# Patient Record
Sex: FEMALE | Race: WHITE | NOT HISPANIC OR LATINO | Employment: OTHER | URBAN - METROPOLITAN AREA
[De-identification: names, ages, dates, MRNs, and addresses within clinical notes are randomized per-mention and may not be internally consistent; named-entity substitution may affect disease eponyms.]

---

## 2017-01-12 ENCOUNTER — OFFICE VISIT (OUTPATIENT)
Dept: GASTROENTEROLOGY | Facility: CLINIC | Age: 77
End: 2017-01-12

## 2017-01-12 VITALS
SYSTOLIC BLOOD PRESSURE: 122 MMHG | WEIGHT: 120.4 LBS | BODY MASS INDEX: 23.64 KG/M2 | DIASTOLIC BLOOD PRESSURE: 82 MMHG | HEIGHT: 60 IN

## 2017-01-12 DIAGNOSIS — R10.10 PAIN OF UPPER ABDOMEN: Primary | ICD-10-CM

## 2017-01-12 PROCEDURE — 99213 OFFICE O/P EST LOW 20 MIN: CPT | Performed by: INTERNAL MEDICINE

## 2017-01-12 RX ORDER — OMEPRAZOLE 40 MG/1
CAPSULE, DELAYED RELEASE ORAL
Refills: 11 | COMMUNITY
Start: 2017-01-03 | End: 2017-06-01

## 2017-01-12 RX ORDER — ESTRADIOL 0.1 MG/G
2 CREAM VAGINAL WEEKLY
Refills: 3 | COMMUNITY
Start: 2017-01-05

## 2017-01-12 NOTE — PROGRESS NOTES
Chief Complaint   Patient presents with   • Follow-up     from colonoscopy and egd       History of Present Illness: We discussed the results of the EGD and colonoscopy (and labs) done in 12/16. She doesn' t think that the Omerprazole 40 mg/day helps. She has occaisonal upper abdominal twisting discomfort that has gone on for years and it happens when she bends over and it is better if she doesn't bend over. No nausea or vomting. No fevers, chills. No diarrhea or constipation.  No rectal bleeding or melena. Weight stable.     Past Medical History   Diagnosis Date   • Female bladder prolapse    • Hyperlipemia    • Hypertension        Past Surgical History   Procedure Laterality Date   • No past surgeries     • Endoscopy N/A 12/2/2016     Procedure: ESOPHAGOGASTRODUODENOSCOPY with biopsy;  Surgeon: Anthony De MD;  Location: [facility] ENDOSCOPY;  Service:    • Colonoscopy N/A 12/2/2016     Procedure: COLONOSCOPY TO CECUM/TI;  Surgeon: Anthony De MD;  Location: [facility] ENDOSCOPY;  Service:          Current Outpatient Prescriptions:   •  amLODIPine-benazepril (LOTREL 5-10) 5-10 MG per capsule, Take 1 capsule by mouth daily., Disp: 90 capsule, Rfl: 1  •  calcium carbonate (OS-ANGELA) 600 MG tablet, Take 600 mg by mouth daily., Disp: , Rfl:   •  ESTRACE VAGINAL 0.1 MG/GM vaginal cream, U UTD QHS FOR 2 WEEKS THEN U THREE TIMES WEEKLY QHS, Disp: , Rfl: 3  •  Multiple Vitamin (MULTI VITAMIN DAILY PO), Take  by mouth., Disp: , Rfl:   •  Omega-3 Fatty Acids (FISH OIL) 1000 MG capsule capsule, Take  by mouth daily with breakfast., Disp: , Rfl:   •  omeprazole (priLOSEC) 40 MG capsule, TK 1 C PO QD, Disp: , Rfl: 11    No Known Allergies    Family History   Problem Relation Age of Onset   • Heart disease Mother    • Heart disease Father    • Heart disease Brother    • Cancer Brother      tongue       Social History     Social History   • Marital status:      Spouse name: N/A   • Number of children: N/A   • Years of education:  N/A     Occupational History   • Not on file.     Social History Main Topics   • Smoking status: Never Smoker   • Smokeless tobacco: Never Used   • Alcohol use Yes      Comment: rarely   • Drug use: No   • Sexual activity: Defer     Other Topics Concern   • Not on file     Social History Narrative       Review of Systems   Gastrointestinal: Positive for abdominal pain.   All other systems reviewed and are negative.      Vitals:    01/12/17 1013   BP: 122/82       Physical Exam   Constitutional: She is oriented to person, place, and time. She appears well-developed and well-nourished. No distress.   HENT:   Head: Normocephalic and atraumatic. Hair is normal.   Right Ear: Hearing, tympanic membrane, external ear and ear canal normal. No drainage. No decreased hearing is noted.   Left Ear: Hearing, tympanic membrane, external ear and ear canal normal. No decreased hearing is noted.   Nose: No nasal deformity.   Mouth/Throat: Oropharynx is clear and moist.   Eyes: Conjunctivae, EOM and lids are normal. Pupils are equal, round, and reactive to light. Right eye exhibits no discharge. Left eye exhibits no discharge.   Neck: Normal range of motion. Neck supple. No JVD present. No tracheal deviation present. No thyromegaly present.   Cardiovascular: Normal rate, regular rhythm, normal heart sounds, intact distal pulses and normal pulses.  Exam reveals no gallop and no friction rub.    No murmur heard.  Pulmonary/Chest: Effort normal and breath sounds normal. No respiratory distress. She has no wheezes. She has no rales. She exhibits no tenderness.   Abdominal: Soft. Bowel sounds are normal. She exhibits no distension and no mass. There is no tenderness. There is no rebound and no guarding. No hernia.   Musculoskeletal: Normal range of motion. She exhibits no edema, tenderness or deformity.   Lymphadenopathy:     She has no cervical adenopathy.   Neurological: She is alert and oriented to person, place, and time. She has  normal reflexes. She displays normal reflexes. No cranial nerve deficit. She exhibits normal muscle tone. Coordination normal.   Skin: Skin is warm and dry. No rash noted. She is not diaphoretic. No erythema.   Psychiatric: She has a normal mood and affect. Her behavior is normal. Judgment and thought content normal.   Vitals reviewed.      Madiha was seen today for follow-up.    Diagnoses and all orders for this visit:    Pain of upper abdomen     Assessment:  1) Upper abdominal disscomfort. She is not interested in any further testing.  2) Heme positive stool with an unrevealing workup.    Recommendations:  1) F/U prn.      No Follow-up on file.

## 2017-01-12 NOTE — MR AVS SNAPSHOT
Joy C Sprigler   2017 10:00 AM   Office Visit    Dept Phone:  [phone]   Encounter #:  [id number]    Provider:  Anthony De MD   Department:  [facility]                Your Full Care Plan              Your Updated Medication List          This list is accurate as of: 17 10:53 AM.  Always use your most recent med list.                amLODIPine-benazepril 5-10 MG per capsule   Commonly known as:  LOTREL 5-10   Take 1 capsule by mouth daily.       calcium carbonate 600 MG tablet   Commonly known as:  OS-ANGELA       ESTRACE VAGINAL 0.1 MG/GM vaginal cream   Generic drug:  estradiol       fish oil 1000 MG capsule capsule       MULTI VITAMIN DAILY PO       omeprazole 40 MG capsule   Commonly known as:  priLOSEC               You Were Diagnosed With        Codes Comments    Pain of upper abdomen    -  Primary ICD-10-CM: R10.10  ICD-9-CM: 789.09       Instructions     None    Patient Instructions History      Upcoming Appointments     Visit Type Date Time Department    FOLLOW UP 2017 10:00 AM [facility]      [employer] Signup     [facility] [employer] allows you to send messages to your doctor, view your test results, renew your prescriptions, schedule appointments, and more. To sign up, go to [employer] and click on the Sign Up Now link in the New User? box. Enter your [employer] Activation Code exactly as it appears below along with the last four digits of your Social Security Number and your Date of Birth () to complete the sign-up process. If you do not sign up before the expiration date, you must request a new code.    [employer] Activation Code: [email or web address]  Expires: 2017 10:53 AM    If you have questions, you can email [email or web address] or call [phone] to talk to our [employer] staff. Remember, [employer] is NOT to be used for urgent needs. For medical emergencies, dial 911.               Other Info  "from Your Visit           Allergies     No Known Allergies      Reason for Visit     Follow-up from colonoscopy and egd      Vital Signs     Blood Pressure Height Weight Body Mass Index Smoking Status       122/82 60\" (152.4 cm) 120 lb 6.4 oz (54.6 kg) 23.51 kg/m2 Never Smoker       Problems and Diagnoses Noted     Pain of upper abdomen    -  Primary        "

## 2017-02-17 RX ORDER — AMLODIPINE BESYLATE AND BENAZEPRIL HYDROCHLORIDE 5; 10 MG/1; MG/1
1 CAPSULE ORAL DAILY
Qty: 90 CAPSULE | Refills: 0 | Status: SHIPPED | OUTPATIENT
Start: 2017-02-17 | End: 2017-06-01 | Stop reason: SDUPTHER

## 2017-02-24 DIAGNOSIS — Z12.31 ENCOUNTER FOR SCREENING MAMMOGRAM FOR BREAST CANCER: Primary | ICD-10-CM

## 2017-03-07 DIAGNOSIS — Z12.31 ENCOUNTER FOR SCREENING MAMMOGRAM FOR BREAST CANCER: ICD-10-CM

## 2017-06-01 ENCOUNTER — OFFICE VISIT (OUTPATIENT)
Dept: FAMILY MEDICINE CLINIC | Facility: CLINIC | Age: 77
End: 2017-06-01

## 2017-06-01 VITALS
OXYGEN SATURATION: 97 % | TEMPERATURE: 98.1 F | SYSTOLIC BLOOD PRESSURE: 138 MMHG | HEART RATE: 73 BPM | RESPIRATION RATE: 14 BRPM | DIASTOLIC BLOOD PRESSURE: 80 MMHG | BODY MASS INDEX: 23.8 KG/M2 | HEIGHT: 60 IN | WEIGHT: 121.2 LBS

## 2017-06-01 DIAGNOSIS — E78.5 HYPERLIPIDEMIA, UNSPECIFIED HYPERLIPIDEMIA TYPE: ICD-10-CM

## 2017-06-01 DIAGNOSIS — I10 ESSENTIAL HYPERTENSION: Primary | ICD-10-CM

## 2017-06-01 PROCEDURE — 99213 OFFICE O/P EST LOW 20 MIN: CPT | Performed by: INTERNAL MEDICINE

## 2017-06-01 RX ORDER — AMLODIPINE BESYLATE AND BENAZEPRIL HYDROCHLORIDE 5; 10 MG/1; MG/1
1 CAPSULE ORAL DAILY
Qty: 90 CAPSULE | Refills: 0 | Status: SHIPPED | OUTPATIENT
Start: 2017-06-01 | End: 2017-09-13 | Stop reason: SDUPTHER

## 2017-06-01 NOTE — PROGRESS NOTES
Subjective chief complaint is follow-up for hypertension and medication refill  Joy C Sprigler is a 76 y.o. female.     History of Present Illness   Madiha is here today for follow-up.  She does have hypertension for which she takes Lotrel 5/10.  She has been on this for a number of years and it controls her blood pressure fairly well.  Recently she has had her colonoscopy done.  She also had an upper endoscopy which showed some mild gastritis.  She also saw the urologist who has fitted her with a pessary.  This seems to be controlling some of her urinary symptoms.  She is not having any vaginitis from this.  She does have a history of hyperlipidemia but is on no medications.  She seems to be well-controlled this with diet.  While in [place] she did have some problems with the ears.  She was told she had wax in her ears.  The following portions of the patient's history were reviewed and updated as appropriate: allergies, current medications, past medical history, past social history and problem list.    Review of Systems   Respiratory: Negative for chest tightness and shortness of breath.    Cardiovascular: Negative for chest pain and leg swelling.   Musculoskeletal:        She still gets some chest wall tenderness   Neurological: Negative for dizziness, light-headedness and headaches.       Objective   Physical Exam   Constitutional: She appears well-developed and well-nourished.   Neck: Carotid bruit is not present. No thyromegaly present.   Cardiovascular: Normal rate, regular rhythm, normal heart sounds and intact distal pulses.  Exam reveals no gallop and no friction rub.    No murmur heard.  Pulmonary/Chest: Effort normal and breath sounds normal. No respiratory distress. She has no wheezes. She has no rales.   Abdominal: Soft. Bowel sounds are normal. She exhibits no distension. There is no tenderness. There is no rebound and no guarding.   Musculoskeletal: She exhibits no edema.   Nursing note and vitals  reviewed.      Assessment/Plan   Madiha was seen today for med management.    Diagnoses and all orders for this visit:    Essential hypertension  -     Comprehensive Metabolic Panel  -     CBC & Differential    Hyperlipidemia, unspecified hyperlipidemia type  -     Lipid Panel    Other orders  -     amLODIPine-benazepril (LOTREL 5-10) 5-10 MG per capsule; Take 1 capsule by mouth Daily.      Madiha is here today for follow-up.  Her blood pressure seems to be doing fairly well we did renew her medication.  We are going to check on her cholesterol today.  She is up-to-date on her screening measures.  She does not want a pneumonia vaccine.

## 2017-06-02 LAB
ALBUMIN SERPL-MCNC: 4.6 G/DL (ref 3.5–5.2)
ALBUMIN/GLOB SERPL: 1.6 G/DL
ALP SERPL-CCNC: 65 U/L (ref 39–117)
ALT SERPL-CCNC: 12 U/L (ref 1–33)
AST SERPL-CCNC: 18 U/L (ref 1–32)
BASOPHILS # BLD AUTO: 0.03 10*3/MM3 (ref 0–0.2)
BASOPHILS NFR BLD AUTO: 0.5 % (ref 0–1.5)
BILIRUB SERPL-MCNC: 0.7 MG/DL (ref 0.1–1.2)
BUN SERPL-MCNC: 16 MG/DL (ref 8–23)
BUN/CREAT SERPL: 22.2 (ref 7–25)
CALCIUM SERPL-MCNC: 10 MG/DL (ref 8.6–10.5)
CHLORIDE SERPL-SCNC: 103 MMOL/L (ref 98–107)
CHOLEST SERPL-MCNC: 200 MG/DL (ref 0–200)
CO2 SERPL-SCNC: 28.4 MMOL/L (ref 22–29)
CREAT SERPL-MCNC: 0.72 MG/DL (ref 0.57–1)
EOSINOPHIL # BLD AUTO: 0.1 10*3/MM3 (ref 0–0.7)
EOSINOPHIL NFR BLD AUTO: 1.5 % (ref 0.3–6.2)
ERYTHROCYTE [DISTWIDTH] IN BLOOD BY AUTOMATED COUNT: 14.1 % (ref 11.7–13)
GLOBULIN SER CALC-MCNC: 2.9 GM/DL
GLUCOSE SERPL-MCNC: 96 MG/DL (ref 65–99)
HCT VFR BLD AUTO: 44.3 % (ref 35.6–45.5)
HDLC SERPL-MCNC: 54 MG/DL (ref 40–60)
HGB BLD-MCNC: 14.4 G/DL (ref 11.9–15.5)
IMM GRANULOCYTES # BLD: 0 10*3/MM3 (ref 0–0.03)
IMM GRANULOCYTES NFR BLD: 0 % (ref 0–0.5)
INTERPRETATION: NORMAL
LDLC SERPL CALC-MCNC: 122 MG/DL (ref 0–100)
LYMPHOCYTES # BLD AUTO: 2.02 10*3/MM3 (ref 0.9–4.8)
LYMPHOCYTES NFR BLD AUTO: 30.3 % (ref 19.6–45.3)
MCH RBC QN AUTO: 29.8 PG (ref 26.9–32)
MCHC RBC AUTO-ENTMCNC: 32.5 G/DL (ref 32.4–36.3)
MCV RBC AUTO: 91.7 FL (ref 80.5–98.2)
MONOCYTES # BLD AUTO: 0.54 10*3/MM3 (ref 0.2–1.2)
MONOCYTES NFR BLD AUTO: 8.1 % (ref 5–12)
NEUTROPHILS # BLD AUTO: 3.97 10*3/MM3 (ref 1.9–8.1)
NEUTROPHILS NFR BLD AUTO: 59.6 % (ref 42.7–76)
PLATELET # BLD AUTO: 322 10*3/MM3 (ref 140–500)
POTASSIUM SERPL-SCNC: 4.2 MMOL/L (ref 3.5–5.2)
PROT SERPL-MCNC: 7.5 G/DL (ref 6–8.5)
RBC # BLD AUTO: 4.83 10*6/MM3 (ref 3.9–5.2)
SODIUM SERPL-SCNC: 144 MMOL/L (ref 136–145)
TRIGL SERPL-MCNC: 118 MG/DL (ref 0–150)
VLDLC SERPL CALC-MCNC: 23.6 MG/DL (ref 5–40)
WBC # BLD AUTO: 6.66 10*3/MM3 (ref 4.5–10.7)

## 2017-09-13 RX ORDER — AMLODIPINE BESYLATE AND BENAZEPRIL HYDROCHLORIDE 5; 10 MG/1; MG/1
1 CAPSULE ORAL DAILY
Qty: 90 CAPSULE | Refills: 0 | Status: SHIPPED | OUTPATIENT
Start: 2017-09-13 | End: 2017-12-22 | Stop reason: SDUPTHER

## 2017-09-14 ENCOUNTER — OFFICE VISIT (OUTPATIENT)
Dept: FAMILY MEDICINE CLINIC | Facility: CLINIC | Age: 77
End: 2017-09-14

## 2017-09-14 VITALS
SYSTOLIC BLOOD PRESSURE: 140 MMHG | BODY MASS INDEX: 23.95 KG/M2 | HEART RATE: 77 BPM | TEMPERATURE: 97.8 F | WEIGHT: 122 LBS | RESPIRATION RATE: 15 BRPM | HEIGHT: 60 IN | DIASTOLIC BLOOD PRESSURE: 88 MMHG

## 2017-09-14 DIAGNOSIS — M54.16 LUMBAR RADICULITIS: ICD-10-CM

## 2017-09-14 DIAGNOSIS — B02.30 HERPES ZOSTER WITH OPHTHALMIC COMPLICATION, UNSPECIFIED HERPES ZOSTER EYE DISEASE: Primary | ICD-10-CM

## 2017-09-14 DIAGNOSIS — R11.0 NAUSEA: ICD-10-CM

## 2017-09-14 PROCEDURE — 99213 OFFICE O/P EST LOW 20 MIN: CPT | Performed by: INTERNAL MEDICINE

## 2017-09-14 RX ORDER — PROMETHAZINE HYDROCHLORIDE 12.5 MG/1
12.5 TABLET ORAL EVERY 6 HOURS PRN
Qty: 12 TABLET | Refills: 0 | Status: SHIPPED | OUTPATIENT
Start: 2017-09-14 | End: 2019-07-12

## 2017-09-14 RX ORDER — ERYTHROMYCIN 5 MG/G
OINTMENT OPHTHALMIC
Refills: 0 | COMMUNITY
Start: 2017-09-11 | End: 2019-07-12

## 2017-09-14 RX ORDER — ACYCLOVIR 800 MG/1
TABLET ORAL
Refills: 0 | COMMUNITY
Start: 2017-09-11 | End: 2019-07-12

## 2017-09-14 NOTE — PROGRESS NOTES
Subjective chief complaint is follow-up for herpes  Joy C Sprigler is a 76 y.o. female.     History of Present Illness   Madiha is here today for follow-up.  She developed herpes in the frontal region approximately 5-7 days before seeking treatment at [facility] because it was itching but not painful..  She has been treated with acyclovir 800 mg 5 times daily.  After a few days of medication she has been feeling shaky and nauseated.  Additionally she had severe pain in her right buttock and leg this morning.  There is been no injury.  She does not have a rash in this area.    Past medical history is remarkable for hypertension.  The following portions of the patient's history were reviewed and updated as appropriate: allergies, current medications, past medical history and problem list.    Review of Systems   Constitutional: Negative for chills and fever.   Eyes: Negative for pain and visual disturbance.       Objective   Physical Exam   Musculoskeletal:   There is no pain with range of motion of the right hip.  There is no tenderness to the trochanter or piriformis muscle.  There is no sacroiliac tenderness.   Skin:   There is a scabbed over herpetic lesions on the left upper eyelid and left frontal area.       Assessment/Plan   Madhia was seen today for herpes zoster.    Diagnoses and all orders for this visit:    Herpes zoster with ophthalmic complication, unspecified herpes zoster eye disease    Lumbar radiculitis    Nausea    Other orders  -     diclofenac (VOLTAREN) 50 MG EC tablet; Take 1 tablet by mouth 2 (Two) Times a Day.  -     promethazine (PHENERGAN) 12.5 MG tablet; Take 1 tablet by mouth Every 6 (Six) Hours As Needed for Nausea or Vomiting.      Madiha is here today for herpes zoster.  I believe she is getting some side effects from the acyclovir.  I did advise stopping this.  She did not start within 48 hours of developing the rash and therefore do not think it's going to do much good   preventing a neuralgia.  She currently is not having any neuralgia pain.  She is only itching.  I think that her pain in the right buttock region is going to be a radicular pain.  There is no focal tenderness anywhere to examine her hip moves okay.  I am going to give her some diclofenac to take.  She can take some Phenergan for the nausea.  She is going to keep an appointment that she has with me next week.

## 2017-09-20 ENCOUNTER — OFFICE VISIT (OUTPATIENT)
Dept: FAMILY MEDICINE CLINIC | Facility: CLINIC | Age: 77
End: 2017-09-20

## 2017-09-20 VITALS
HEIGHT: 60 IN | WEIGHT: 123 LBS | BODY MASS INDEX: 24.15 KG/M2 | DIASTOLIC BLOOD PRESSURE: 80 MMHG | SYSTOLIC BLOOD PRESSURE: 140 MMHG | RESPIRATION RATE: 15 BRPM | HEART RATE: 85 BPM | TEMPERATURE: 98.4 F

## 2017-09-20 DIAGNOSIS — M54.41 ACUTE RIGHT-SIDED LOW BACK PAIN WITH RIGHT-SIDED SCIATICA: ICD-10-CM

## 2017-09-20 DIAGNOSIS — B02.9 HERPES ZOSTER WITHOUT COMPLICATION: Primary | ICD-10-CM

## 2017-09-20 PROCEDURE — 99213 OFFICE O/P EST LOW 20 MIN: CPT | Performed by: INTERNAL MEDICINE

## 2017-09-20 NOTE — PROGRESS NOTES
Subjective chief complaint is follow-up for shingles and back pain  Joy C Sprigler is a 76 y.o. female.     History of Present Illness   Madiha is here today for follow-up.  Since her last visit her shingles have nearly resolved.  She is fortunately not having much pain.  She seemed to have had a reaction to the acyclovir.  Stopping this seemed to resolve her nausea.  She is taking some diclofenac for the radicular pain in that is better as well.  She is going to finish this out.  The following portions of the patient's history were reviewed and updated as appropriate: allergies, current medications, past medical history and problem list.    Review of Systems   Constitutional: Negative for chills and fever.   Gastrointestinal: Negative for nausea and vomiting.   Skin:        She does have some scabbed lesions in the fifth cranial nerve distribution which are characteristic of resolving shingles.       Objective   Physical Exam   Skin:   She has scabbed lesions in the fifth cranial nerve distribution on the left-hand side.  This appears to be resolving shingles.   Nursing note and vitals reviewed.      Assessment/Plan   Madiha was seen today for follow-up.    Diagnoses and all orders for this visit:    Herpes zoster without complication    Acute right-sided low back pain with right-sided sciatica      Madiha is here today for follow-up.  She seems to be doing much better.  I suspect the acyclovir caused her nausea.  Her shingles is nearly resolved and she is having no pain.  We did discuss getting a shingles vaccine one year from now.  Her back pain is improving.  It is not fully improved by the time she finishes her diclofenac and we may get her into some physical therapy

## 2017-12-22 RX ORDER — AMLODIPINE BESYLATE AND BENAZEPRIL HYDROCHLORIDE 5; 10 MG/1; MG/1
1 CAPSULE ORAL DAILY
Qty: 90 CAPSULE | Refills: 0 | Status: SHIPPED | OUTPATIENT
Start: 2017-12-22 | End: 2018-04-02 | Stop reason: SDUPTHER

## 2018-02-26 DIAGNOSIS — Z12.31 ENCOUNTER FOR SCREENING MAMMOGRAM FOR MALIGNANT NEOPLASM OF BREAST: Primary | ICD-10-CM

## 2018-03-09 DIAGNOSIS — R92.8 ABNORMAL MAMMOGRAM OF RIGHT BREAST: Primary | ICD-10-CM

## 2018-03-13 DIAGNOSIS — R92.8 ABNORMAL MAMMOGRAM OF RIGHT BREAST: Primary | ICD-10-CM

## 2018-03-15 ENCOUNTER — TELEPHONE (OUTPATIENT)
Dept: FAMILY MEDICINE CLINIC | Facility: CLINIC | Age: 78
End: 2018-03-15

## 2018-04-02 RX ORDER — AMLODIPINE BESYLATE AND BENAZEPRIL HYDROCHLORIDE 5; 10 MG/1; MG/1
1 CAPSULE ORAL DAILY
Qty: 90 CAPSULE | Refills: 0 | Status: SHIPPED | OUTPATIENT
Start: 2018-04-02 | End: 2018-07-02 | Stop reason: SDUPTHER

## 2018-07-02 RX ORDER — AMLODIPINE BESYLATE AND BENAZEPRIL HYDROCHLORIDE 5; 10 MG/1; MG/1
1 CAPSULE ORAL DAILY
Qty: 90 CAPSULE | Refills: 0 | Status: SHIPPED | OUTPATIENT
Start: 2018-07-02 | End: 2018-09-29 | Stop reason: SDUPTHER

## 2018-07-09 ENCOUNTER — OFFICE VISIT (OUTPATIENT)
Dept: FAMILY MEDICINE CLINIC | Facility: CLINIC | Age: 78
End: 2018-07-09

## 2018-07-09 VITALS
OXYGEN SATURATION: 97 % | WEIGHT: 124 LBS | HEIGHT: 60 IN | DIASTOLIC BLOOD PRESSURE: 70 MMHG | BODY MASS INDEX: 24.35 KG/M2 | HEART RATE: 61 BPM | TEMPERATURE: 97.8 F | SYSTOLIC BLOOD PRESSURE: 120 MMHG

## 2018-07-09 DIAGNOSIS — R55 SYNCOPE, UNSPECIFIED SYNCOPE TYPE: ICD-10-CM

## 2018-07-09 DIAGNOSIS — I10 ESSENTIAL HYPERTENSION: Primary | ICD-10-CM

## 2018-07-09 DIAGNOSIS — R10.13 ABDOMINAL PAIN, EPIGASTRIC: ICD-10-CM

## 2018-07-09 PROCEDURE — 99214 OFFICE O/P EST MOD 30 MIN: CPT | Performed by: INTERNAL MEDICINE

## 2018-07-09 NOTE — PROGRESS NOTES
Subjective chief complaint is checkup on blood pressure  Joy C Sprigler is a 77 y.o. female.     History of Present Illness   Madiha is here today for checkup on her blood pressure.  She is on some amlodipine/benazepril.  This is controlled her blood pressure fairly well.  Since her last visit she did have an episode where she passed out.  This occurred approximately one week ago.  She was eating dinner at a restaurant.  She felt as if she was going to pass out and then did.  This was only for a brief period of time.  An EMS unit did comment check on her.  Her EKG was reportedly normal.  She was not transported to the hospital.  Not feel nauseated before the episode of syncope.  There is no associated chest pain or shortness of breath.  Additionally she is complaining of some epigastric discomfort.  This seems to come and go.  It is located in the mid epigastrium but also all across the upper abdomen.  She does have a prior history of compression fractures at T7 and T9.  This may be referred pain from there.  The following portions of the patient's history were reviewed and updated as appropriate: allergies, current medications, past medical history and problem list.    Review of Systems   Respiratory: Negative for chest tightness and shortness of breath.    Cardiovascular: Negative for chest pain, palpitations and leg swelling.   Gastrointestinal: Positive for abdominal pain. Negative for blood in stool.   Genitourinary: Negative for dysuria and hematuria.   Neurological: Positive for syncope. Negative for dizziness, light-headedness and headache.       Objective   Physical Exam   Constitutional: She appears well-developed and well-nourished.   Neck: Carotid bruit is not present. No thyromegaly present.   Cardiovascular: Normal rate, regular rhythm, normal heart sounds and intact distal pulses.  Exam reveals no friction rub.    No murmur heard.  Pulmonary/Chest: Effort normal and breath sounds normal. No respiratory  distress. She has no wheezes.   Abdominal: Soft. Bowel sounds are normal. She exhibits no distension and no mass. There is no tenderness. There is no guarding.   Musculoskeletal: She exhibits no edema.   Nursing note and vitals reviewed.        Assessment/Plan   Madiha was seen today for follow-up and labs only.    Diagnoses and all orders for this visit:    Essential hypertension  -     CBC & Differential  -     Comprehensive Metabolic Panel  -     Lipid Panel    Abdominal pain, epigastric  -     CT abdomen w contrast; Future    Syncope, unspecified syncope type  -     CBC & Differential  -     Comprehensive Metabolic Panel      Madiha is here today for checkup on her hypertension.  Her blood pressure seems to be doing okay.  I suspect she had an episode of vagal syncope in response to the medial that she ate.  I am going to do some blood work.  If it occurs again we may do a cardiac workup.  For her abdominal pain I am going to get a CT of the abdomen.

## 2018-07-10 LAB
ALBUMIN SERPL-MCNC: 4.7 G/DL (ref 3.5–5.2)
ALBUMIN/GLOB SERPL: 2 G/DL
ALP SERPL-CCNC: 70 U/L (ref 39–117)
ALT SERPL-CCNC: 14 U/L (ref 1–33)
AST SERPL-CCNC: 14 U/L (ref 1–32)
BASOPHILS # BLD AUTO: 0.03 10*3/MM3 (ref 0–0.2)
BASOPHILS NFR BLD AUTO: 0.3 % (ref 0–1.5)
BILIRUB SERPL-MCNC: 0.5 MG/DL (ref 0.1–1.2)
BUN SERPL-MCNC: 20 MG/DL (ref 8–23)
BUN/CREAT SERPL: 25.3 (ref 7–25)
CALCIUM SERPL-MCNC: 9.1 MG/DL (ref 8.6–10.5)
CHLORIDE SERPL-SCNC: 102 MMOL/L (ref 98–107)
CHOLEST SERPL-MCNC: 193 MG/DL (ref 0–200)
CO2 SERPL-SCNC: 27.2 MMOL/L (ref 22–29)
CREAT SERPL-MCNC: 0.79 MG/DL (ref 0.57–1)
EOSINOPHIL # BLD AUTO: 0.27 10*3/MM3 (ref 0–0.7)
EOSINOPHIL NFR BLD AUTO: 3.1 % (ref 0.3–6.2)
ERYTHROCYTE [DISTWIDTH] IN BLOOD BY AUTOMATED COUNT: 14.1 % (ref 11.7–13)
GLOBULIN SER CALC-MCNC: 2.3 GM/DL
GLUCOSE SERPL-MCNC: 86 MG/DL (ref 65–99)
HCT VFR BLD AUTO: 44.6 % (ref 35.6–45.5)
HDLC SERPL-MCNC: 57 MG/DL (ref 40–60)
HGB BLD-MCNC: 14 G/DL (ref 11.9–15.5)
IMM GRANULOCYTES # BLD: 0.02 10*3/MM3 (ref 0–0.03)
IMM GRANULOCYTES NFR BLD: 0.2 % (ref 0–0.5)
LDLC SERPL CALC-MCNC: 122 MG/DL (ref 0–100)
LYMPHOCYTES # BLD AUTO: 2.61 10*3/MM3 (ref 0.9–4.8)
LYMPHOCYTES NFR BLD AUTO: 29.9 % (ref 19.6–45.3)
MCH RBC QN AUTO: 29.2 PG (ref 26.9–32)
MCHC RBC AUTO-ENTMCNC: 31.4 G/DL (ref 32.4–36.3)
MCV RBC AUTO: 92.9 FL (ref 80.5–98.2)
MONOCYTES # BLD AUTO: 0.82 10*3/MM3 (ref 0.2–1.2)
MONOCYTES NFR BLD AUTO: 9.4 % (ref 5–12)
NEUTROPHILS # BLD AUTO: 4.97 10*3/MM3 (ref 1.9–8.1)
NEUTROPHILS NFR BLD AUTO: 57.1 % (ref 42.7–76)
PLATELET # BLD AUTO: 339 10*3/MM3 (ref 140–500)
POTASSIUM SERPL-SCNC: 4.3 MMOL/L (ref 3.5–5.2)
PROT SERPL-MCNC: 7 G/DL (ref 6–8.5)
RBC # BLD AUTO: 4.8 10*6/MM3 (ref 3.9–5.2)
SODIUM SERPL-SCNC: 143 MMOL/L (ref 136–145)
TRIGL SERPL-MCNC: 70 MG/DL (ref 0–150)
VLDLC SERPL CALC-MCNC: 14 MG/DL (ref 5–40)
WBC # BLD AUTO: 8.72 10*3/MM3 (ref 4.5–10.7)

## 2018-07-19 ENCOUNTER — HOSPITAL ENCOUNTER (OUTPATIENT)
Dept: CT IMAGING | Facility: HOSPITAL | Age: 78
Discharge: HOME | End: 2018-07-19
Attending: INTERNAL MEDICINE | Admitting: INTERNAL MEDICINE

## 2018-07-19 DIAGNOSIS — R10.13 ABDOMINAL PAIN, EPIGASTRIC: ICD-10-CM

## 2018-07-19 LAB — CREAT BLDA-MCNC: 0.7 MG/DL (ref 0.6–1.3)

## 2018-07-19 PROCEDURE — 25010000002 IOPAMIDOL 61 % SOLUTION: Performed by: INTERNAL MEDICINE

## 2018-07-19 PROCEDURE — 82565 ASSAY OF CREATININE: CPT

## 2018-07-19 PROCEDURE — 74160 CT ABDOMEN W/CONTRAST: CPT

## 2018-07-19 RX ADMIN — IOPAMIDOL 85 ML: 612 INJECTION, SOLUTION INTRAVENOUS at 10:42

## 2018-09-29 RX ORDER — AMLODIPINE BESYLATE AND BENAZEPRIL HYDROCHLORIDE 5; 10 MG/1; MG/1
1 CAPSULE ORAL DAILY
Qty: 90 CAPSULE | Refills: 1 | Status: SHIPPED | OUTPATIENT
Start: 2018-09-29 | End: 2019-06-03 | Stop reason: SDUPTHER

## 2019-04-26 DIAGNOSIS — Z12.31 ENCOUNTER FOR SCREENING MAMMOGRAM FOR MALIGNANT NEOPLASM OF BREAST: Primary | ICD-10-CM

## 2019-05-01 ENCOUNTER — TRANSCRIBE ORDERS (OUTPATIENT)
Dept: ADMINISTRATIVE | Facility: HOSPITAL | Age: 79
End: 2019-05-01

## 2019-05-01 DIAGNOSIS — Z12.31 VISIT FOR SCREENING MAMMOGRAM: Primary | ICD-10-CM

## 2019-05-08 ENCOUNTER — HOSPITAL ENCOUNTER (OUTPATIENT)
Dept: MAMMOGRAPHY | Facility: HOSPITAL | Age: 79
Discharge: HOME | End: 2019-05-08
Admitting: INTERNAL MEDICINE

## 2019-05-08 DIAGNOSIS — Z12.31 VISIT FOR SCREENING MAMMOGRAM: ICD-10-CM

## 2019-05-08 PROCEDURE — 77063 BREAST TOMOSYNTHESIS BI: CPT

## 2019-05-08 PROCEDURE — 77067 SCR MAMMO BI INCL CAD: CPT

## 2019-06-03 RX ORDER — AMLODIPINE BESYLATE AND BENAZEPRIL HYDROCHLORIDE 5; 10 MG/1; MG/1
1 CAPSULE ORAL DAILY
Qty: 90 CAPSULE | Refills: 1 | Status: SHIPPED | OUTPATIENT
Start: 2019-06-03 | End: 2019-12-04 | Stop reason: SDUPTHER

## 2019-06-10 ENCOUNTER — OFFICE VISIT (OUTPATIENT)
Dept: FAMILY MEDICINE CLINIC | Facility: CLINIC | Age: 79
End: 2019-06-10

## 2019-06-10 VITALS
BODY MASS INDEX: 24.35 KG/M2 | DIASTOLIC BLOOD PRESSURE: 80 MMHG | OXYGEN SATURATION: 98 % | HEART RATE: 74 BPM | HEIGHT: 60 IN | WEIGHT: 124 LBS | SYSTOLIC BLOOD PRESSURE: 110 MMHG | TEMPERATURE: 97.8 F

## 2019-06-10 DIAGNOSIS — E78.5 HYPERLIPIDEMIA, UNSPECIFIED HYPERLIPIDEMIA TYPE: ICD-10-CM

## 2019-06-10 DIAGNOSIS — R10.11 RIGHT UPPER QUADRANT PAIN: ICD-10-CM

## 2019-06-10 DIAGNOSIS — L73.9 FOLLICULITIS: ICD-10-CM

## 2019-06-10 DIAGNOSIS — I10 ESSENTIAL HYPERTENSION: Primary | ICD-10-CM

## 2019-06-10 PROCEDURE — 99214 OFFICE O/P EST MOD 30 MIN: CPT | Performed by: INTERNAL MEDICINE

## 2019-06-10 RX ORDER — CLINDAMYCIN PHOSPHATE 10 MG/G
1 AEROSOL, FOAM TOPICAL DAILY PRN
Qty: 50 G | Refills: 5 | Status: ON HOLD | OUTPATIENT
Start: 2019-06-10 | End: 2020-11-28

## 2019-06-10 NOTE — PROGRESS NOTES
Subjective Chief complaint is follow-up for blood pressure but also laboratories for cholesterol  Joy C Sprigler is a 78 y.o. female.     History of Present Illness   Madiha is here today for follow-up.  She does have hypertension.  She is on amlodipine/benazepril.  This seems to control her blood pressure fairly well.  She has some mild hyperlipidemia.  Her last LDL cholesterol was 122.  Currently we are not treating her with any statin medication.  She has had 2 episodes of right upper quadrant pain.  Both of these occurred while eating at a restaurant.  The pain then radiates to her right shoulder.  She has had a normal ultrasound of the gallbladder 4 years ago and a normal CAT scan of the abdomen less than a year ago.  She is also complaining of some urinary incontinence.  She does use a pessary.  She has to wear a pad for the incontinence.  This is creating some follicular pimple-like lesions in the vaginal area.  The following portions of the patient's history were reviewed and updated as appropriate: allergies, current medications, past family history, past medical history, past social history, past surgical history and problem list.    Review of Systems   Respiratory: Negative for chest tightness and shortness of breath.    Cardiovascular: Negative for chest pain.   Gastrointestinal: Positive for abdominal pain. Negative for nausea and vomiting.       Objective   Physical Exam   Constitutional: She appears well-developed and well-nourished.   Cardiovascular: Normal rate, regular rhythm and normal heart sounds.   Pulmonary/Chest: Effort normal and breath sounds normal.   Abdominal: Soft. Bowel sounds are normal. She exhibits no distension. There is no tenderness. There is no guarding.   Musculoskeletal: She exhibits no edema.   Nursing note and vitals reviewed.        Assessment/Plan   Madiha was seen today for hypertension.    Diagnoses and all orders for this visit:    Essential hypertension    Hyperlipidemia,  unspecified hyperlipidemia type  -     Lipid Panel    Right upper quadrant pain  -     Comprehensive Metabolic Panel  -     Lipid Panel    Folliculitis    Other orders  -     Clindamycin Phosphate 1 % foam; Apply 1 application topically to the appropriate area as directed Daily As Needed (folliculitis).    Madiha is here today for follow-up.  We are going to check on status of her cholesterol.  She is having what sounds like some biliary colic.  Her ultrasound of the gallbladder and CT scan of the abdomen showed no stones.  These episodes are occurring very infrequently and she is not sure she would want her gallbladder taken out.  For now we are simply going to observe this.  Going to give her some Cleocin phone to see if this will help some with her intermittent folliculitis in the perineal region.

## 2019-06-11 LAB
ALBUMIN SERPL-MCNC: 4.2 G/DL (ref 3.5–5.2)
ALBUMIN/GLOB SERPL: 1.6 G/DL
ALP SERPL-CCNC: 77 U/L (ref 39–117)
ALT SERPL-CCNC: 9 U/L (ref 1–33)
AST SERPL-CCNC: 13 U/L (ref 1–32)
BILIRUB SERPL-MCNC: 0.5 MG/DL (ref 0.2–1.2)
BUN SERPL-MCNC: 15 MG/DL (ref 8–23)
BUN/CREAT SERPL: 23.1 (ref 7–25)
CALCIUM SERPL-MCNC: 9.6 MG/DL (ref 8.6–10.5)
CHLORIDE SERPL-SCNC: 104 MMOL/L (ref 98–107)
CHOLEST SERPL-MCNC: 158 MG/DL (ref 0–200)
CO2 SERPL-SCNC: 27.9 MMOL/L (ref 22–29)
CREAT SERPL-MCNC: 0.65 MG/DL (ref 0.57–1)
GLOBULIN SER CALC-MCNC: 2.7 GM/DL
GLUCOSE SERPL-MCNC: 88 MG/DL (ref 65–99)
HDLC SERPL-MCNC: 48 MG/DL (ref 40–60)
LDLC SERPL CALC-MCNC: 90 MG/DL (ref 0–100)
POTASSIUM SERPL-SCNC: 4.5 MMOL/L (ref 3.5–5.2)
PROT SERPL-MCNC: 6.9 G/DL (ref 6–8.5)
SODIUM SERPL-SCNC: 143 MMOL/L (ref 136–145)
TRIGL SERPL-MCNC: 99 MG/DL (ref 0–150)
VLDLC SERPL CALC-MCNC: 19.8 MG/DL

## 2019-07-05 ENCOUNTER — TELEPHONE (OUTPATIENT)
Dept: FAMILY MEDICINE CLINIC | Facility: CLINIC | Age: 79
End: 2019-07-05

## 2019-07-05 NOTE — TELEPHONE ENCOUNTER
She scheduled appt due to sore on her vaginal area for next Thursday.    She's asking if anyone cancels before then to please call her so she can come.

## 2019-07-08 ENCOUNTER — TELEPHONE (OUTPATIENT)
Dept: FAMILY MEDICINE CLINIC | Facility: CLINIC | Age: 79
End: 2019-07-08

## 2019-07-08 DIAGNOSIS — N89.8 VAGINAL CYST: Primary | ICD-10-CM

## 2019-07-08 RX ORDER — CEFADROXIL 500 MG/1
500 CAPSULE ORAL 2 TIMES DAILY
Qty: 14 CAPSULE | Refills: 0 | Status: SHIPPED | OUTPATIENT
Start: 2019-07-08 | End: 2020-01-24

## 2019-07-08 NOTE — TELEPHONE ENCOUNTER
----- Message from Adriane Jimenez sent at 7/8/2019 10:15 AM EDT -----  Sore spot on vaginal area. In a lot of pain. Your next opening isnt til Thursday, she does not want to see Dr bustillos. She is wondering you want to refer her to a dermatologist right away without seeing her. She would love to see derm a week.     Patient [phone]  Patient is describing what sounds like a Bartholin cyst.  I am going to refer her to a gynecologist.  I am going to go ahead and start her on some oral antibiotic.

## 2019-07-09 ENCOUNTER — TELEPHONE (OUTPATIENT)
Dept: OBSTETRICS AND GYNECOLOGY | Age: 79
End: 2019-07-09

## 2019-07-09 NOTE — TELEPHONE ENCOUNTER
Referral received also pt called today to schedule. New gyn pt needing to est care for vaiganal cysts and pt describes a breakout of pimples with pus filled liquid and has lasted for the last month. Pt tries hot compress that helps the sores come and go yet are painful with movement and to the touch. Pt is requesting to be seen asap as in with the next week or so not at next available     Referred by Alfred Joyner    Referred for Vaginal Cysts    Requesting Dr Bonilla     Sending to Lulu for Dr Bonilla to review.

## 2019-07-12 ENCOUNTER — OFFICE VISIT (OUTPATIENT)
Dept: OBSTETRICS AND GYNECOLOGY | Age: 79
End: 2019-07-12

## 2019-07-12 VITALS
SYSTOLIC BLOOD PRESSURE: 140 MMHG | DIASTOLIC BLOOD PRESSURE: 80 MMHG | BODY MASS INDEX: 24.35 KG/M2 | HEIGHT: 60 IN | WEIGHT: 124 LBS

## 2019-07-12 DIAGNOSIS — N39.3 STRESS INCONTINENCE IN FEMALE: ICD-10-CM

## 2019-07-12 DIAGNOSIS — N75.0 BARTHOLIN'S CYST: Primary | ICD-10-CM

## 2019-07-12 DIAGNOSIS — N89.8 VAGINAL IRRITATION: ICD-10-CM

## 2019-07-12 DIAGNOSIS — N95.2 ATROPHIC VAGINITIS: ICD-10-CM

## 2019-07-12 PROCEDURE — 99203 OFFICE O/P NEW LOW 30 MIN: CPT | Performed by: OBSTETRICS & GYNECOLOGY

## 2019-07-12 NOTE — PROGRESS NOTES
Subjective     Chief Complaint   Patient presents with   • Establish Care     New Patient - c/o vaginal cysts.        History of Present Illness  Joy C Sprigler is a 78 y.o. female is being seen today for evaluation of what patient describes as vaginal cyst.  [facility]ence, she utilizes what sounds like a #4 ring pessary.  She uses Estrace vaginal cream.  She recently was given some antibiotics for presumed folliculitis.  Fortunately, her symptoms have improved significantly since she start the medicine.  She does note that she has more discharge than in the past since she has used a pessary.  She has no dysuria no change in her bowel movements and has not seen any bleeding.  She also uses a pad perineally very regularly  Chief Complaint   Patient presents with   • Establish Care     New Patient - c/o vaginal cysts.    .        The following portions of the patient's history were reviewed and updated as appropriate: allergies, current medications, past family history, past medical history, past social history, past surgical history and problem list.    PAST MEDICAL HISTORY  Past Medical History:   Diagnosis Date   • Female bladder prolapse    • Hyperlipemia    • Hypertension      OB History    Para Term  AB Living   2 2 2         SAB TAB Ectopic Molar Multiple Live Births                    # Outcome Date GA Lbr Walker/2nd Weight Sex Delivery Anes PTL Lv   2 Term            1 Term                 Past Surgical History:   Procedure Laterality Date   • COLONOSCOPY N/A 2016    Procedure: COLONOSCOPY TO CECUM/TI;  Surgeon: Anthony De MD;  Location: [facility] ENDOSCOPY;  Service:    • ENDOSCOPY N/A 2016    Procedure: ESOPHAGOGASTRODUODENOSCOPY with biopsy;  Surgeon: Anthony De MD;  Location: [facility] ENDOSCOPY;  Service:    • NO PAST SURGERIES       Family History   Problem Relation Age of Onset   • Heart disease Mother    • Heart disease Father    •  Heart disease Brother    • Cancer Brother         tongue     Social History     Tobacco Use   Smoking Status Never Smoker   Smokeless Tobacco Never Used       Current Outpatient Medications:   •  amLODIPine-benazepril (LOTREL 5-10) 5-10 MG per capsule, Take 1 capsule by mouth Daily., Disp: 90 capsule, Rfl: 1  •  cefadroxil (DURICEF) 500 MG capsule, Take 1 capsule by mouth 2 (Two) Times a Day., Disp: 14 capsule, Rfl: 0  •  Clindamycin Phosphate 1 % foam, Apply 1 application topically to the appropriate area as directed Daily As Needed (folliculitis)., Disp: 50 g, Rfl: 5  •  ESTRACE VAGINAL 0.1 MG/GM vaginal cream, U UTD QHS FOR 2 WEEKS THEN U THREE TIMES WEEKLY QHS, Disp: , Rfl: 3  •  Multiple Vitamin (MULTI VITAMIN DAILY PO), Take  by mouth., Disp: , Rfl:     There is no immunization history on file for this patient.    Review of Systems       Except as outlined in history of physical illness, patient denies any changes in her GYN, , GI systems. All other systems reviewed are negative.    Objective   Physical Exam   Alert and oriented, respirations unlabored, heart regular rate and rhythm   Pelvic external genitalia shows what appears to be a resolving right Bartholin's gland inflammation.  It is now small less than 3 mm and nontender but there is some erythema that is mild.  Additionally patient has a couple small epidermal inclusion cyst that do not appear to be infected there is obvious atrophic changes throughout the perineum.  However, vaginally the mucosa does not appear atrophic.  Her pessary is in place.  There appears to be some discharge that is probably physiologic secondary to the pessary however we are sending a culture to test this discharge.  Cervix uterus adnexa not enlarged nontender without masses, rectal exams normal      Assessment/Plan   Madiha was seen today for establish care.    Diagnoses and all orders for this visit:    Bartholin's cyst  Resolving as outlined above, would recommend  continuing antibiotic that was previously prescribed, talked about bathing, hygiene and other ways to manage the chronic moisture issues that she has with her pessary and pad.  Stress incontinence in female  Will [facility] for her [facility]ary maintenance  Atrophic vaginitis  Estrace vaginal cream utilized at least twice a week and I have strongly suggested that she apply some to the external genitalia especially around the initial introital opening    Patient was very relieved and happy to know that the treatment that was initiated is correct and she seems to be improving                 This encounter/visit was 40 minutes.  The amount of time spent counseling was 10.  Some of the items discussed included atrophic changes, folliculitis, Bartholin cyst and pessary maintenance        EMR Dragon/ Transcription disclaimer:  Much of the encounter note is an electronic transcription/translation of spoken language to printed text. The electronic translation of spoken language may permit erroneous, or at times, nonessential words or phrases to be inadvertently transcribes; Although i have reviewed the note for such errors, some may still exist.

## 2019-07-18 LAB
A VAGINAE DNA VAG QL NAA+PROBE: NORMAL SCORE
BVAB2 DNA VAG QL NAA+PROBE: NORMAL SCORE
C ALBICANS DNA VAG QL NAA+PROBE: NEGATIVE
C GLABRATA DNA VAG QL NAA+PROBE: NEGATIVE
MEGA1 DNA VAG QL NAA+PROBE: NORMAL SCORE
T VAGINALIS RRNA SPEC QL NAA+PROBE: NEGATIVE

## 2019-07-31 ENCOUNTER — TELEPHONE (OUTPATIENT)
Dept: FAMILY MEDICINE CLINIC | Facility: CLINIC | Age: 79
End: 2019-07-31

## 2019-07-31 RX ORDER — SULFAMETHOXAZOLE AND TRIMETHOPRIM 800; 160 MG/1; MG/1
1 TABLET ORAL 2 TIMES DAILY
Qty: 10 TABLET | Refills: 0 | Status: SHIPPED | OUTPATIENT
Start: 2019-07-31 | End: 2020-01-24

## 2019-07-31 NOTE — TELEPHONE ENCOUNTER
----- Message from Padmini Rivera sent at 7/31/2019 10:26 AM EDT -----  PT WANTS AN ANTIBIOTIC FOR A UTI.  SHE HAS PRESSURE, FREQUENCY SHE KNOWS IT IS A UTI.  PHARM#[phone] NEEMA  PT'S#[phone]    A message was left that I would send an antibiotic to her pharmacy

## 2019-12-04 RX ORDER — AMLODIPINE BESYLATE AND BENAZEPRIL HYDROCHLORIDE 5; 10 MG/1; MG/1
1 CAPSULE ORAL DAILY
Qty: 90 CAPSULE | Refills: 0 | Status: SHIPPED | OUTPATIENT
Start: 2019-12-04 | End: 2020-03-06

## 2019-12-09 RX ORDER — AMLODIPINE BESYLATE AND BENAZEPRIL HYDROCHLORIDE 5; 10 MG/1; MG/1
1 CAPSULE ORAL DAILY
Qty: 90 CAPSULE | Refills: 0 | OUTPATIENT
Start: 2019-12-09

## 2020-01-24 ENCOUNTER — OFFICE VISIT (OUTPATIENT)
Dept: FAMILY MEDICINE CLINIC | Facility: CLINIC | Age: 80
End: 2020-01-24

## 2020-01-24 ENCOUNTER — HOSPITAL ENCOUNTER (OUTPATIENT)
Dept: GENERAL RADIOLOGY | Facility: HOSPITAL | Age: 80
Discharge: HOME | End: 2020-01-24
Admitting: INTERNAL MEDICINE

## 2020-01-24 VITALS
SYSTOLIC BLOOD PRESSURE: 130 MMHG | TEMPERATURE: 94.9 F | DIASTOLIC BLOOD PRESSURE: 78 MMHG | OXYGEN SATURATION: 98 % | BODY MASS INDEX: 24.35 KG/M2 | HEART RATE: 79 BPM | HEIGHT: 60 IN | WEIGHT: 124 LBS

## 2020-01-24 DIAGNOSIS — M79.642 BILATERAL HAND PAIN: Primary | ICD-10-CM

## 2020-01-24 DIAGNOSIS — R20.0 NUMBNESS AND TINGLING IN BOTH HANDS: ICD-10-CM

## 2020-01-24 DIAGNOSIS — R20.2 NUMBNESS AND TINGLING IN BOTH HANDS: ICD-10-CM

## 2020-01-24 DIAGNOSIS — M79.641 BILATERAL HAND PAIN: ICD-10-CM

## 2020-01-24 DIAGNOSIS — M79.642 BILATERAL HAND PAIN: ICD-10-CM

## 2020-01-24 DIAGNOSIS — M79.641 BILATERAL HAND PAIN: Primary | ICD-10-CM

## 2020-01-24 PROBLEM — B02.9 SHINGLES: Status: ACTIVE | Noted: 2017-09-11

## 2020-01-24 PROCEDURE — 73130 X-RAY EXAM OF HAND: CPT

## 2020-01-24 PROCEDURE — 99214 OFFICE O/P EST MOD 30 MIN: CPT | Performed by: INTERNAL MEDICINE

## 2020-01-24 RX ORDER — PREDNISONE 10 MG/1
TABLET ORAL
Qty: 30 TABLET | Refills: 0 | Status: SHIPPED | OUTPATIENT
Start: 2020-01-24 | End: 2020-04-24

## 2020-01-24 NOTE — PROGRESS NOTES
Subjective Complaint is hand pain  Joy C Sprigler is a 79 y.o. female.     History of Present Illness   Madiha is here today for complaints of  Severe pain in both of her hands.  This started approximately 1 week ago.  It started fairly symmetrically at the base of the thumbs.  Now her entire hands ache and throb.  There is some associated numbness or tingly feeling in hands.  She has tried taking some Advil which did not seem to help.  She tried Aleve which did make things a little bit better.  She is still having the tingly sensation but the pain is a little bit better.  She has no prior history of carpal tunnel but did work as a dental hygienist.  Past history is remarkable for some hypertension.  She also has a history of an essential tremor that has been made worse by the pain and inability to sleep.  The following portions of the patient's history were reviewed and updated as appropriate: allergies, current medications, past family history, past medical history, past social history, past surgical history and problem list.    Review of Systems   Respiratory: Negative for chest tightness and shortness of breath.    Cardiovascular: Negative for chest pain.   Musculoskeletal: Positive for arthralgias. Negative for neck pain.   Neurological: Positive for numbness.       Objective   Physical Exam   Constitutional: She appears well-developed and well-nourished.   Cardiovascular: Normal rate, regular rhythm and normal heart sounds.   Pulmonary/Chest: Effort normal and breath sounds normal. She has no wheezes.   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  She does have some tenderness at the base of both thumbs.  She has diminished flexion and extension at both wrists.  There is no warmth or erythema.  She has good range of motion of the elbows.  She has no pain with abduction and external rotation of the shoulders.  Hips and knees have good range of motion.   Nursing note and vitals  reviewed.        Assessment/Plan   Madiha was seen today for pain.    Diagnoses and all orders for this visit:    Bilateral hand pain  -     CBC & Differential  -     Rheumatoid Factor  -     Sedimentation Rate  -     Cyclic Citrul Peptide Antibody, IgG / IgA  -     C-reactive Protein  -     XR hand 3+ vw bilateral; Future    Numbness and tingling in both hands    Other orders  -     predniSONE (DELTASONE) 10 MG tablet; Take 3 daily for 5 days, then 2 daily for 5 days, then 1 daily for 5 days      Madiha is here today for bilateral hand pain.  I suspect that she may have some degree of either inflammatory osteoarthritis versus carpal tunnel syndrome.  I am going to do some lab work and x-rays.  I am going to give her a taper of prednisone.  While on this she should not take the Aleve.

## 2020-01-26 LAB
BASOPHILS # BLD AUTO: 0.09 10*3/MM3 (ref 0–0.2)
BASOPHILS NFR BLD AUTO: 0.6 % (ref 0–1.5)
CCP IGA+IGG SERPL IA-ACNC: 14 UNITS (ref 0–19)
CRP SERPL-MCNC: 3.6 MG/DL (ref 0–0.5)
EOSINOPHIL # BLD AUTO: 0.28 10*3/MM3 (ref 0–0.4)
EOSINOPHIL NFR BLD AUTO: 1.8 % (ref 0.3–6.2)
ERYTHROCYTE [DISTWIDTH] IN BLOOD BY AUTOMATED COUNT: 12.5 % (ref 12.3–15.4)
ERYTHROCYTE [SEDIMENTATION RATE] IN BLOOD BY WESTERGREN METHOD: 31 MM/HR (ref 0–30)
HCT VFR BLD AUTO: 39 % (ref 34–46.6)
HGB BLD-MCNC: 12.9 G/DL (ref 12–15.9)
IMM GRANULOCYTES # BLD AUTO: 0.06 10*3/MM3 (ref 0–0.05)
IMM GRANULOCYTES NFR BLD AUTO: 0.4 % (ref 0–0.5)
LYMPHOCYTES # BLD AUTO: 2.49 10*3/MM3 (ref 0.7–3.1)
LYMPHOCYTES NFR BLD AUTO: 15.6 % (ref 19.6–45.3)
MCH RBC QN AUTO: 28 PG (ref 26.6–33)
MCHC RBC AUTO-ENTMCNC: 33.1 G/DL (ref 31.5–35.7)
MCV RBC AUTO: 84.6 FL (ref 79–97)
MONOCYTES # BLD AUTO: 1.18 10*3/MM3 (ref 0.1–0.9)
MONOCYTES NFR BLD AUTO: 7.4 % (ref 5–12)
NEUTROPHILS # BLD AUTO: 11.85 10*3/MM3 (ref 1.7–7)
NEUTROPHILS NFR BLD AUTO: 74.2 % (ref 42.7–76)
NRBC BLD AUTO-RTO: 0 /100 WBC (ref 0–0.2)
PLATELET # BLD AUTO: 567 10*3/MM3 (ref 140–450)
RBC # BLD AUTO: 4.61 10*6/MM3 (ref 3.77–5.28)
RHEUMATOID FACT SERPL-ACNC: <10 IU/ML (ref 0–13.9)
WBC # BLD AUTO: 15.95 10*3/MM3 (ref 3.4–10.8)

## 2020-02-11 ENCOUNTER — TELEPHONE (OUTPATIENT)
Dept: FAMILY MEDICINE CLINIC | Facility: CLINIC | Age: 80
End: 2020-02-11

## 2020-02-11 DIAGNOSIS — R20.2 NUMBNESS AND TINGLING IN BOTH HANDS: Primary | ICD-10-CM

## 2020-02-11 DIAGNOSIS — R20.0 NUMBNESS AND TINGLING IN BOTH HANDS: Primary | ICD-10-CM

## 2020-02-11 DIAGNOSIS — M79.644 PAIN IN FINGER OF BOTH HANDS: ICD-10-CM

## 2020-02-11 DIAGNOSIS — M79.645 PAIN IN FINGER OF BOTH HANDS: ICD-10-CM

## 2020-02-11 NOTE — TELEPHONE ENCOUNTER
Pt says she is still having pain in thumbs and tingling in fingers.Asking what do you recommend?  Advise neck step would be proceeding with a nerve conduction test to rule out carpal tunnel.  I reviewed her x-rays and the thumbs really did not seem to be terribly bad arthritic wise as compared to the third through fifth digits.

## 2020-03-06 RX ORDER — AMLODIPINE BESYLATE AND BENAZEPRIL HYDROCHLORIDE 5; 10 MG/1; MG/1
1 CAPSULE ORAL DAILY
Qty: 90 CAPSULE | Refills: 1 | Status: SHIPPED | OUTPATIENT
Start: 2020-03-06 | End: 2020-09-21

## 2020-03-13 ENCOUNTER — HOSPITAL ENCOUNTER (OUTPATIENT)
Dept: INFUSION THERAPY | Facility: HOSPITAL | Age: 80
Discharge: HOME | End: 2020-03-13
Admitting: PSYCHIATRY & NEUROLOGY

## 2020-03-13 DIAGNOSIS — R20.2 NUMBNESS AND TINGLING IN BOTH HANDS: ICD-10-CM

## 2020-03-13 DIAGNOSIS — M79.645 PAIN IN FINGER OF BOTH HANDS: ICD-10-CM

## 2020-03-13 DIAGNOSIS — M79.644 PAIN IN FINGER OF BOTH HANDS: ICD-10-CM

## 2020-03-13 DIAGNOSIS — R20.0 NUMBNESS AND TINGLING IN BOTH HANDS: ICD-10-CM

## 2020-03-13 PROCEDURE — 95911 NRV CNDJ TEST 9-10 STUDIES: CPT

## 2020-03-13 PROCEDURE — 95911 NRV CNDJ TEST 9-10 STUDIES: CPT | Performed by: PSYCHIATRY & NEUROLOGY

## 2020-03-13 PROCEDURE — 95886 MUSC TEST DONE W/N TEST COMP: CPT | Performed by: PSYCHIATRY & NEUROLOGY

## 2020-03-13 PROCEDURE — 95886 MUSC TEST DONE W/N TEST COMP: CPT

## 2020-03-13 NOTE — PROCEDURES
EMG and Nerve Conduction Studies    I.      Instrument used: Neuromax 1002  II.     Please see data sheets for tabular summary of NCS and details on methods, temperatures and lab standards.   III.    EMG muscles tested for upper extremity studies include the deltoid, biceps, triceps, pronator teres, extensor digitorum communis, first dorsal interosseous and abductor pollicis brevis.    IV.   EMG muscles tested for lower extremity studies include the vastus lateralis, tibialis anterior, peroneus longus, medial gastrocnemius and extensor digitorum brevis.    V.    Additional muscles tested as needed.  Paraspinal muscles tested as needed.   VI.   Please see data sheets for tabular summary of EMG findings.   VII. The complete report includes the data sheets.      Indication: Bilateral hand pain and numbness  History: 79-year-old woman with recent onset of swelling in the right greater than left hand with severe hand pain and numbness and tingling in the fingers.  Symptoms have been getting better but she has weakness in her hands.      Ht: 152.4 cm  Wt: Not reported  HbA1C: No results found for: HGBA1C  TSH: No results found for: TSH    Technical summary:  Nerve conduction studies were obtained in both arms.  Skin temperatures were at least 32 °C measured on the palms.  Needle examination was obtained on selected muscles in both arms.    Results:  1.  Prolonged left median sensory latency at 4.7 ms with low amplitude of 9 µV.  Absent right median sensory potential.  2.  Normal ulnar sensory studies bilaterally.  3.  Normal right radial sensory study.  4.  Prolonged left median motor latency at 5.5 ms with low amplitude of 3.6 mV from wrist stimulation but somewhat higher from the elbow with a dip prior to the negative potential creating an artificially fast conduction velocity.  This pattern suggested a Ha-Destiney connection and so a crossover test was obtained confirming a median to ulnar crossover in the forearm.   Prolonged right median motor latency at 5.6 ms with low amplitude of 0.133 mV from wrist stimulation.  The amplitude was higher from the elbow and so a Ha-Destiney connection was suspected and a crossover test was obtained confirming this was present.  The conduction velocity was artificially fast.  5.  Normal ulnar motor studies bilaterally with exception of some relatively lower amplitude on the left from proximal sites (this can be seen in a Ha-[place] connection where fewer ulnar fibers are present above and just below the elbow and after the crossover the amplitude would be higher since the main ulnar nerve is subsequently joined by the crossing fibers.)  6.  Needle examination of selected muscles in both arms showed profuse fibrillations and positive sharp waves in both abductor pollicis brevis muscles.  There was only a rare motor unit on the right with very rapid firing rate and reduced interference pattern.  There was an increased number of large motor units on the left with rapid firing rates and reduced interference patterns.  Remaining muscles tested showed normal insertional activities, motor units and recruitment patterns.  Cervical paraspinals at C7/T1 showed no abnormality on either side.    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  There were very few motor units on needle examination of the right abductor pollicis brevis.  There were no needle exam changes in the flexor pollicis longus or pronator teres to suggest proximal median nerve involvement.  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  This is a normal variant.  There was no further evidence of a more diffuse polyneuropathy and no further evidence of a cervical radiculopathy on either side by this study.  Study results were discussed with the patient.    Roberto Tillman M.D.              Dictated utilizing Dragon dictation.

## 2020-03-16 DIAGNOSIS — G56.03 BILATERAL CARPAL TUNNEL SYNDROME: Primary | ICD-10-CM

## 2020-04-24 ENCOUNTER — TELEPHONE (OUTPATIENT)
Dept: FAMILY MEDICINE CLINIC | Facility: CLINIC | Age: 80
End: 2020-04-24

## 2020-04-24 ENCOUNTER — OFFICE VISIT (OUTPATIENT)
Dept: FAMILY MEDICINE CLINIC | Facility: CLINIC | Age: 80
End: 2020-04-24

## 2020-04-24 VITALS
SYSTOLIC BLOOD PRESSURE: 130 MMHG | TEMPERATURE: 96.2 F | HEART RATE: 92 BPM | OXYGEN SATURATION: 98 % | HEIGHT: 60 IN | WEIGHT: 120 LBS | BODY MASS INDEX: 23.56 KG/M2 | DIASTOLIC BLOOD PRESSURE: 80 MMHG

## 2020-04-24 DIAGNOSIS — M65.9 SYNOVITIS OF HAND: ICD-10-CM

## 2020-04-24 DIAGNOSIS — G56.03 BILATERAL CARPAL TUNNEL SYNDROME: Primary | ICD-10-CM

## 2020-04-24 PROCEDURE — 99214 OFFICE O/P EST MOD 30 MIN: CPT | Performed by: INTERNAL MEDICINE

## 2020-04-24 RX ORDER — CEPHALEXIN 250 MG/1
CAPSULE ORAL
COMMUNITY
Start: 2020-04-24 | End: 2020-06-01

## 2020-04-24 RX ORDER — GABAPENTIN 100 MG/1
100 CAPSULE ORAL 3 TIMES DAILY
Qty: 90 CAPSULE | Refills: 2 | Status: SHIPPED | OUTPATIENT
Start: 2020-04-24 | End: 2020-06-01

## 2020-04-24 RX ORDER — PREDNISONE 10 MG/1
TABLET ORAL
Qty: 50 TABLET | Refills: 0 | Status: SHIPPED | OUTPATIENT
Start: 2020-04-24 | End: 2020-05-27 | Stop reason: SDUPTHER

## 2020-04-24 NOTE — PROGRESS NOTES
Subjective Chief complaint is carpal tunnel syndrome  Joy C Sprigler is a 79 y.o. female.     History of Present Illness Madiha is complaining of pain from her carpal tunnel syndrome.  Since her last visit her nerve conduction test showed severe bilateral median neuropathy.  She did see hand surgeon who initially opted for conservative measures but thought she would likely need surgery.  When I initially saw her she did have some swelling in the hands.  We did treat her with some prednisone based on a elevated C-reactive protein and sedimentation rate.  Her rheumatoid factor and CCP antibodies were negative.  The prednisone did seem to help some of the swelling.  The swelling has now returned.  She has swollen wrists and digits.  She is having pain extending from the wrist up into the shoulders.  She was prescribe some meloxicam by the hand surgeon but it is a low dose and is not seeming to help.  She was recently prescribed some Keflex for antibiotic suppression for her urinary tract.  The following portions of the patient's history were reviewed and updated as appropriate: allergies, current medications, past family history, past medical history, past social history, past surgical history and problem list.    Review of Systems   Constitutional: Negative for chills and fever.   Neurological: Positive for tremors and numbness.   Psychiatric/Behavioral: The patient is nervous/anxious.        Objective   Physical Exam   Constitutional: She is oriented to person, place, and time.   Cardiovascular: Normal rate.   Pulmonary/Chest: Effort normal.   Musculoskeletal:   There is synovial swelling and tenderness at the wrist as well as at the MCPs.  She has fairly good range of motion of the elbows and shoulders.   Neurological: She is alert and oriented to person, place, and time.   She is seems to have a generalized tremulousness.   Nursing note and vitals reviewed.        Assessment/Plan   Madiha was seen today for carpel  tunnel.    Diagnoses and all orders for this visit:    Bilateral carpal tunnel syndrome  -     gabapentin (NEURONTIN) 100 MG capsule; Take 1 capsule by mouth 3 (Three) Times a Day.    Synovitis of hand  -     FREDDIE Direct Reflex to 11 Biomarker  -     Uric Acid  -     Sedimentation Rate  -     C-reactive Protein  -     Ambulatory Referral to Rheumatology    Other orders  -     predniSONE (DELTASONE) 10 MG tablet; 4 daily for 5 days, then 3 daily for 5 days then 2 daily for 5 days then 1 daily    Madiha is experiencing increased pain.  It is likely that the pain going up her arm is from the carpal tunnel but her other symptoms seem more rheumatologic.  I am going to treat her with a more prolonged course of prednisone.  I am going to start some gabapentin for the nerve pain.  I have advised her not to take the meloxicam.  I am going to refer her to a rheumatologist.

## 2020-04-24 NOTE — TELEPHONE ENCOUNTER
Patient sister calling on behalf of patient. States that patient had carpel tunnel and is experiencing a lot of pain at the moment. States that she is in tears due to the amount of pain that she is in. She says that due to covid 19 the doctors that Dr. Joyner had referred her to are not open. She wanted to be seen in the office today but there were no options, she declined video and there were no televisits. She is requesting that Dr. Joyner give patient a call back on mobile number to discuss. Please advise.

## 2020-04-27 LAB
ANA SER QL: NEGATIVE
CRP SERPL-MCNC: 7.49 MG/DL (ref 0–0.5)
ERYTHROCYTE [SEDIMENTATION RATE] IN BLOOD BY WESTERGREN METHOD: 54 MM/HR (ref 0–30)
URATE SERPL-MCNC: 4.8 MG/DL (ref 2.4–5.7)

## 2020-05-01 ENCOUNTER — TELEPHONE (OUTPATIENT)
Dept: FAMILY MEDICINE CLINIC | Facility: CLINIC | Age: 80
End: 2020-05-01

## 2020-05-01 NOTE — TELEPHONE ENCOUNTER
PT REQUESTED TO GET SCHEDULED A EKG BEFORE HER HAND SURGERY.    PT STATED THAT THE MEDICINE gabapentin (NEURONTIN) 100 MG capsule, MAKES HER FEEL TIRED.     PT REQUESTED A PHONE CALL ABOUT THESE MATTERS.     PLEASE ADVISE     I advised the patient to back off and begin just taking the gabapentin at night for for 5 days.  Then she can try taking it twice a day and see how she does.  If she tolerates that she can go back up to 3 times daily.  She might need to stay at twice daily or even once daily.

## 2020-05-04 ENCOUNTER — TRANSCRIBE ORDERS (OUTPATIENT)
Dept: ADMINISTRATIVE | Facility: HOSPITAL | Age: 80
End: 2020-05-04

## 2020-05-04 DIAGNOSIS — Z12.39 SCREENING BREAST EXAMINATION: Primary | ICD-10-CM

## 2020-05-06 ENCOUNTER — LAB REQUISITION (OUTPATIENT)
Dept: LAB | Facility: HOSPITAL | Age: 80
End: 2020-05-06

## 2020-05-06 DIAGNOSIS — Z00.00 ENCOUNTER FOR GENERAL ADULT MEDICAL EXAMINATION WITHOUT ABNORMAL FINDINGS: ICD-10-CM

## 2020-05-06 LAB — SARS-COV-2 RNA RESP QL NAA+PROBE: NOT DETECTED

## 2020-05-06 PROCEDURE — 87635 SARS-COV-2 COVID-19 AMP PRB: CPT | Performed by: PLASTIC SURGERY

## 2020-05-27 NOTE — TELEPHONE ENCOUNTER
PT CALLED TO SEE IF SHE CAN GET A REFILL OF THE predniSONE (DELTASONE) 10 MG tablet. SHE SAYS THAT SHE IS STILL HAVING TROUBLE WITH HER SHOULDERS AND HANDS. SHE CAN NOT GET INTO SEE HER RHEUMATOID DR UNTIL 7/1/2020.    NEEMA [address] [place], [place]

## 2020-05-28 ENCOUNTER — TELEPHONE (OUTPATIENT)
Dept: FAMILY MEDICINE CLINIC | Facility: CLINIC | Age: 80
End: 2020-05-28

## 2020-05-28 ENCOUNTER — TELEPHONE (OUTPATIENT)
Dept: OBSTETRICS AND GYNECOLOGY | Age: 80
End: 2020-05-28

## 2020-05-28 RX ORDER — PREDNISONE 10 MG/1
TABLET ORAL
Qty: 50 TABLET | Refills: 0 | Status: SHIPPED | OUTPATIENT
Start: 2020-05-28 | End: 2020-06-01

## 2020-05-28 NOTE — TELEPHONE ENCOUNTER
Pt called stated that Lulu was supposed to be looking for a time to schedule her appt to see Dr. Bonilla.  Pt wants to know if she okay to get her pessary reinserted. Please advise.

## 2020-06-01 ENCOUNTER — OFFICE VISIT (OUTPATIENT)
Dept: OBSTETRICS AND GYNECOLOGY | Age: 80
End: 2020-06-01

## 2020-06-01 VITALS
HEIGHT: 60 IN | SYSTOLIC BLOOD PRESSURE: 128 MMHG | BODY MASS INDEX: 23.95 KG/M2 | DIASTOLIC BLOOD PRESSURE: 70 MMHG | WEIGHT: 122 LBS

## 2020-06-01 DIAGNOSIS — Z46.89 PESSARY MAINTENANCE: Primary | ICD-10-CM

## 2020-06-01 DIAGNOSIS — R35.0 URINARY FREQUENCY: ICD-10-CM

## 2020-06-01 LAB
BILIRUB BLD-MCNC: NEGATIVE MG/DL
CLARITY, POC: CLEAR
COLOR UR: YELLOW
GLUCOSE UR STRIP-MCNC: NEGATIVE MG/DL
KETONES UR QL: ABNORMAL
LEUKOCYTE EST, POC: ABNORMAL
NITRITE UR-MCNC: NEGATIVE MG/ML
PH UR: 5.5 [PH] (ref 5–8)
PROT UR STRIP-MCNC: ABNORMAL MG/DL
RBC # UR STRIP: ABNORMAL /UL
SP GR UR: 1.03 (ref 1–1.03)
UROBILINOGEN UR QL: NORMAL

## 2020-06-01 PROCEDURE — 81002 URINALYSIS NONAUTO W/O SCOPE: CPT | Performed by: PHYSICIAN ASSISTANT

## 2020-06-01 PROCEDURE — 99213 OFFICE O/P EST LOW 20 MIN: CPT | Performed by: PHYSICIAN ASSISTANT

## 2020-06-01 PROCEDURE — A4561 PESSARY RUBBER, ANY TYPE: HCPCS | Performed by: PHYSICIAN ASSISTANT

## 2020-06-01 RX ORDER — CLOTRIMAZOLE AND BETAMETHASONE DIPROPIONATE 10; .64 MG/G; MG/G
CREAM TOPICAL EVERY 12 HOURS SCHEDULED
Qty: 15 G | Refills: 0 | Status: SHIPPED | OUTPATIENT
Start: 2020-06-01

## 2020-06-01 NOTE — PROGRESS NOTES
"Subjective     Chief Complaint   Patient presents with   • Gynecologic Exam     c/o raw feeling on labia also pessary has been out for about a month, her vagina needed to rest it was irritated.       Joy C Sprigler is a 79 y.o.  whose LMP is No LMP recorded. Patient is postmenopausal. presents for pessary insertion    She had her pess[facility] by [facility]  Said she was raw and irritated so they wanted her to leave it out  She has had a pessary for 3 years  She is not having sx's currently of pelvic pressure  Denies any vaginal bleeding currently  H/o UTI but not since she had the pessary placed uti's have decreased in number  Is noting some irritation/itch on the left side of her vulva    She is doing well overall  Has no other c/o    Pt of Dr Bonilla  We are both masked d/t pandemic    No Additional Complaints Reported    The following portions of the patient's history were reviewed and updated as appropriate:vital signs, allergies, current medications, past family history, past medical history, past social history, past surgical history and problem list      Review of Systems   Genitourinary:positive for pelvic prolapse     Objective      /70   Ht 152.4 cm (60\")   Wt 55.3 kg (122 lb)   BMI 23.83 kg/m²     Physical Exam    General:   alert, comfortable and no distress   Heart: Not performed today   Lungs: Not performed today.   Breast: Not performed today   Neck: na   Abdomen: {Not performed today   CVA: Not performed today   Pelvis: External genitalia: normal general appearance and no obvious irritation noted, no lesions, sores or color changes  Vaginal: normal without tenderness, induration or masses  Cervix: normal appearance  Adnexa: normal bimanual exam  Uterus: normal single, nontender  Bladder: wnl   Extremities: Not performed today   Neurologic: negative   Psychiatric: Normal affect, judgement, and mood       Lab Review   Labs: Urinalysis - with micro     Imaging   No data " reviewed    Assessment/Plan     ASSESSMENT  1. Pessary maintenance    2. Urinary frequency        PLAN  1.   Orders Placed This Encounter   Procedures   • Urine Culture - , Urine, Random Void   • POC Urinalysis Dipstick       2. Medications prescribed this encounter:        New Medications Ordered This Visit   Medications   • clotrimazole-betamethasone (Lotrisone) 1-0.05 % cream     Sig: Apply  topically to the appropriate area as directed Every 12 (Twelve) Hours.     Dispense:  15 g     Refill:  0       3. Start topical meds for irritation and c/w current care (estrace 3 times a week and avoidance of soap)    4. Changed pessary to incontinence dish with knob #2 and pt tolerated well. Will order a #2 ring with support and knob as well in case this doesn't work out for the patient    Follow up: 3 month(s)    BLANK Wilcox  6/1/2020            No

## 2020-06-02 ENCOUNTER — TELEPHONE (OUTPATIENT)
Dept: OBSTETRICS AND GYNECOLOGY | Age: 80
End: 2020-06-02

## 2020-06-02 NOTE — TELEPHONE ENCOUNTER
(link pt) Pt was seen yesterday and had a pessary inserted, this morning the pessary has fell out and patient is wanting to know what she needs to do. Pt denies any pain but knows that yesterday the sizing of the pessary was debatable.     [phone]

## 2020-06-02 NOTE — TELEPHONE ENCOUNTER
It's up to the patient. I did order a few different sizes for us to try when they arrive in. Otherwise, she can c/i and we can try the different sizes we have available.

## 2020-06-03 LAB
BACTERIA UR CULT: ABNORMAL
BACTERIA UR CULT: ABNORMAL
OTHER ANTIBIOTIC SUSC ISLT: ABNORMAL

## 2020-06-03 RX ORDER — NITROFURANTOIN 25; 75 MG/1; MG/1
100 CAPSULE ORAL 2 TIMES DAILY
Qty: 14 CAPSULE | Refills: 0 | Status: SHIPPED | OUTPATIENT
Start: 2020-06-03 | End: 2020-06-10

## 2020-06-08 ENCOUNTER — APPOINTMENT (OUTPATIENT)
Dept: MAMMOGRAPHY | Facility: HOSPITAL | Age: 80
End: 2020-06-08

## 2020-06-22 ENCOUNTER — APPOINTMENT (OUTPATIENT)
Dept: MAMMOGRAPHY | Facility: HOSPITAL | Age: 80
End: 2020-06-22

## 2020-07-02 ENCOUNTER — HOSPITAL ENCOUNTER (OUTPATIENT)
Dept: MAMMOGRAPHY | Facility: HOSPITAL | Age: 80
Discharge: HOME | End: 2020-07-02
Admitting: INTERNAL MEDICINE

## 2020-07-02 DIAGNOSIS — Z12.39 SCREENING BREAST EXAMINATION: ICD-10-CM

## 2020-07-02 PROCEDURE — 77067 SCR MAMMO BI INCL CAD: CPT

## 2020-07-02 PROCEDURE — 77063 BREAST TOMOSYNTHESIS BI: CPT

## 2020-07-14 ENCOUNTER — OFFICE VISIT (OUTPATIENT)
Dept: OBSTETRICS AND GYNECOLOGY | Age: 80
End: 2020-07-14

## 2020-07-14 VITALS
BODY MASS INDEX: 24.54 KG/M2 | HEIGHT: 60 IN | SYSTOLIC BLOOD PRESSURE: 124 MMHG | WEIGHT: 125 LBS | DIASTOLIC BLOOD PRESSURE: 84 MMHG

## 2020-07-14 DIAGNOSIS — Z46.89 PESSARY MAINTENANCE: Primary | ICD-10-CM

## 2020-07-14 DIAGNOSIS — N81.10 FEMALE BLADDER PROLAPSE: ICD-10-CM

## 2020-07-14 DIAGNOSIS — N39.3 STRESS INCONTINENCE IN FEMALE: ICD-10-CM

## 2020-07-14 LAB
BILIRUB BLD-MCNC: ABNORMAL MG/DL
CLARITY, POC: CLEAR
COLOR UR: YELLOW
GLUCOSE UR STRIP-MCNC: NEGATIVE MG/DL
KETONES UR QL: ABNORMAL
LEUKOCYTE EST, POC: ABNORMAL
NITRITE UR-MCNC: NEGATIVE MG/ML
PH UR: 7 [PH] (ref 5–8)
PROT UR STRIP-MCNC: ABNORMAL MG/DL
RBC # UR STRIP: NEGATIVE /UL
SP GR UR: 1.02 (ref 1–1.03)
UROBILINOGEN UR QL: NORMAL

## 2020-07-14 PROCEDURE — 81003 URINALYSIS AUTO W/O SCOPE: CPT | Performed by: PHYSICIAN ASSISTANT

## 2020-07-14 PROCEDURE — 99213 OFFICE O/P EST LOW 20 MIN: CPT | Performed by: PHYSICIAN ASSISTANT

## 2020-07-14 NOTE — PROGRESS NOTES
"Subjective     Chief Complaint   Patient presents with   • Pessary Check       Joy C Sprigler is a 79 y.o.  whose LMP is No LMP recorded. Patient is postmenopausal. presents with f/u for pessary maintenance    Pt was seen by me in  for pessary reinsertion  I placed a #2 oval with support in pt but it fell out  Previously had used a round pessary with support but we did not have any on hand to place that day  We ordered some new pessaries and pt is here today to try them, however, she is feeling good without pessary  Wonders about going without it  Feels \"\"  No odor or d/c noted  Denies significant pressure or issues with prolapse    Pt of Dr Bonilla  We are both masked d/t pandemic    No Additional Complaints Reported    The following portions of the patient's history were reviewed and updated as appropriate:vital signs, allergies, current medications, past family history, past medical history, past social history, past surgical history and problem list      Review of Systems   Genitourinary:positive for pessary maintenance     Objective      /84   Ht 152.4 cm (60\")   Wt 56.7 kg (125 lb)   Breastfeeding No   BMI 24.41 kg/m²     Physical Exam    General:   Not performed today., comfortable and no distress   Heart: Not performed today   Lungs: Not performed today.   Breast: Not performed today   Neck: na   Abdomen: {Not performed today   CVA: Not performed today   Pelvis: Not performed today   Extremities: Not performed today   Neurologic: negative   Psychiatric: Normal affect, judgement, and mood       Lab Review   Labs: Urinalysis - with micro     Imaging   No data reviewed    Assessment/Plan     ASSESSMENT  1. Pessary maintenance    2. Female bladder prolapse    3. Stress incontinence in female        PLAN  1.   Orders Placed This Encounter   Procedures   • Urine Culture - , Urine, Random Void   • POC Urinalysis Dipstick, Multipro       2. Pt would like to wait before she gets another " pessary placed. This is reasonable given r/b of pessary.  She will call when/if she desires to have one placed. We were considering a #2 round with knob or #2 1/4. Both of which are available if pt changes her mind    Follow up: BLANK Busby  7/14/2020

## 2020-07-16 LAB
BACTERIA UR CULT: NORMAL
BACTERIA UR CULT: NORMAL

## 2020-09-21 RX ORDER — AMLODIPINE BESYLATE AND BENAZEPRIL HYDROCHLORIDE 5; 10 MG/1; MG/1
1 CAPSULE ORAL DAILY
Qty: 90 CAPSULE | Refills: 1 | Status: ON HOLD | OUTPATIENT
Start: 2020-09-21 | End: 2020-11-29 | Stop reason: SDUPTHER

## 2020-09-24 ENCOUNTER — OFFICE VISIT (OUTPATIENT)
Dept: OBSTETRICS AND GYNECOLOGY | Age: 80
End: 2020-09-24

## 2020-09-24 VITALS
BODY MASS INDEX: 24.35 KG/M2 | DIASTOLIC BLOOD PRESSURE: 70 MMHG | WEIGHT: 124 LBS | SYSTOLIC BLOOD PRESSURE: 120 MMHG | HEIGHT: 60 IN

## 2020-09-24 DIAGNOSIS — R39.89 SENSATION OF PRESSURE IN BLADDER AREA: Primary | ICD-10-CM

## 2020-09-24 DIAGNOSIS — N90.7 VULVAR CYST: ICD-10-CM

## 2020-09-24 LAB
BILIRUB BLD-MCNC: NEGATIVE MG/DL
CLARITY, POC: CLEAR
COLOR UR: YELLOW
GLUCOSE UR STRIP-MCNC: NEGATIVE MG/DL
KETONES UR QL: ABNORMAL
LEUKOCYTE EST, POC: ABNORMAL
NITRITE UR-MCNC: NEGATIVE MG/ML
PH UR: 6 [PH] (ref 5–8)
PROT UR STRIP-MCNC: ABNORMAL MG/DL
RBC # UR STRIP: NEGATIVE /UL
SP GR UR: 1.02 (ref 1–1.03)
UROBILINOGEN UR QL: NORMAL

## 2020-09-24 PROCEDURE — 81002 URINALYSIS NONAUTO W/O SCOPE: CPT | Performed by: PHYSICIAN ASSISTANT

## 2020-09-24 PROCEDURE — 99213 OFFICE O/P EST LOW 20 MIN: CPT | Performed by: PHYSICIAN ASSISTANT

## 2020-09-24 PROCEDURE — A4561 PESSARY RUBBER, ANY TYPE: HCPCS | Performed by: PHYSICIAN ASSISTANT

## 2020-09-24 NOTE — PROGRESS NOTES
"Subjective     Chief Complaint   Patient presents with   • Pessary Check     c/o feels like pessary is coming out       Joy C Sprigler is a 79 y.o.  whose LMP is No LMP recorded. Patient is postmenopausal. presents with pelvic pressure and prolapsed bladder  Last seen by me in July and opted to not have a pessary placed at that time  She is now noting pelvic pressure and bladder \"coming out\"  She has used a pessary in the past, a #4 oval with support, but we haven't been able to insert one that large  I ordered a #2 with a knob and she would like to try that today  She has pressure but denies uti sx's but agreeable to having us check for one    She is doing well otherwise    Pt of Dr Bonilla      No Additional Complaints Reported    The following portions of the patient's history were reviewed and updated as appropriate:vital signs, allergies, current medications, past family history, past medical history, past social history, past surgical history and problem list      Review of Systems   Genitourinary:positive for pelvic prolapse     Objective      /70   Ht 152.4 cm (60\")   Wt 56.2 kg (124 lb)   Breastfeeding No   BMI 24.22 kg/m²     Physical Exam  Genitourinary:            General:   alert, comfortable and no distress   Heart: Not performed today   Lungs: Not performed today.   Breast: Not performed today   Neck: na   Abdomen: {Not performed today   CVA: Not performed today   Pelvis: External genitalia: normal general appearance  Vaginal: atrophic mucosa and cystocele present,   Cervix: absent  2 1 1/8 cube placed as unable to place any of the round or oval pessaries. Pt tolerated procedure well  Of note, she has an inflamed cyst/abscess on vulva (see pic) indurated and tender.no pus noted   Extremities: Not performed today   Neurologic: negative   Psychiatric: Normal affect, judgement, and mood       Lab Review   Labs: Urinalysis - with micro     Imaging   No data reviewed    Assessment/Plan "     ASSESSMENT  1. Sensation of pressure in bladder area    2. Vulvar cyst        PLAN  1.   Orders Placed This Encounter   Procedures   • Urine Culture - , Urine, Random Void   • POC Urinalysis Dipstick       2. In regards to cyst/abscess, she has good success with warm compresses and topical antibiotic. I would recommend she c/w this regimen but call back if sx's worsen and I can send in an antibiotic. I also suggested she use anti bacterial soap in the area to hopefully prevent future outbreaks as pt says she is prone to them. Suspect the pad she wears is a contributing factor but she has a hard time going without one d/t incontinence    3. 2 1 3/8 inch cube used. This may not be helpful for her cystocoele but I was unable to get anything inserted that would stay in place. It is possible that an inflatable donut may be a better    Follow up: 3 month(s)    BLANK Wilcox  9/24/2020

## 2020-09-26 LAB
BACTERIA UR CULT: NO GROWTH
BACTERIA UR CULT: NORMAL

## 2020-10-26 ENCOUNTER — OFFICE VISIT (OUTPATIENT)
Dept: OBSTETRICS AND GYNECOLOGY | Age: 80
End: 2020-10-26

## 2020-10-26 VITALS
HEIGHT: 60 IN | SYSTOLIC BLOOD PRESSURE: 128 MMHG | DIASTOLIC BLOOD PRESSURE: 84 MMHG | WEIGHT: 125 LBS | BODY MASS INDEX: 24.54 KG/M2

## 2020-10-26 DIAGNOSIS — N81.10 FEMALE BLADDER PROLAPSE: ICD-10-CM

## 2020-10-26 DIAGNOSIS — R82.90 ABNORMAL URINE ODOR: Primary | ICD-10-CM

## 2020-10-26 DIAGNOSIS — T83.89XA VAGINAL IRRITATION FROM PESSARY (HCC): ICD-10-CM

## 2020-10-26 DIAGNOSIS — N89.8 VAGINAL IRRITATION FROM PESSARY (HCC): ICD-10-CM

## 2020-10-26 LAB
BILIRUB BLD-MCNC: ABNORMAL MG/DL
CLARITY, POC: ABNORMAL
COLOR UR: YELLOW
GLUCOSE UR STRIP-MCNC: NEGATIVE MG/DL
KETONES UR QL: ABNORMAL
LEUKOCYTE EST, POC: ABNORMAL
NITRITE UR-MCNC: NEGATIVE MG/ML
PH UR: 5 [PH] (ref 5–8)
PROT UR STRIP-MCNC: ABNORMAL MG/DL
RBC # UR STRIP: ABNORMAL /UL
SP GR UR: 1.03 (ref 1–1.03)
UROBILINOGEN UR QL: NORMAL

## 2020-10-26 PROCEDURE — 81003 URINALYSIS AUTO W/O SCOPE: CPT | Performed by: PHYSICIAN ASSISTANT

## 2020-10-26 PROCEDURE — 99213 OFFICE O/P EST LOW 20 MIN: CPT | Performed by: PHYSICIAN ASSISTANT

## 2020-10-26 RX ORDER — NITROFURANTOIN 25; 75 MG/1; MG/1
100 CAPSULE ORAL 2 TIMES DAILY
Qty: 14 CAPSULE | Refills: 0 | Status: SHIPPED | OUTPATIENT
Start: 2020-10-26 | End: 2020-11-02

## 2020-10-26 RX ORDER — METRONIDAZOLE 500 MG/1
500 TABLET ORAL 2 TIMES DAILY
Qty: 14 TABLET | Refills: 0 | Status: SHIPPED | OUTPATIENT
Start: 2020-10-26 | End: 2020-11-02

## 2020-10-26 NOTE — PROGRESS NOTES
"Subjective     Chief Complaint   Patient presents with   • Gynecologic Exam     c/o odor, pressure and lots of urine leakage.       Joy C Sprigler is a 79 y.o.  whose LMP is No LMP recorded. Patient is postmenopausal. presents with vaginal d/c and urinary leakage    Pt is prone to UTI's  Started with sx's 2 wks ago  Has a new pessary in that we placed on the   Started noting some d/c recently as well  Has an odor to it    Denies bowel issues, has regular BM's    Pt of Dr Bonilla    No Additional Complaints Reported    The following portions of the patient's history were reviewed and updated as appropriate:vital signs, allergies, current medications, past family history, past medical history, past social history, past surgical history and problem list      Review of Systems   Genitourinary:positive for vaginal discharge     Objective      /84   Ht 152.4 cm (60\")   Wt 56.7 kg (125 lb)   Breastfeeding No   BMI 24.41 kg/m²     Physical Exam    General:   alert, comfortable and no distress   Heart: Not performed today   Lungs: Not performed today.   Breast: Not performed today   Neck: na   Abdomen: {Not performed today   CVA: Not performed today   Pelvis: External genitalia: normal general appearance  Vaginal: discharge, yellow and odor and pessary was removed with some difficulty. Thick, d/c was noted with a strong odor. No bleeding or erosions noted.  Cervix: normal appearance  Adnexa: normal bimanual exam  Uterus: normal single, nontender   Extremities: Not performed today   Neurologic: negative   Psychiatric: Normal affect, judgement, and mood       Lab Review   Labs: Urinalysis - with micro     Imaging   No data reviewed    Assessment/Plan     ASSESSMENT  1. Abnormal urine odor    2. Vaginal irritation from pessary    3. Female bladder prolapse        PLAN  1.   Orders Placed This Encounter   Procedures   • Urine Culture - Urine, Urine, Random Void   • POC Urinalysis Dipstick, Multipro "       2. Medications prescribed this encounter:        New Medications Ordered This Visit   Medications   • nitrofurantoin, macrocrystal-monohydrate, (Macrobid) 100 MG capsule     Sig: Take 1 capsule by mouth 2 (Two) Times a Day for 7 days.     Dispense:  14 capsule     Refill:  0   • metroNIDAZOLE (Flagyl) 500 MG tablet     Sig: Take 1 tablet by mouth 2 (Two) Times a Day for 7 days.     Dispense:  14 tablet     Refill:  0       3. Pessary was removed and we opted to leave it out given the odor and the difficulty in removing it. We have tried a few different pessaries with no success. I would suggest f/u with Dr Bonilla for his guidance and/or urogyn input as to what would be most suitable for this patient moving forward.    Start meds for vaginal and bladder infection. Plan f/u in 7 days to ensure pt's sx's are resolving.     Follow up: 7 day(s)    BLANK Wilcox  10/26/2020

## 2020-10-29 ENCOUNTER — TELEPHONE (OUTPATIENT)
Dept: OBSTETRICS AND GYNECOLOGY | Age: 80
End: 2020-10-29

## 2020-10-29 LAB
BACTERIA UR CULT: ABNORMAL
BACTERIA UR CULT: ABNORMAL
OTHER ANTIBIOTIC SUSC ISLT: ABNORMAL

## 2020-10-29 NOTE — TELEPHONE ENCOUNTER
(Link Pt)       Pt called stating she started taking macrobid and flagyl and noticed that yesterday and this morning her bowel movements have been green. Pt is wanting to know if it is normal.     Please advise     Pharmacy verified     [phone]

## 2020-11-02 ENCOUNTER — TELEPHONE (OUTPATIENT)
Dept: OBSTETRICS AND GYNECOLOGY | Age: 80
End: 2020-11-02

## 2020-11-02 NOTE — TELEPHONE ENCOUNTER
10/26/2020 LABS, PT STATED THEY RECEIVED VM TO RETURN CALL BUT NOT SURE WHAT FOR, I ASKED IF SHE HAD ANY TESTING DONE RECENTLY AND SHE STATED YES.

## 2020-11-02 NOTE — TELEPHONE ENCOUNTER
Patient was called and notified that they had recently been exposed through a tracer to someone diagnosed as having COVID-19 during her 10/26/2020 office visit.  Patient was advised to quarantine until November 9, 2020 and call the health department with any questions..  Patient reports no symptoms.

## 2020-11-10 ENCOUNTER — OFFICE VISIT (OUTPATIENT)
Dept: OBSTETRICS AND GYNECOLOGY | Age: 80
End: 2020-11-10

## 2020-11-10 DIAGNOSIS — Z13.89 SCREENING FOR BLOOD OR PROTEIN IN URINE: Primary | ICD-10-CM

## 2020-11-10 DIAGNOSIS — R10.2 PELVIC PRESSURE IN FEMALE: ICD-10-CM

## 2020-11-10 LAB
BILIRUB BLD-MCNC: ABNORMAL MG/DL
CLARITY, POC: CLEAR
COLOR UR: YELLOW
GLUCOSE UR STRIP-MCNC: NEGATIVE MG/DL
KETONES UR QL: NEGATIVE
LEUKOCYTE EST, POC: ABNORMAL
NITRITE UR-MCNC: NEGATIVE MG/ML
PH UR: 6 [PH] (ref 5–8)
PROT UR STRIP-MCNC: ABNORMAL MG/DL
RBC # UR STRIP: ABNORMAL /UL
SP GR UR: 1.02 (ref 1–1.03)
UROBILINOGEN UR QL: NORMAL

## 2020-11-10 PROCEDURE — 81003 URINALYSIS AUTO W/O SCOPE: CPT | Performed by: PHYSICIAN ASSISTANT

## 2020-11-10 PROCEDURE — 99213 OFFICE O/P EST LOW 20 MIN: CPT | Performed by: PHYSICIAN ASSISTANT

## 2020-11-10 NOTE — PROGRESS NOTES
Subjective     Chief Complaint   Patient presents with   • Pessary Check     follow up pessary check from 10/26/2020       Joy C Sprigler is a 80 y.o.  whose LMP is No LMP recorded. Patient is postmenopausal. presents for gyn f/u    I removed a cube pessary from pt approx 2 wks ago d/t copious, odorous, d/c  I treated her for a vaginal infection and wanted her to f/u here today to be sure her sx's have improved  She is doing well  Has some pressure when she walks for a while so she decreases how long she is walking    She has no change in bladder function but notes she is prone to UTI's    Has no other c/o  F/u appt scheduled with Dr Bonilla to re homero for pessaries      No Additional Complaints Reported    The following portions of the patient's history were reviewed and updated as appropriate:vital signs, allergies, current medications, past family history, past medical history, past social history, past surgical history and problem list      Review of Systems   Genitourinary:positive for vaginal discharge     Objective      There were no vitals taken for this visit.    Physical Exam    General:   alert, comfortable and no distress   Heart: Not performed today   Lungs: Not performed today.   Breast: Not performed today   Neck: na   Abdomen: {Not performed today   CVA: Not performed today   Pelvis: External genitalia: normal general appearance  Vaginal: normal rugae and cystocele present, no d/c noted today   Extremities: Not performed today   Neurologic: negative   Psychiatric: Normal affect, judgement, and mood       Lab Review   Labs: Urinalysis - with micro     Imaging   No data reviewed    Assessment/Plan     ASSESSMENT  1. Screening for blood or protein in urine    2. Pelvic pressure in female        PLAN  1.   Orders Placed This Encounter   Procedures   • Urine Culture - Urine, Urine, Random Void   • POC Urinalysis Dipstick, Multipro       2. Urine culture sent. Pt completed recent ab for UTI. F/u as  scheduled. Call for any problems in meantime    Follow up: 4 week(s)    BLANK Wilcox  11/10/2020

## 2020-11-12 ENCOUNTER — TELEPHONE (OUTPATIENT)
Dept: OBSTETRICS AND GYNECOLOGY | Age: 80
End: 2020-11-12

## 2020-11-12 LAB
BACTERIA UR CULT: NORMAL
BACTERIA UR CULT: NORMAL

## 2020-11-12 NOTE — TELEPHONE ENCOUNTER
----- Message from BLANK Mendoza sent at 11/12/2020  2:43 PM EST -----  Notify pt that her urine culture is negative! Good news.

## 2020-11-23 ENCOUNTER — TELEPHONE (OUTPATIENT)
Dept: OBSTETRICS AND GYNECOLOGY | Age: 80
End: 2020-11-23

## 2020-11-23 ENCOUNTER — OFFICE VISIT (OUTPATIENT)
Dept: OBSTETRICS AND GYNECOLOGY | Age: 80
End: 2020-11-23

## 2020-11-23 VITALS
WEIGHT: 124 LBS | SYSTOLIC BLOOD PRESSURE: 118 MMHG | BODY MASS INDEX: 24.35 KG/M2 | HEIGHT: 60 IN | DIASTOLIC BLOOD PRESSURE: 72 MMHG

## 2020-11-23 DIAGNOSIS — Z13.89 SCREENING FOR BLOOD OR PROTEIN IN URINE: Primary | ICD-10-CM

## 2020-11-23 DIAGNOSIS — N81.10 FEMALE BLADDER PROLAPSE: ICD-10-CM

## 2020-11-23 DIAGNOSIS — R10.2 PELVIC PRESSURE IN FEMALE: ICD-10-CM

## 2020-11-23 LAB
BILIRUB BLD-MCNC: ABNORMAL MG/DL
CLARITY, POC: ABNORMAL
COLOR UR: YELLOW
GLUCOSE UR STRIP-MCNC: NEGATIVE MG/DL
KETONES UR QL: ABNORMAL
LEUKOCYTE EST, POC: ABNORMAL
NITRITE UR-MCNC: NEGATIVE MG/ML
PH UR: 5.5 [PH] (ref 5–8)
PROT UR STRIP-MCNC: ABNORMAL MG/DL
RBC # UR STRIP: ABNORMAL /UL
SP GR UR: 1.03 (ref 1–1.03)
UROBILINOGEN UR QL: ABNORMAL

## 2020-11-23 PROCEDURE — 81003 URINALYSIS AUTO W/O SCOPE: CPT | Performed by: PHYSICIAN ASSISTANT

## 2020-11-23 PROCEDURE — 99213 OFFICE O/P EST LOW 20 MIN: CPT | Performed by: PHYSICIAN ASSISTANT

## 2020-11-23 RX ORDER — NITROFURANTOIN 25; 75 MG/1; MG/1
100 CAPSULE ORAL 2 TIMES DAILY
Qty: 14 CAPSULE | Refills: 0 | Status: ON HOLD | OUTPATIENT
Start: 2020-11-23 | End: 2020-11-28

## 2020-11-23 NOTE — PROGRESS NOTES
"Subjective     Chief Complaint   Patient presents with   • Gynecologic Exam     c/o possible uti, having bladder pain that moved to back.       Joy C Sprigler is a 80 y.o.  whose LMP is No LMP recorded. Patient is postmenopausal. presents with pelvic pressure and back pain    Started last week with pain and pressure  Pressure starts in pelvic area and radiates to back  No nausea, vomiting or fever  Is prone to bladder infections  Denies dysuria or odor to her urine  No vaginal discharge    Bowels wnl    She is prone to UTI's but is not generally symptomatic    She is awaiting an appt with Dr Bonilla in 2 wks to re eval pessary management since I have exhausted all options from my standpoint      No Additional Complaints Reported    The following portions of the patient's history were reviewed and updated as appropriate:vital signs, allergies, current medications, past family history, past medical history, past social history, past surgical history and problem list      Review of Systems   Genitourinary:positive for pelvic pressure     Objective      /72   Ht 152.4 cm (60\")   Wt 56.2 kg (124 lb)   Breastfeeding No   BMI 24.22 kg/m²     Physical Exam    General:   alert, comfortable and no distress   Heart: Not performed today   Lungs: Not performed today.   Breast: Not performed today   Neck: na   Abdomen: {Not performed today   CVA: Negative on the right, mild tenderness on left reported by pt   Pelvis: External genitalia: normal general appearance  Vaginal: normal without tenderness, induration or masses and cystocele present, moderate, midline  Adnexa: normal bimanual exam  Uterus: normal single, nontender   Extremities: Not performed today   Neurologic: negative   Psychiatric: Normal affect, judgement, and mood       Lab Review   Labs: Urinalysis - with micro     Imaging   No data reviewed    Assessment/Plan     ASSESSMENT  1. Screening for blood or protein in urine    2. Pelvic pressure in female  "   3. Female bladder prolapse        PLAN  1.   Orders Placed This Encounter   Procedures   • POC Urinalysis Dipstick, Multipro       2. Medications prescribed this encounter:        New Medications Ordered This Visit   Medications   • nitrofurantoin, macrocrystal-monohydrate, (Macrobid) 100 MG capsule     Sig: Take 1 capsule by mouth 2 (Two) Times a Day for 7 days.     Dispense:  14 capsule     Refill:  0       3. Start antibiotic based on sx's. I reviewed previous culture and opted to treat her with macrobid as that has been effective and tolerated in the past. C/w good hydration and monitor sx's. Will need to go to ER  If sx's acutely worsen. (nausea/vomiting/fever or worsening sx's)    Follow up: 2 week(s)    BLANK Wilcox  11/23/2020

## 2020-11-23 NOTE — TELEPHONE ENCOUNTER
(Link Pt)     Pt called stating she has pain that is radiating near her bladder and moves to the back. Pt states it has been going on for  almost a week. Pt has tried heating pads and taking ibuprofen.Pt states it does not hurt to urinate but has pressure. Pt states it feels like cramps, no bleeding, no discharge.       Please advise     [phone]

## 2020-11-25 LAB
BACTERIA UR CULT: NORMAL
BACTERIA UR CULT: NORMAL

## 2020-11-27 ENCOUNTER — HOSPITAL ENCOUNTER (OUTPATIENT)
Facility: HOSPITAL | Age: 80
Discharge: HOME | End: 2020-11-29
Attending: EMERGENCY MEDICINE | Admitting: INTERNAL MEDICINE

## 2020-11-27 ENCOUNTER — APPOINTMENT (OUTPATIENT)
Dept: GENERAL RADIOLOGY | Facility: HOSPITAL | Age: 80
End: 2020-11-27

## 2020-11-27 ENCOUNTER — APPOINTMENT (OUTPATIENT)
Dept: CT IMAGING | Facility: HOSPITAL | Age: 80
End: 2020-11-27

## 2020-11-27 DIAGNOSIS — N39.0 ACUTE UTI: ICD-10-CM

## 2020-11-27 DIAGNOSIS — R50.9 FEVER AND CHILLS: ICD-10-CM

## 2020-11-27 DIAGNOSIS — N13.30 HYDROURETERONEPHROSIS: ICD-10-CM

## 2020-11-27 DIAGNOSIS — N20.1 RIGHT URETERAL STONE: Primary | ICD-10-CM

## 2020-11-27 LAB
ALBUMIN SERPL-MCNC: 4 G/DL (ref 3.5–5.2)
ALBUMIN/GLOB SERPL: 1.3 G/DL
ALP SERPL-CCNC: 88 U/L (ref 39–117)
ALT SERPL W P-5'-P-CCNC: 10 U/L (ref 1–33)
ANION GAP SERPL CALCULATED.3IONS-SCNC: 9.7 MMOL/L (ref 5–15)
AST SERPL-CCNC: 13 U/L (ref 1–32)
B PARAPERT DNA SPEC QL NAA+PROBE: NOT DETECTED
B PERT DNA SPEC QL NAA+PROBE: NOT DETECTED
BACTERIA UR QL AUTO: ABNORMAL /HPF
BASOPHILS # BLD AUTO: 0.05 10*3/MM3 (ref 0–0.2)
BASOPHILS NFR BLD AUTO: 0.3 % (ref 0–1.5)
BILIRUB SERPL-MCNC: 0.4 MG/DL (ref 0–1.2)
BILIRUB UR QL STRIP: NEGATIVE
BUN SERPL-MCNC: 14 MG/DL (ref 8–23)
BUN/CREAT SERPL: 20 (ref 7–25)
C PNEUM DNA NPH QL NAA+NON-PROBE: NOT DETECTED
CALCIUM SPEC-SCNC: 9.2 MG/DL (ref 8.6–10.5)
CHLORIDE SERPL-SCNC: 102 MMOL/L (ref 98–107)
CLARITY UR: ABNORMAL
CO2 SERPL-SCNC: 26.3 MMOL/L (ref 22–29)
COLOR UR: YELLOW
CREAT SERPL-MCNC: 0.7 MG/DL (ref 0.57–1)
D-LACTATE SERPL-SCNC: 1.1 MMOL/L (ref 0.5–2)
DEPRECATED RDW RBC AUTO: 44.3 FL (ref 37–54)
EOSINOPHIL # BLD AUTO: 0.12 10*3/MM3 (ref 0–0.4)
EOSINOPHIL NFR BLD AUTO: 0.8 % (ref 0.3–6.2)
ERYTHROCYTE [DISTWIDTH] IN BLOOD BY AUTOMATED COUNT: 13.4 % (ref 12.3–15.4)
FLUAV SUBTYP SPEC NAA+PROBE: NOT DETECTED
FLUBV RNA ISLT QL NAA+PROBE: NOT DETECTED
GFR SERPL CREATININE-BSD FRML MDRD: 80 ML/MIN/1.73
GLOBULIN UR ELPH-MCNC: 3.1 GM/DL
GLUCOSE SERPL-MCNC: 101 MG/DL (ref 65–99)
GLUCOSE UR STRIP-MCNC: NEGATIVE MG/DL
HADV DNA SPEC NAA+PROBE: NOT DETECTED
HCOV 229E RNA SPEC QL NAA+PROBE: NOT DETECTED
HCOV HKU1 RNA SPEC QL NAA+PROBE: NOT DETECTED
HCOV NL63 RNA SPEC QL NAA+PROBE: NOT DETECTED
HCOV OC43 RNA SPEC QL NAA+PROBE: NOT DETECTED
HCT VFR BLD AUTO: 38.9 % (ref 34–46.6)
HGB BLD-MCNC: 12.6 G/DL (ref 12–15.9)
HGB UR QL STRIP.AUTO: ABNORMAL
HMPV RNA NPH QL NAA+NON-PROBE: NOT DETECTED
HPIV1 RNA SPEC QL NAA+PROBE: NOT DETECTED
HPIV2 RNA SPEC QL NAA+PROBE: NOT DETECTED
HPIV3 RNA NPH QL NAA+PROBE: NOT DETECTED
HPIV4 P GENE NPH QL NAA+PROBE: NOT DETECTED
HYALINE CASTS UR QL AUTO: ABNORMAL /LPF
IMM GRANULOCYTES # BLD AUTO: 0.06 10*3/MM3 (ref 0–0.05)
IMM GRANULOCYTES NFR BLD AUTO: 0.4 % (ref 0–0.5)
KETONES UR QL STRIP: ABNORMAL
LEUKOCYTE ESTERASE UR QL STRIP.AUTO: ABNORMAL
LYMPHOCYTES # BLD AUTO: 2.09 10*3/MM3 (ref 0.7–3.1)
LYMPHOCYTES NFR BLD AUTO: 13.4 % (ref 19.6–45.3)
M PNEUMO IGG SER IA-ACNC: NOT DETECTED
MCH RBC QN AUTO: 28.6 PG (ref 26.6–33)
MCHC RBC AUTO-ENTMCNC: 32.4 G/DL (ref 31.5–35.7)
MCV RBC AUTO: 88.4 FL (ref 79–97)
MONOCYTES # BLD AUTO: 1.52 10*3/MM3 (ref 0.1–0.9)
MONOCYTES NFR BLD AUTO: 9.7 % (ref 5–12)
NEUTROPHILS NFR BLD AUTO: 11.75 10*3/MM3 (ref 1.7–7)
NEUTROPHILS NFR BLD AUTO: 75.4 % (ref 42.7–76)
NITRITE UR QL STRIP: NEGATIVE
NRBC BLD AUTO-RTO: 0 /100 WBC (ref 0–0.2)
PH UR STRIP.AUTO: <=5 [PH] (ref 5–8)
PLATELET # BLD AUTO: 371 10*3/MM3 (ref 140–450)
PMV BLD AUTO: 10.5 FL (ref 6–12)
POTASSIUM SERPL-SCNC: 3.6 MMOL/L (ref 3.5–5.2)
PROCALCITONIN SERPL-MCNC: 0.09 NG/ML (ref 0–0.25)
PROT SERPL-MCNC: 7.1 G/DL (ref 6–8.5)
PROT UR QL STRIP: ABNORMAL
RBC # BLD AUTO: 4.4 10*6/MM3 (ref 3.77–5.28)
RBC # UR: ABNORMAL /HPF
REF LAB TEST METHOD: ABNORMAL
RHINOVIRUS RNA SPEC NAA+PROBE: NOT DETECTED
RSV RNA NPH QL NAA+NON-PROBE: NOT DETECTED
SARS-COV-2 RNA NPH QL NAA+NON-PROBE: NOT DETECTED
SODIUM SERPL-SCNC: 138 MMOL/L (ref 136–145)
SP GR UR STRIP: 1.02 (ref 1–1.03)
SQUAMOUS #/AREA URNS HPF: ABNORMAL /HPF
UROBILINOGEN UR QL STRIP: ABNORMAL
WBC # BLD AUTO: 15.59 10*3/MM3 (ref 3.4–10.8)
WBC UR QL AUTO: ABNORMAL /HPF

## 2020-11-27 PROCEDURE — 25010000002 IOPAMIDOL 61 % SOLUTION: Performed by: EMERGENCY MEDICINE

## 2020-11-27 PROCEDURE — 96365 THER/PROPH/DIAG IV INF INIT: CPT

## 2020-11-27 PROCEDURE — 25010000003 CEFTRIAXONE PER 250 MG: Performed by: PHYSICIAN ASSISTANT

## 2020-11-27 PROCEDURE — 87040 BLOOD CULTURE FOR BACTERIA: CPT | Performed by: PHYSICIAN ASSISTANT

## 2020-11-27 PROCEDURE — 25010000002 ONDANSETRON PER 1 MG: Performed by: EMERGENCY MEDICINE

## 2020-11-27 PROCEDURE — 83605 ASSAY OF LACTIC ACID: CPT | Performed by: PHYSICIAN ASSISTANT

## 2020-11-27 PROCEDURE — 0202U NFCT DS 22 TRGT SARS-COV-2: CPT | Performed by: PHYSICIAN ASSISTANT

## 2020-11-27 PROCEDURE — 99284 EMERGENCY DEPT VISIT MOD MDM: CPT

## 2020-11-27 PROCEDURE — 71045 X-RAY EXAM CHEST 1 VIEW: CPT

## 2020-11-27 PROCEDURE — 96375 TX/PRO/DX INJ NEW DRUG ADDON: CPT

## 2020-11-27 PROCEDURE — G0378 HOSPITAL OBSERVATION PER HR: HCPCS

## 2020-11-27 PROCEDURE — 74177 CT ABD & PELVIS W/CONTRAST: CPT

## 2020-11-27 PROCEDURE — 81001 URINALYSIS AUTO W/SCOPE: CPT | Performed by: PHYSICIAN ASSISTANT

## 2020-11-27 PROCEDURE — 84145 PROCALCITONIN (PCT): CPT | Performed by: PHYSICIAN ASSISTANT

## 2020-11-27 PROCEDURE — 85025 COMPLETE CBC W/AUTO DIFF WBC: CPT | Performed by: PHYSICIAN ASSISTANT

## 2020-11-27 PROCEDURE — 80053 COMPREHEN METABOLIC PANEL: CPT | Performed by: PHYSICIAN ASSISTANT

## 2020-11-27 PROCEDURE — 25010000002 MORPHINE PER 10 MG: Performed by: EMERGENCY MEDICINE

## 2020-11-27 PROCEDURE — 87086 URINE CULTURE/COLONY COUNT: CPT | Performed by: EMERGENCY MEDICINE

## 2020-11-27 RX ORDER — MORPHINE SULFATE 2 MG/ML
2 INJECTION, SOLUTION INTRAMUSCULAR; INTRAVENOUS AS NEEDED
Status: COMPLETED | OUTPATIENT
Start: 2020-11-27 | End: 2020-11-27

## 2020-11-27 RX ORDER — CEFTRIAXONE SODIUM 2 G/50ML
2 INJECTION, SOLUTION INTRAVENOUS ONCE
Status: COMPLETED | OUTPATIENT
Start: 2020-11-27 | End: 2020-11-27

## 2020-11-27 RX ORDER — ONDANSETRON 2 MG/ML
4 INJECTION INTRAMUSCULAR; INTRAVENOUS ONCE
Status: COMPLETED | OUTPATIENT
Start: 2020-11-27 | End: 2020-11-27

## 2020-11-27 RX ADMIN — MORPHINE SULFATE 2 MG: 2 INJECTION, SOLUTION INTRAMUSCULAR; INTRAVENOUS at 20:41

## 2020-11-27 RX ADMIN — ONDANSETRON 4 MG: 2 INJECTION INTRAMUSCULAR; INTRAVENOUS at 20:40

## 2020-11-27 RX ADMIN — CEFTRIAXONE SODIUM 2 G: 2 INJECTION, SOLUTION INTRAVENOUS at 22:43

## 2020-11-27 RX ADMIN — SODIUM CHLORIDE, POTASSIUM CHLORIDE, SODIUM LACTATE AND CALCIUM CHLORIDE 500 ML: 600; 310; 30; 20 INJECTION, SOLUTION INTRAVENOUS at 20:38

## 2020-11-27 RX ADMIN — IOPAMIDOL 85 ML: 612 INJECTION, SOLUTION INTRAVENOUS at 21:46

## 2020-11-28 ENCOUNTER — APPOINTMENT (OUTPATIENT)
Dept: GENERAL RADIOLOGY | Facility: HOSPITAL | Age: 80
End: 2020-11-28

## 2020-11-28 ENCOUNTER — ANESTHESIA (OUTPATIENT)
Dept: PERIOP | Facility: HOSPITAL | Age: 80
End: 2020-11-28

## 2020-11-28 ENCOUNTER — ANESTHESIA EVENT (OUTPATIENT)
Dept: PERIOP | Facility: HOSPITAL | Age: 80
End: 2020-11-28

## 2020-11-28 PROBLEM — N39.0 UTI (URINARY TRACT INFECTION): Status: ACTIVE | Noted: 2020-11-28

## 2020-11-28 PROBLEM — N13.30 HYDROURETERONEPHROSIS: Status: ACTIVE | Noted: 2020-11-28

## 2020-11-28 LAB
ANION GAP SERPL CALCULATED.3IONS-SCNC: 8.3 MMOL/L (ref 5–15)
BACTERIA SPEC AEROBE CULT: NO GROWTH
BUN SERPL-MCNC: 10 MG/DL (ref 8–23)
BUN/CREAT SERPL: 16.1 (ref 7–25)
CALCIUM SPEC-SCNC: 8.8 MG/DL (ref 8.6–10.5)
CHLORIDE SERPL-SCNC: 102 MMOL/L (ref 98–107)
CO2 SERPL-SCNC: 27.7 MMOL/L (ref 22–29)
CREAT SERPL-MCNC: 0.62 MG/DL (ref 0.57–1)
DEPRECATED RDW RBC AUTO: 39.9 FL (ref 37–54)
ERYTHROCYTE [DISTWIDTH] IN BLOOD BY AUTOMATED COUNT: 13 % (ref 12.3–15.4)
GFR SERPL CREATININE-BSD FRML MDRD: 92 ML/MIN/1.73
GLUCOSE SERPL-MCNC: 110 MG/DL (ref 65–99)
HCT VFR BLD AUTO: 34.6 % (ref 34–46.6)
HGB BLD-MCNC: 11.3 G/DL (ref 12–15.9)
MCH RBC QN AUTO: 27.8 PG (ref 26.6–33)
MCHC RBC AUTO-ENTMCNC: 32.7 G/DL (ref 31.5–35.7)
MCV RBC AUTO: 85 FL (ref 79–97)
PLATELET # BLD AUTO: 355 10*3/MM3 (ref 140–450)
PMV BLD AUTO: 10.6 FL (ref 6–12)
POTASSIUM SERPL-SCNC: 3.7 MMOL/L (ref 3.5–5.2)
RBC # BLD AUTO: 4.07 10*6/MM3 (ref 3.77–5.28)
SODIUM SERPL-SCNC: 138 MMOL/L (ref 136–145)
WBC # BLD AUTO: 11.92 10*3/MM3 (ref 3.4–10.8)

## 2020-11-28 PROCEDURE — 63710000001 DOCUSATE SODIUM 100 MG CAPSULE: Performed by: NURSE PRACTITIONER

## 2020-11-28 PROCEDURE — 36415 COLL VENOUS BLD VENIPUNCTURE: CPT | Performed by: NURSE PRACTITIONER

## 2020-11-28 PROCEDURE — 96361 HYDRATE IV INFUSION ADD-ON: CPT

## 2020-11-28 PROCEDURE — 25010000002 DEXAMETHASONE PER 1 MG: Performed by: NURSE ANESTHETIST, CERTIFIED REGISTERED

## 2020-11-28 PROCEDURE — 25010000002 FENTANYL CITRATE (PF) 100 MCG/2ML SOLUTION: Performed by: ANESTHESIOLOGY

## 2020-11-28 PROCEDURE — 25010000002 FENTANYL CITRATE (PF) 100 MCG/2ML SOLUTION: Performed by: NURSE ANESTHETIST, CERTIFIED REGISTERED

## 2020-11-28 PROCEDURE — G0378 HOSPITAL OBSERVATION PER HR: HCPCS

## 2020-11-28 PROCEDURE — A9270 NON-COVERED ITEM OR SERVICE: HCPCS | Performed by: NURSE PRACTITIONER

## 2020-11-28 PROCEDURE — 63710000001 OXYCODONE-ACETAMINOPHEN 7.5-325 MG TABLET: Performed by: NURSE ANESTHETIST, CERTIFIED REGISTERED

## 2020-11-28 PROCEDURE — 25010000003 CEFTRIAXONE PER 250 MG: Performed by: UROLOGY

## 2020-11-28 PROCEDURE — 82365 CALCULUS SPECTROSCOPY: CPT | Performed by: UROLOGY

## 2020-11-28 PROCEDURE — 25010000002 ONDANSETRON PER 1 MG: Performed by: NURSE ANESTHETIST, CERTIFIED REGISTERED

## 2020-11-28 PROCEDURE — C2617 STENT, NON-COR, TEM W/O DEL: HCPCS | Performed by: UROLOGY

## 2020-11-28 PROCEDURE — 25010000002 PROPOFOL 10 MG/ML EMULSION: Performed by: NURSE ANESTHETIST, CERTIFIED REGISTERED

## 2020-11-28 PROCEDURE — A9270 NON-COVERED ITEM OR SERVICE: HCPCS | Performed by: NURSE ANESTHETIST, CERTIFIED REGISTERED

## 2020-11-28 PROCEDURE — 80048 BASIC METABOLIC PNL TOTAL CA: CPT | Performed by: NURSE PRACTITIONER

## 2020-11-28 PROCEDURE — C1769 GUIDE WIRE: HCPCS | Performed by: UROLOGY

## 2020-11-28 PROCEDURE — 74420 UROGRAPHY RTRGR +-KUB: CPT

## 2020-11-28 PROCEDURE — C1758 CATHETER, URETERAL: HCPCS | Performed by: UROLOGY

## 2020-11-28 PROCEDURE — 85027 COMPLETE CBC AUTOMATED: CPT | Performed by: NURSE PRACTITIONER

## 2020-11-28 DEVICE — URETERAL STENT
Type: IMPLANTABLE DEVICE | Site: URETER | Status: FUNCTIONAL
Brand: CONTOUR™

## 2020-11-28 RX ORDER — ONDANSETRON 2 MG/ML
4 INJECTION INTRAMUSCULAR; INTRAVENOUS EVERY 6 HOURS PRN
Status: DISCONTINUED | OUTPATIENT
Start: 2020-11-28 | End: 2020-11-29 | Stop reason: HOSPADM

## 2020-11-28 RX ORDER — SODIUM CHLORIDE 0.9 % (FLUSH) 0.9 %
10 SYRINGE (ML) INJECTION AS NEEDED
Status: DISCONTINUED | OUTPATIENT
Start: 2020-11-28 | End: 2020-11-29 | Stop reason: HOSPADM

## 2020-11-28 RX ORDER — DIPHENHYDRAMINE HYDROCHLORIDE 50 MG/ML
12.5 INJECTION INTRAMUSCULAR; INTRAVENOUS
Status: DISCONTINUED | OUTPATIENT
Start: 2020-11-28 | End: 2020-11-28 | Stop reason: HOSPADM

## 2020-11-28 RX ORDER — HYDROCODONE BITARTRATE AND ACETAMINOPHEN 7.5; 325 MG/1; MG/1
1 TABLET ORAL ONCE AS NEEDED
Status: DISCONTINUED | OUTPATIENT
Start: 2020-11-28 | End: 2020-11-28 | Stop reason: HOSPADM

## 2020-11-28 RX ORDER — PROMETHAZINE HYDROCHLORIDE 25 MG/1
25 SUPPOSITORY RECTAL ONCE AS NEEDED
Status: DISCONTINUED | OUTPATIENT
Start: 2020-11-28 | End: 2020-11-28 | Stop reason: HOSPADM

## 2020-11-28 RX ORDER — CEFTRIAXONE SODIUM 2 G/50ML
2 INJECTION, SOLUTION INTRAVENOUS EVERY 24 HOURS
Status: DISCONTINUED | OUTPATIENT
Start: 2020-11-28 | End: 2020-11-29 | Stop reason: HOSPADM

## 2020-11-28 RX ORDER — DIPHENHYDRAMINE HCL 25 MG
25 CAPSULE ORAL
Status: DISCONTINUED | OUTPATIENT
Start: 2020-11-28 | End: 2020-11-28 | Stop reason: HOSPADM

## 2020-11-28 RX ORDER — SODIUM CHLORIDE, SODIUM LACTATE, POTASSIUM CHLORIDE, CALCIUM CHLORIDE 600; 310; 30; 20 MG/100ML; MG/100ML; MG/100ML; MG/100ML
9 INJECTION, SOLUTION INTRAVENOUS CONTINUOUS PRN
Status: DISCONTINUED | OUTPATIENT
Start: 2020-11-28 | End: 2020-11-29 | Stop reason: HOSPADM

## 2020-11-28 RX ORDER — MORPHINE SULFATE 2 MG/ML
2 INJECTION, SOLUTION INTRAMUSCULAR; INTRAVENOUS
Status: DISCONTINUED | OUTPATIENT
Start: 2020-11-28 | End: 2020-11-29 | Stop reason: HOSPADM

## 2020-11-28 RX ORDER — PROPOFOL 10 MG/ML
VIAL (ML) INTRAVENOUS AS NEEDED
Status: DISCONTINUED | OUTPATIENT
Start: 2020-11-28 | End: 2020-11-28 | Stop reason: SURG

## 2020-11-28 RX ORDER — FENTANYL CITRATE 50 UG/ML
50 INJECTION, SOLUTION INTRAMUSCULAR; INTRAVENOUS
Status: DISCONTINUED | OUTPATIENT
Start: 2020-11-28 | End: 2020-11-28 | Stop reason: HOSPADM

## 2020-11-28 RX ORDER — HYDROMORPHONE HYDROCHLORIDE 1 MG/ML
0.5 INJECTION, SOLUTION INTRAMUSCULAR; INTRAVENOUS; SUBCUTANEOUS
Status: DISCONTINUED | OUTPATIENT
Start: 2020-11-28 | End: 2020-11-28 | Stop reason: HOSPADM

## 2020-11-28 RX ORDER — SODIUM CHLORIDE 9 MG/ML
100 INJECTION, SOLUTION INTRAVENOUS CONTINUOUS
Status: DISCONTINUED | OUTPATIENT
Start: 2020-11-28 | End: 2020-11-29

## 2020-11-28 RX ORDER — ACETAMINOPHEN 650 MG/1
650 SUPPOSITORY RECTAL EVERY 4 HOURS PRN
Status: DISCONTINUED | OUTPATIENT
Start: 2020-11-28 | End: 2020-11-29 | Stop reason: HOSPADM

## 2020-11-28 RX ORDER — NITROGLYCERIN 0.4 MG/1
0.4 TABLET SUBLINGUAL
Status: DISCONTINUED | OUTPATIENT
Start: 2020-11-28 | End: 2020-11-29 | Stop reason: HOSPADM

## 2020-11-28 RX ORDER — FAMOTIDINE 10 MG/ML
20 INJECTION, SOLUTION INTRAVENOUS ONCE
Status: COMPLETED | OUTPATIENT
Start: 2020-11-28 | End: 2020-11-28

## 2020-11-28 RX ORDER — FLUMAZENIL 0.1 MG/ML
0.2 INJECTION INTRAVENOUS AS NEEDED
Status: DISCONTINUED | OUTPATIENT
Start: 2020-11-28 | End: 2020-11-28 | Stop reason: HOSPADM

## 2020-11-28 RX ORDER — LIDOCAINE HYDROCHLORIDE 20 MG/ML
INJECTION, SOLUTION INFILTRATION; PERINEURAL AS NEEDED
Status: DISCONTINUED | OUTPATIENT
Start: 2020-11-28 | End: 2020-11-28 | Stop reason: SURG

## 2020-11-28 RX ORDER — FENTANYL CITRATE 50 UG/ML
INJECTION, SOLUTION INTRAMUSCULAR; INTRAVENOUS AS NEEDED
Status: DISCONTINUED | OUTPATIENT
Start: 2020-11-28 | End: 2020-11-28 | Stop reason: SURG

## 2020-11-28 RX ORDER — DOCUSATE SODIUM 100 MG/1
100 CAPSULE, LIQUID FILLED ORAL 2 TIMES DAILY
Status: DISCONTINUED | OUTPATIENT
Start: 2020-11-28 | End: 2020-11-29 | Stop reason: HOSPADM

## 2020-11-28 RX ORDER — EPHEDRINE SULFATE 50 MG/ML
5 INJECTION, SOLUTION INTRAVENOUS ONCE AS NEEDED
Status: DISCONTINUED | OUTPATIENT
Start: 2020-11-28 | End: 2020-11-28 | Stop reason: HOSPADM

## 2020-11-28 RX ORDER — SODIUM CHLORIDE 0.9 % (FLUSH) 0.9 %
3 SYRINGE (ML) INJECTION EVERY 12 HOURS SCHEDULED
Status: DISCONTINUED | OUTPATIENT
Start: 2020-11-28 | End: 2020-11-28 | Stop reason: HOSPADM

## 2020-11-28 RX ORDER — LABETALOL HYDROCHLORIDE 5 MG/ML
5 INJECTION, SOLUTION INTRAVENOUS
Status: DISCONTINUED | OUTPATIENT
Start: 2020-11-28 | End: 2020-11-28 | Stop reason: HOSPADM

## 2020-11-28 RX ORDER — ACETAMINOPHEN 325 MG/1
650 TABLET ORAL EVERY 4 HOURS PRN
Status: DISCONTINUED | OUTPATIENT
Start: 2020-11-28 | End: 2020-11-29 | Stop reason: HOSPADM

## 2020-11-28 RX ORDER — SODIUM CHLORIDE 0.9 % (FLUSH) 0.9 %
3-10 SYRINGE (ML) INJECTION AS NEEDED
Status: DISCONTINUED | OUTPATIENT
Start: 2020-11-28 | End: 2020-11-28 | Stop reason: HOSPADM

## 2020-11-28 RX ORDER — DEXAMETHASONE SODIUM PHOSPHATE 10 MG/ML
INJECTION INTRAMUSCULAR; INTRAVENOUS AS NEEDED
Status: DISCONTINUED | OUTPATIENT
Start: 2020-11-28 | End: 2020-11-28 | Stop reason: SURG

## 2020-11-28 RX ORDER — OXYCODONE AND ACETAMINOPHEN 7.5; 325 MG/1; MG/1
1 TABLET ORAL ONCE AS NEEDED
Status: COMPLETED | OUTPATIENT
Start: 2020-11-28 | End: 2020-11-28

## 2020-11-28 RX ORDER — HYDRALAZINE HYDROCHLORIDE 20 MG/ML
5 INJECTION INTRAMUSCULAR; INTRAVENOUS
Status: DISCONTINUED | OUTPATIENT
Start: 2020-11-28 | End: 2020-11-28 | Stop reason: HOSPADM

## 2020-11-28 RX ORDER — ONDANSETRON 2 MG/ML
INJECTION INTRAMUSCULAR; INTRAVENOUS AS NEEDED
Status: DISCONTINUED | OUTPATIENT
Start: 2020-11-28 | End: 2020-11-28 | Stop reason: SURG

## 2020-11-28 RX ORDER — PROMETHAZINE HYDROCHLORIDE 25 MG/1
25 TABLET ORAL ONCE AS NEEDED
Status: DISCONTINUED | OUTPATIENT
Start: 2020-11-28 | End: 2020-11-28 | Stop reason: HOSPADM

## 2020-11-28 RX ORDER — MAGNESIUM HYDROXIDE 1200 MG/15ML
LIQUID ORAL AS NEEDED
Status: DISCONTINUED | OUTPATIENT
Start: 2020-11-28 | End: 2020-11-28 | Stop reason: HOSPADM

## 2020-11-28 RX ORDER — SODIUM CHLORIDE 0.9 % (FLUSH) 0.9 %
10 SYRINGE (ML) INJECTION EVERY 12 HOURS SCHEDULED
Status: DISCONTINUED | OUTPATIENT
Start: 2020-11-28 | End: 2020-11-29 | Stop reason: HOSPADM

## 2020-11-28 RX ORDER — NALOXONE HCL 0.4 MG/ML
0.2 VIAL (ML) INJECTION AS NEEDED
Status: DISCONTINUED | OUTPATIENT
Start: 2020-11-28 | End: 2020-11-28 | Stop reason: HOSPADM

## 2020-11-28 RX ORDER — ACETAMINOPHEN 160 MG/5ML
650 SOLUTION ORAL EVERY 4 HOURS PRN
Status: DISCONTINUED | OUTPATIENT
Start: 2020-11-28 | End: 2020-11-29 | Stop reason: HOSPADM

## 2020-11-28 RX ADMIN — CEFTRIAXONE SODIUM 2 G: 2 INJECTION, SOLUTION INTRAVENOUS at 22:17

## 2020-11-28 RX ADMIN — FENTANYL CITRATE 25 MCG: 50 INJECTION INTRAMUSCULAR; INTRAVENOUS at 08:02

## 2020-11-28 RX ADMIN — PROPOFOL 200 MG: 10 INJECTION, EMULSION INTRAVENOUS at 08:02

## 2020-11-28 RX ADMIN — SODIUM CHLORIDE, PRESERVATIVE FREE 10 ML: 5 INJECTION INTRAVENOUS at 01:36

## 2020-11-28 RX ADMIN — SODIUM CHLORIDE 100 ML/HR: 9 INJECTION, SOLUTION INTRAVENOUS at 01:36

## 2020-11-28 RX ADMIN — FAMOTIDINE 20 MG: 10 INJECTION INTRAVENOUS at 07:48

## 2020-11-28 RX ADMIN — FENTANYL CITRATE 50 MCG: 50 INJECTION, SOLUTION INTRAMUSCULAR; INTRAVENOUS at 07:47

## 2020-11-28 RX ADMIN — SODIUM CHLORIDE, POTASSIUM CHLORIDE, SODIUM LACTATE AND CALCIUM CHLORIDE 9 ML/HR: 600; 310; 30; 20 INJECTION, SOLUTION INTRAVENOUS at 07:47

## 2020-11-28 RX ADMIN — OXYCODONE HYDROCHLORIDE AND ACETAMINOPHEN 1 TABLET: 7.5; 325 TABLET ORAL at 09:00

## 2020-11-28 RX ADMIN — DOCUSATE SODIUM 100 MG: 100 CAPSULE, LIQUID FILLED ORAL at 22:17

## 2020-11-28 RX ADMIN — FENTANYL CITRATE 25 MCG: 50 INJECTION INTRAMUSCULAR; INTRAVENOUS at 08:18

## 2020-11-28 RX ADMIN — SODIUM CHLORIDE, PRESERVATIVE FREE 10 ML: 5 INJECTION INTRAVENOUS at 22:20

## 2020-11-28 RX ADMIN — DEXAMETHASONE SODIUM PHOSPHATE 4 MG: 10 INJECTION INTRAMUSCULAR; INTRAVENOUS at 08:07

## 2020-11-28 RX ADMIN — LIDOCAINE HYDROCHLORIDE 80 MG: 20 INJECTION, SOLUTION INFILTRATION; PERINEURAL at 08:02

## 2020-11-28 RX ADMIN — ONDANSETRON HYDROCHLORIDE 4 MG: 2 SOLUTION INTRAMUSCULAR; INTRAVENOUS at 08:07

## 2020-11-28 RX ADMIN — FENTANYL CITRATE 50 MCG: 50 INJECTION INTRAMUSCULAR; INTRAVENOUS at 08:25

## 2020-11-28 NOTE — ANESTHESIA PROCEDURE NOTES
Airway  Urgency: elective    Date/Time: 11/28/2020 8:04 AM  Airway not difficult    General Information and Staff    Patient location during procedure: OR  CRNA: Nohemi Infante CRNA    Indications and Patient Condition  Indications for airway management: airway protection    Preoxygenated: yes  Mask difficulty assessment: 0 - not attempted    Final Airway Details  Final airway type: supraglottic airway      Successful airway: classic  Size 4    Number of attempts at approach: 1  Assessment: lips, teeth, and gum same as pre-op and atraumatic intubation    Additional Comments  LMA inserted without difficulty.  Lips and teeth intact as preop condition.  No signs or symptoms of gastric regurgitation.  Seal with minimal pressure and secure.

## 2020-11-28 NOTE — ANESTHESIA PREPROCEDURE EVALUATION
Anesthesia Evaluation     no history of anesthetic complications:  NPO Solid Status: > 8 hours  NPO Liquid Status: > 2 hours           Airway   Mallampati: II  Neck ROM: full  no difficulty expected  Dental - normal exam     Pulmonary - negative pulmonary ROS and normal exam   (-) COPD, asthma, sleep apnea, not a smoker    PE comment: nonlabored  Cardiovascular - normal exam    Rhythm: regular  Rate: normal    (+) hypertension, hyperlipidemia,   (-) valvular problems/murmurs, past MI, CAD, dysrhythmias, angina      Neuro/Psych- negative ROS  (-) seizures, TIA, CVA  GI/Hepatic/Renal/Endo    (+)   renal disease (R hydroureteronephrosis ) stones,   (-) GERD, liver disease, diabetes, no thyroid disorder    Musculoskeletal     (+) back pain,   Abdominal    Substance History      OB/GYN          Other                      Anesthesia Plan    ASA 2 - emergent     general     intravenous induction     Anesthetic plan, all risks, benefits, and alternatives have been provided, discussed and informed consent has been obtained with: patient.

## 2020-11-28 NOTE — ANESTHESIA POSTPROCEDURE EVALUATION
"Patient: Joy C Sprigler    Procedure Summary     Date: 11/28/20 Room / Location: [facility] OR  / [facility] OR    Anesthesia Start: 0754 Anesthesia Stop: 0840    Procedure: RIGHT URETERAL STONE EXTRACTION WITH LITHOPAXY OF STONE AND STENT PLACEMENT (Right ) Diagnosis:     Surgeon: Enrique Youssef MD Provider: Wayne Birmingham MD    Anesthesia Type: general ASA Status: 2 - Emergent          Anesthesia Type: general    Vitals  Vitals Value Taken Time   /76 11/28/20 0850   Temp 36.5 °C (97.7 °F) 11/28/20 0837   Pulse 82 11/28/20 0850   Resp 14 11/28/20 0837   SpO2 99 % 11/28/20 0854   Vitals shown include unvalidated device data.        Post Anesthesia Care and Evaluation    Patient location during evaluation: bedside  Patient participation: complete - patient participated  Level of consciousness: awake  Pain management: adequate  Airway patency: patent  Anesthetic complications: No anesthetic complications    Cardiovascular status: acceptable  Respiratory status: acceptable  Hydration status: acceptable    Comments: *BP 94/79   Pulse 81   Temp 36.5 °C (97.7 °F) (Oral)   Resp 14   Ht 152.4 cm (60\")   Wt 57.1 kg (125 lb 12.8 oz)   SpO2 98%   BMI 24.57 kg/m²         "

## 2020-11-29 ENCOUNTER — READMISSION MANAGEMENT (OUTPATIENT)
Dept: CALL CENTER | Facility: HOSPITAL | Age: 80
End: 2020-11-29

## 2020-11-29 VITALS
TEMPERATURE: 98.3 F | HEIGHT: 60 IN | DIASTOLIC BLOOD PRESSURE: 71 MMHG | WEIGHT: 125.8 LBS | BODY MASS INDEX: 24.7 KG/M2 | RESPIRATION RATE: 18 BRPM | OXYGEN SATURATION: 94 % | SYSTOLIC BLOOD PRESSURE: 135 MMHG | HEART RATE: 80 BPM

## 2020-11-29 LAB
ANION GAP SERPL CALCULATED.3IONS-SCNC: 7 MMOL/L (ref 5–15)
BUN SERPL-MCNC: 14 MG/DL (ref 8–23)
BUN/CREAT SERPL: 21.9 (ref 7–25)
CALCIUM SPEC-SCNC: 8.6 MG/DL (ref 8.6–10.5)
CHLORIDE SERPL-SCNC: 104 MMOL/L (ref 98–107)
CO2 SERPL-SCNC: 26 MMOL/L (ref 22–29)
CREAT SERPL-MCNC: 0.64 MG/DL (ref 0.57–1)
DEPRECATED RDW RBC AUTO: 41.4 FL (ref 37–54)
ERYTHROCYTE [DISTWIDTH] IN BLOOD BY AUTOMATED COUNT: 13.1 % (ref 12.3–15.4)
GFR SERPL CREATININE-BSD FRML MDRD: 89 ML/MIN/1.73
GLUCOSE SERPL-MCNC: 121 MG/DL (ref 65–99)
HCT VFR BLD AUTO: 30.9 % (ref 34–46.6)
HGB BLD-MCNC: 10.3 G/DL (ref 12–15.9)
MCH RBC QN AUTO: 28.9 PG (ref 26.6–33)
MCHC RBC AUTO-ENTMCNC: 33.3 G/DL (ref 31.5–35.7)
MCV RBC AUTO: 86.6 FL (ref 79–97)
PLATELET # BLD AUTO: 323 10*3/MM3 (ref 140–450)
PMV BLD AUTO: 10.7 FL (ref 6–12)
POTASSIUM SERPL-SCNC: 3.3 MMOL/L (ref 3.5–5.2)
RBC # BLD AUTO: 3.57 10*6/MM3 (ref 3.77–5.28)
SODIUM SERPL-SCNC: 137 MMOL/L (ref 136–145)
WBC # BLD AUTO: 9.8 10*3/MM3 (ref 3.4–10.8)

## 2020-11-29 PROCEDURE — 63710000001 POTASSIUM CHLORIDE 10 MEQ TABLET CONTROLLED-RELEASE: Performed by: NURSE PRACTITIONER

## 2020-11-29 PROCEDURE — 63710000001 DOCUSATE SODIUM 100 MG CAPSULE: Performed by: NURSE PRACTITIONER

## 2020-11-29 PROCEDURE — 80048 BASIC METABOLIC PNL TOTAL CA: CPT | Performed by: NURSE PRACTITIONER

## 2020-11-29 PROCEDURE — G0378 HOSPITAL OBSERVATION PER HR: HCPCS

## 2020-11-29 PROCEDURE — A9270 NON-COVERED ITEM OR SERVICE: HCPCS | Performed by: NURSE PRACTITIONER

## 2020-11-29 PROCEDURE — 85027 COMPLETE CBC AUTOMATED: CPT | Performed by: NURSE PRACTITIONER

## 2020-11-29 RX ORDER — SULFAMETHOXAZOLE AND TRIMETHOPRIM 800; 160 MG/1; MG/1
1 TABLET ORAL 2 TIMES DAILY
Qty: 6 TABLET | Refills: 0 | Status: SHIPPED | OUTPATIENT
Start: 2020-11-29 | End: 2020-12-02

## 2020-11-29 RX ORDER — POTASSIUM CHLORIDE 750 MG/1
40 TABLET, FILM COATED, EXTENDED RELEASE ORAL AS NEEDED
Status: DISCONTINUED | OUTPATIENT
Start: 2020-11-29 | End: 2020-11-29 | Stop reason: HOSPADM

## 2020-11-29 RX ORDER — AMLODIPINE BESYLATE AND BENAZEPRIL HYDROCHLORIDE 5; 10 MG/1; MG/1
1 CAPSULE ORAL DAILY
Qty: 90 CAPSULE | Refills: 1
Start: 2020-11-29 | End: 2021-04-01

## 2020-11-29 RX ORDER — ACETAMINOPHEN 325 MG/1
650 TABLET ORAL EVERY 4 HOURS PRN
Qty: 30 TABLET | Refills: 0
Start: 2020-11-29 | End: 2023-03-22

## 2020-11-29 RX ORDER — POTASSIUM CHLORIDE 20 MEQ/1
20 TABLET, EXTENDED RELEASE ORAL DAILY
Qty: 3 TABLET | Refills: 0 | Status: SHIPPED | OUTPATIENT
Start: 2020-11-29 | End: 2020-12-02

## 2020-11-29 RX ORDER — POTASSIUM CHLORIDE 1.5 G/1.77G
40 POWDER, FOR SOLUTION ORAL AS NEEDED
Status: DISCONTINUED | OUTPATIENT
Start: 2020-11-29 | End: 2020-11-29 | Stop reason: HOSPADM

## 2020-11-29 RX ADMIN — POTASSIUM CHLORIDE 40 MEQ: 750 TABLET, EXTENDED RELEASE ORAL at 14:58

## 2020-11-29 RX ADMIN — DOCUSATE SODIUM 100 MG: 100 CAPSULE, LIQUID FILLED ORAL at 09:52

## 2020-11-29 RX ADMIN — SODIUM CHLORIDE, PRESERVATIVE FREE 10 ML: 5 INJECTION INTRAVENOUS at 09:47

## 2020-11-29 NOTE — OUTREACH NOTE
Prep Survey      Responses   [facility] patient discharged from?  [place]   Is LACE score < 7 ?  Yes   Eligibility  [facility]   Date of Admission  11/27/20   Date of Discharge  11/29/20   Discharge Disposition  Home or Self Care   Discharge diagnosis  Right ureteral stone, UTI,   Cystoscopy right ureteral stone extraction litholapaxy of stone with stone extraction.  Right ureteral stent placement.   Does the patient have one of the following disease processes/diagnoses(primary or secondary)?  Other   Does the patient have Home health ordered?  No   Is there a DME ordered?  No   Prep survey completed?  Yes          Brianne Henderson RN

## 2020-11-30 ENCOUNTER — TRANSITIONAL CARE MANAGEMENT TELEPHONE ENCOUNTER (OUTPATIENT)
Dept: CALL CENTER | Facility: HOSPITAL | Age: 80
End: 2020-11-30

## 2020-11-30 NOTE — OUTREACH NOTE
Call Center TCM Note      Responses   [facility] patient discharged from?  [place]   Does the patient have one of the following disease processes/diagnoses(primary or secondary)?  Other   TCM attempt successful?  Yes   Call start time  0944   Call end time  0951   Discharge diagnosis  Right ureteral stone, UTI,   Cystoscopy right ureteral stone extraction litholapaxy of stone with stone extraction.  Right ureteral stent placement.   Meds reviewed with patient/caregiver?  Yes   Is the patient having any side effects they believe may be caused by any medication additions or changes?  No   Does the patient have all medications ordered at discharge?  Yes   Is the patient taking all medications as directed (includes completed medication regime)?  Yes   Medication comments  States she has not needed to take tylenol for pain   Does the patient have a primary care provider?   Yes   Does the patient have an appointment with their PCP within 7 days of discharge?  No   What is preventing the patient from scheduling follow up appointments within 7 days of discharge?  Haven't had time   Nursing Interventions  Educated patient on importance of making appointment, Advised patient to make appointment   Has the patient kept scheduled appointments due by today?  N/A   Comments  Patient has appt with Dr. Youssef on Wed 12/ 2 and if possible would like to see Dr. Joyner after.  Will message office to see if this is a possibility   Has home health visited the patient within 72 hours of discharge?  N/A   Psychosocial issues?  No   Did the patient receive a copy of their discharge instructions?  Yes   Nursing interventions  Reviewed instructions with patient   What is the patient's perception of their health status since discharge?  New symptoms unrelated to diagnosis   Is the patient/caregiver able to teach back signs and symptoms related to disease process for when to call PCP?  Yes   Is the patient/caregiver able to teach  back signs and symptoms related to disease process for when to call 911?  Yes   Is the patient/caregiver able to teach back the hierarchy of who to call/visit for symptoms/problems? PCP, Specialist, Home health nurse, Urgent Care, ED, 911  Yes   Additional teach back comments  Patient states she is having trouble with controlling her bladder.  States she has bladder prolapse and she is going to see Dr. Youssef regarding this on Wed.   TCM call completed?  Yes   Wrap up additional comments  Will message office regarding if she could have appt after 10:30 on 12/2 due to already being in the area for appt with Dr. Youssef.  Also, if she should have blood work due to decrease in potassium levels.          Humaira Fischer LPN    11/30/2020, 09:54 EST

## 2020-12-02 LAB
BACTERIA SPEC AEROBE CULT: NORMAL
BACTERIA SPEC AEROBE CULT: NORMAL

## 2020-12-08 LAB
CALCIUM OXALATE DIHYDRATE MFR STONE IR: 60 %
COLOR STONE: NORMAL
COM MFR STONE: 40 %
COMPN STONE: NORMAL
LABORATORY COMMENT REPORT: NORMAL
Lab: NORMAL
Lab: NORMAL
PHOTO: NORMAL
SIZE STONE: NORMAL MM
SPEC SOURCE SUBJ: NORMAL
WT STONE: 473 MG

## 2020-12-16 ENCOUNTER — OFFICE VISIT (OUTPATIENT)
Dept: FAMILY MEDICINE CLINIC | Facility: CLINIC | Age: 80
End: 2020-12-16

## 2020-12-16 VITALS
DIASTOLIC BLOOD PRESSURE: 77 MMHG | HEIGHT: 60 IN | SYSTOLIC BLOOD PRESSURE: 110 MMHG | TEMPERATURE: 96.1 F | OXYGEN SATURATION: 99 % | HEART RATE: 79 BPM | WEIGHT: 124 LBS | RESPIRATION RATE: 16 BRPM | BODY MASS INDEX: 24.35 KG/M2

## 2020-12-16 DIAGNOSIS — D64.9 ANEMIA, UNSPECIFIED TYPE: ICD-10-CM

## 2020-12-16 DIAGNOSIS — R19.09 OTHER INTRA-ABDOMINAL AND PELVIC SWELLING, MASS AND LUMP: ICD-10-CM

## 2020-12-16 DIAGNOSIS — N13.30 HYDROURETERONEPHROSIS: ICD-10-CM

## 2020-12-16 DIAGNOSIS — N30.00 ACUTE CYSTITIS WITHOUT HEMATURIA: ICD-10-CM

## 2020-12-16 DIAGNOSIS — E87.6 HYPOKALEMIA: ICD-10-CM

## 2020-12-16 DIAGNOSIS — N94.89 ADNEXAL MASS: ICD-10-CM

## 2020-12-16 DIAGNOSIS — N20.1 RIGHT URETERAL STONE: Primary | ICD-10-CM

## 2020-12-16 PROCEDURE — 99214 OFFICE O/P EST MOD 30 MIN: CPT | Performed by: INTERNAL MEDICINE

## 2020-12-16 RX ORDER — NITROFURANTOIN 25; 75 MG/1; MG/1
100 CAPSULE ORAL 2 TIMES DAILY
COMMUNITY
Start: 2020-12-14 | End: 2021-07-14

## 2020-12-16 NOTE — PROGRESS NOTES
Subjective Complaint is follow-up from hospitalization  Joy C Sprigler is a 80 y.o. female.     History of Present Illness Madiha is here today for follow-up.  She was hospitalized on November 27 for a right ureteral stone with a right hydronephrosis.  She also had an associated urinary tract infection.  Since that time she has been treated by urology for the stone in the infection.  She is doing another round of antibiotics currently for infection.  At the time of discharge she was a little bit anemic with a low potassium.  I did review her CAT scan of the abdomen and pelvis.  We did note that there was a left adnexal cyst.  She was unaware of this being present previously.  I could not find anything to compare this scan to.  Current outpatient and discharge medications have been reconciled for the patient.  Reviewed by: Alfred Joyner MD    The following portions of the patient's history were reviewed and updated as appropriate: allergies, current medications, past family history, past medical history, past social history, past surgical history and problem list.  .    Review of Systems   Constitutional: Negative for chills and fever.   Respiratory: Negative for chest tightness and shortness of breath.    Genitourinary: Negative for hematuria.   Musculoskeletal: Positive for arthralgias and myalgias.       Objective   Physical Exam  Vitals signs and nursing note reviewed.   Cardiovascular:      Rate and Rhythm: Normal rate and regular rhythm.      Pulses: Normal pulses.   Pulmonary:      Effort: Pulmonary effort is normal.      Breath sounds: Normal breath sounds. No wheezing or rales.   Abdominal:      General: Abdomen is flat. Bowel sounds are normal.      Tenderness: There is no abdominal tenderness. There is no guarding or rebound.   Musculoskeletal:      Right lower leg: No edema.      Left lower leg: No edema.   Skin:     Comments: There is a nodule in the palm of her hand between her thumb and index  finger on the right hand.   Neurological:      Mental Status: She is alert.           Assessment/Plan   Diagnoses and all orders for this visit:    1. Right ureteral stone (Primary)    2. Hydroureteronephrosis    3. Acute cystitis without hematuria    4. Anemia, unspecified type  -     CBC & Differential    5. Hypokalemia  -     Basic Metabolic Panel    6. Adnexal mass  -         7. Other intra-abdominal and pelvic swelling, mass and lump   -           Madiha is here today for follow-up.  She seems to be doing well since passing the stone but it seems to be plagued with some recurrent urinary tract infections.  I am going to follow-up on her anemia and her potassium.  Because of the adnexal cyst I am going to check a CA-125.

## 2020-12-17 LAB
BASOPHILS # BLD AUTO: 0.08 10*3/MM3 (ref 0–0.2)
BASOPHILS NFR BLD AUTO: 0.9 % (ref 0–1.5)
BUN SERPL-MCNC: 17 MG/DL (ref 8–23)
BUN/CREAT SERPL: 23.3 (ref 7–25)
CALCIUM SERPL-MCNC: 10 MG/DL (ref 8.6–10.5)
CANCER AG125 SERPL-ACNC: 8.7 U/ML (ref 0–38.1)
CHLORIDE SERPL-SCNC: 101 MMOL/L (ref 98–107)
CO2 SERPL-SCNC: 28.7 MMOL/L (ref 22–29)
CREAT SERPL-MCNC: 0.73 MG/DL (ref 0.57–1)
EOSINOPHIL # BLD AUTO: 0.35 10*3/MM3 (ref 0–0.4)
EOSINOPHIL NFR BLD AUTO: 3.9 % (ref 0.3–6.2)
ERYTHROCYTE [DISTWIDTH] IN BLOOD BY AUTOMATED COUNT: 13.3 % (ref 12.3–15.4)
GLUCOSE SERPL-MCNC: 85 MG/DL (ref 65–99)
HCT VFR BLD AUTO: 39.1 % (ref 34–46.6)
HGB BLD-MCNC: 12.9 G/DL (ref 12–15.9)
IMM GRANULOCYTES # BLD AUTO: 0.02 10*3/MM3 (ref 0–0.05)
IMM GRANULOCYTES NFR BLD AUTO: 0.2 % (ref 0–0.5)
LYMPHOCYTES # BLD AUTO: 2.88 10*3/MM3 (ref 0.7–3.1)
LYMPHOCYTES NFR BLD AUTO: 32.1 % (ref 19.6–45.3)
MCH RBC QN AUTO: 28.3 PG (ref 26.6–33)
MCHC RBC AUTO-ENTMCNC: 33 G/DL (ref 31.5–35.7)
MCV RBC AUTO: 85.7 FL (ref 79–97)
MONOCYTES # BLD AUTO: 0.68 10*3/MM3 (ref 0.1–0.9)
MONOCYTES NFR BLD AUTO: 7.6 % (ref 5–12)
NEUTROPHILS # BLD AUTO: 4.95 10*3/MM3 (ref 1.7–7)
NEUTROPHILS NFR BLD AUTO: 55.3 % (ref 42.7–76)
NRBC BLD AUTO-RTO: 0 /100 WBC (ref 0–0.2)
PLATELET # BLD AUTO: 466 10*3/MM3 (ref 140–450)
POTASSIUM SERPL-SCNC: 4.2 MMOL/L (ref 3.5–5.2)
RBC # BLD AUTO: 4.56 10*6/MM3 (ref 3.77–5.28)
SODIUM SERPL-SCNC: 139 MMOL/L (ref 136–145)
WBC # BLD AUTO: 8.96 10*3/MM3 (ref 3.4–10.8)

## 2021-04-01 RX ORDER — AMLODIPINE BESYLATE AND BENAZEPRIL HYDROCHLORIDE 5; 10 MG/1; MG/1
CAPSULE ORAL
Qty: 90 CAPSULE | Refills: 0 | Status: SHIPPED | OUTPATIENT
Start: 2021-04-01 | End: 2021-07-01

## 2021-05-13 ENCOUNTER — TRANSCRIBE ORDERS (OUTPATIENT)
Dept: ADMINISTRATIVE | Facility: HOSPITAL | Age: 81
End: 2021-05-13

## 2021-05-13 DIAGNOSIS — Z12.31 VISIT FOR SCREENING MAMMOGRAM: Primary | ICD-10-CM

## 2021-07-01 RX ORDER — AMLODIPINE BESYLATE AND BENAZEPRIL HYDROCHLORIDE 5; 10 MG/1; MG/1
CAPSULE ORAL
Qty: 90 CAPSULE | Refills: 0 | Status: SHIPPED | OUTPATIENT
Start: 2021-07-01 | End: 2021-09-28

## 2021-07-08 ENCOUNTER — HOSPITAL ENCOUNTER (OUTPATIENT)
Dept: MAMMOGRAPHY | Facility: HOSPITAL | Age: 81
Discharge: HOME | End: 2021-07-08
Admitting: INTERNAL MEDICINE

## 2021-07-08 DIAGNOSIS — Z12.31 VISIT FOR SCREENING MAMMOGRAM: ICD-10-CM

## 2021-07-08 PROCEDURE — 77063 BREAST TOMOSYNTHESIS BI: CPT

## 2021-07-08 PROCEDURE — 77067 SCR MAMMO BI INCL CAD: CPT

## 2021-07-14 ENCOUNTER — OFFICE VISIT (OUTPATIENT)
Dept: FAMILY MEDICINE CLINIC | Facility: CLINIC | Age: 81
End: 2021-07-14

## 2021-07-14 VITALS
SYSTOLIC BLOOD PRESSURE: 110 MMHG | WEIGHT: 123.6 LBS | HEIGHT: 60 IN | TEMPERATURE: 97.5 F | BODY MASS INDEX: 24.26 KG/M2 | RESPIRATION RATE: 20 BRPM | DIASTOLIC BLOOD PRESSURE: 70 MMHG | OXYGEN SATURATION: 99 % | HEART RATE: 74 BPM

## 2021-07-14 DIAGNOSIS — M70.61 TROCHANTERIC BURSITIS OF BOTH HIPS: ICD-10-CM

## 2021-07-14 DIAGNOSIS — M70.62 TROCHANTERIC BURSITIS OF BOTH HIPS: ICD-10-CM

## 2021-07-14 DIAGNOSIS — M79.10 MYALGIA: Primary | ICD-10-CM

## 2021-07-14 PROCEDURE — 99213 OFFICE O/P EST LOW 20 MIN: CPT | Performed by: INTERNAL MEDICINE

## 2021-07-14 NOTE — PROGRESS NOTES
Subjective Complaint is leg pain  Joy C Sprigler is a 80 y.o. female.     History of Present Illness Madiha is here today with complaints of pain in her buttocks and in her thighs.  This is very similar to pain she had in her shoulder girdle last year.  She did have an elevated sedimentation rate and CRP last year.  We sent her to the rheumatologist.  She seemed to get better with steroids.  She is now experiencing similar type symptoms in her hip and buttock but not as severe.  This began in April.  It seems to be worse first thing in the morning.  She has some stiffness but then it gets better somewhat as the day goes on.  Ibuprofen has been helping.    The following portions of the patient's history were reviewed and updated as appropriate: allergies, current medications, past family history, past medical history, past social history, past surgical history and problem list.    Review of Systems   Constitutional: Negative for chills and fever.   Respiratory: Negative for chest tightness and shortness of breath.    Cardiovascular: Negative for chest pain.   Musculoskeletal: Positive for arthralgias and myalgias.   Neurological: Negative for dizziness, light-headedness and headache.       Objective   Physical Exam  Vitals and nursing note reviewed.   Constitutional:       Appearance: Normal appearance.   Cardiovascular:      Rate and Rhythm: Normal rate and regular rhythm.   Pulmonary:      Effort: Pulmonary effort is normal.      Breath sounds: No wheezing, rhonchi or rales.   Musculoskeletal:      Right lower leg: No edema.      Left lower leg: No edema.      Comments: She has fairly good external rotation and somewhat limited internal rotation of the hips.  There is no pain with this.  She does have some tenderness at the posterior superior iliac spine.  She really does not have any piriformis tenderness but is tender in the hip flexors on both sides.  She is also tender on the trochanters on both sides.    Neurological:      Mental Status: She is alert.           Assessment/Plan   Diagnoses and all orders for this visit:    1. Myalgia (Primary)  -     Sedimentation Rate  -     C-reactive Protein    2. Trochanteric bursitis of both hips      Madiha is here today for pains in her buttock and legs.  I do not think this is going to be the polymyalgia that she had last time.  I am going to check a sedimentation rate and C-reactive protein.  However I think this is more bursitis and tendinitis.

## 2021-07-15 LAB
CRP SERPL-MCNC: 2 MG/L (ref 0–10)
ERYTHROCYTE [SEDIMENTATION RATE] IN BLOOD BY WESTERGREN METHOD: 2 MM/HR (ref 0–40)

## 2021-09-28 RX ORDER — AMLODIPINE BESYLATE AND BENAZEPRIL HYDROCHLORIDE 5; 10 MG/1; MG/1
CAPSULE ORAL
Qty: 90 CAPSULE | Refills: 0 | Status: SHIPPED | OUTPATIENT
Start: 2021-09-28 | End: 2021-12-27

## 2021-09-28 NOTE — TELEPHONE ENCOUNTER
Rx Refill Note  Requested Prescriptions     Pending Prescriptions Disp Refills   • amLODIPine-benazepril (LOTREL 5-10) 5-10 MG per capsule [Pharmacy Med Name: AMLODIPINE-BENAZ 5/10MG CAPSULES] 90 capsule 0     Sig: TAKE 1 CAPSULE BY MOUTH DAILY      Last office visit with prescribing clinician: 7/14/2021      Next office visit with prescribing clinician: Visit date not found            Candido Tillman MA  09/28/21, 07:07 EDT

## 2021-11-10 ENCOUNTER — OFFICE VISIT (OUTPATIENT)
Dept: FAMILY MEDICINE CLINIC | Facility: CLINIC | Age: 81
End: 2021-11-10

## 2021-11-10 VITALS
DIASTOLIC BLOOD PRESSURE: 70 MMHG | WEIGHT: 123 LBS | OXYGEN SATURATION: 97 % | BODY MASS INDEX: 24.15 KG/M2 | HEIGHT: 60 IN | SYSTOLIC BLOOD PRESSURE: 110 MMHG | HEART RATE: 95 BPM

## 2021-11-10 DIAGNOSIS — J06.9 URI WITH COUGH AND CONGESTION: Primary | ICD-10-CM

## 2021-11-10 PROCEDURE — 99214 OFFICE O/P EST MOD 30 MIN: CPT | Performed by: INTERNAL MEDICINE

## 2021-11-10 RX ORDER — BROMPHENIRAMINE MALEATE, PSEUDOEPHEDRINE HYDROCHLORIDE, AND DEXTROMETHORPHAN HYDROBROMIDE 2; 30; 10 MG/5ML; MG/5ML; MG/5ML
5 SYRUP ORAL 4 TIMES DAILY PRN
Qty: 118 ML | Refills: 1 | Status: SHIPPED | OUTPATIENT
Start: 2021-11-10 | End: 2022-04-08 | Stop reason: SDUPTHER

## 2021-11-10 RX ORDER — BENZONATATE 100 MG/1
100 CAPSULE ORAL 3 TIMES DAILY
Qty: 30 CAPSULE | Refills: 1 | Status: SHIPPED | OUTPATIENT
Start: 2021-11-10 | End: 2022-06-06

## 2021-11-10 NOTE — PROGRESS NOTES
Subjective Chief complaint is cough and facial pressure  Joy C Sprigler is a 81 y.o. female.     History of Present Illness Madiha is here today for complaints of cough and facial pressure.  The symptoms actually began his sneezing approximately 5 days ago.  Then she developed some sinus pressure and congestion.  She got concerned when she developed a cough yesterday.  She is coughing so hard it is hurting her ribs.  She is not really any more short of breath than usual.  She can taste and smell her food okay.  She is not having any nausea vomiting or diarrhea.      The following portions of the patient's history were reviewed and updated as appropriate: allergies, current medications, past family history, past medical history, past social history, past surgical history and problem list.    Review of Systems   Constitutional: Negative for chills and fever.   HENT: Positive for congestion and sinus pressure.    Respiratory: Positive for cough.        Objective   Physical Exam  Vitals and nursing note reviewed.   Constitutional:       Appearance: Normal appearance.   HENT:      Right Ear: Tympanic membrane and ear canal normal.      Left Ear: Tympanic membrane and ear canal normal.      Nose: Congestion present.      Comments: Rhinorrhea is clear.  There is no significant erythema     Mouth/Throat:      Mouth: Mucous membranes are moist.      Pharynx: Oropharynx is clear.   Cardiovascular:      Rate and Rhythm: Normal rate and regular rhythm.   Pulmonary:      Effort: Pulmonary effort is normal.      Breath sounds: No wheezing, rhonchi or rales.   Lymphadenopathy:      Cervical: No cervical adenopathy.   Neurological:      Mental Status: She is alert.           Assessment/Plan   Diagnoses and all orders for this visit:    1. URI with cough and congestion (Primary)  -     COVID-19,[facility] ROUTINE, NP/OP SWAB IN TRANSPORT MEDIA OR ESWAB 72 HR TAT - Swab, Nasopharynx    Other orders  -     brompheniramine-pseudoephedrine-DM  30-2-10 MG/5ML syrup; Take 5 mL by mouth 4 (Four) Times a Day As Needed for Congestion or Cough.  Dispense: 118 mL; Refill: 1  -     benzonatate (Tessalon Perles) 100 MG capsule; Take 1 capsule by mouth 3 (Three) Times a Day.  Dispense: 30 capsule; Refill: 1      Madiha is here today for cough and congestion.  I am going to give her some Tessalon Perles and have her take those routinely for cough.  She can then use some Bromfed on an as-needed basis for the cough and congestion.  We did do a Covid test today but I am not convinced this is going to be positive.

## 2021-11-11 LAB
LABCORP SARS-COV-2, NAA 2 DAY TAT: NORMAL
SARS-COV-2 RNA RESP QL NAA+PROBE: NOT DETECTED

## 2021-12-27 RX ORDER — AMLODIPINE BESYLATE AND BENAZEPRIL HYDROCHLORIDE 5; 10 MG/1; MG/1
CAPSULE ORAL
Qty: 90 CAPSULE | Refills: 0 | Status: SHIPPED | OUTPATIENT
Start: 2021-12-27 | End: 2022-03-31

## 2022-03-22 ENCOUNTER — TELEPHONE (OUTPATIENT)
Dept: FAMILY MEDICINE CLINIC | Facility: CLINIC | Age: 82
End: 2022-03-22

## 2022-03-23 DIAGNOSIS — H91.93 BILATERAL HEARING LOSS, UNSPECIFIED HEARING LOSS TYPE: Primary | ICD-10-CM

## 2022-03-31 RX ORDER — AMLODIPINE BESYLATE AND BENAZEPRIL HYDROCHLORIDE 5; 10 MG/1; MG/1
CAPSULE ORAL
Qty: 90 CAPSULE | Refills: 0 | Status: SHIPPED | OUTPATIENT
Start: 2022-03-31 | End: 2022-07-05

## 2022-04-08 ENCOUNTER — OFFICE VISIT (OUTPATIENT)
Dept: FAMILY MEDICINE CLINIC | Facility: CLINIC | Age: 82
End: 2022-04-08

## 2022-04-08 VITALS
TEMPERATURE: 96.4 F | RESPIRATION RATE: 18 BRPM | OXYGEN SATURATION: 93 % | HEART RATE: 90 BPM | WEIGHT: 124 LBS | HEIGHT: 60 IN | DIASTOLIC BLOOD PRESSURE: 70 MMHG | BODY MASS INDEX: 24.35 KG/M2 | SYSTOLIC BLOOD PRESSURE: 102 MMHG

## 2022-04-08 DIAGNOSIS — J40 BRONCHITIS: Primary | ICD-10-CM

## 2022-04-08 PROBLEM — IMO0002 NUCLEAR CATARACT: Status: ACTIVE | Noted: 2021-08-09

## 2022-04-08 PROBLEM — H25.9 AGE-RELATED CATARACT OF LEFT EYE: Status: ACTIVE | Noted: 2021-10-13

## 2022-04-08 LAB
EXPIRATION DATE: NORMAL
INTERNAL CONTROL: NORMAL
Lab: NORMAL
SARS-COV-2 AG UPPER RESP QL IA.RAPID: NOT DETECTED

## 2022-04-08 PROCEDURE — 87426 SARSCOV CORONAVIRUS AG IA: CPT | Performed by: FAMILY MEDICINE

## 2022-04-08 PROCEDURE — 99213 OFFICE O/P EST LOW 20 MIN: CPT | Performed by: FAMILY MEDICINE

## 2022-04-08 RX ORDER — BROMPHENIRAMINE MALEATE, PSEUDOEPHEDRINE HYDROCHLORIDE, AND DEXTROMETHORPHAN HYDROBROMIDE 2; 30; 10 MG/5ML; MG/5ML; MG/5ML
5 SYRUP ORAL 4 TIMES DAILY PRN
Qty: 118 ML | Refills: 1 | Status: SHIPPED | OUTPATIENT
Start: 2022-04-08 | End: 2022-06-06

## 2022-04-08 RX ORDER — DOXYCYCLINE 100 MG/1
100 CAPSULE ORAL 2 TIMES DAILY
Qty: 14 CAPSULE | Refills: 0 | Status: SHIPPED | OUTPATIENT
Start: 2022-04-08 | End: 2022-06-06

## 2022-04-08 NOTE — PROGRESS NOTES
HPI  Joy C Sprigler is a 81 y.o. female who is here for several day history of productive cough of yellow-greenish sputum.  Not sure if had any fever or chills.  Told me has been vaccinated for COVID but as usual epic indicates no previous vaccinations?  Will have medical assistant verify.      Review of Systems   Constitutional: Negative for chills, diaphoresis and fever.   Respiratory: Positive for cough. Negative for shortness of breath.    All other systems reviewed and are negative.        Past Medical History:   Diagnosis Date   • Female bladder prolapse    • Hyperlipemia     Pt denies   • Hypertension        Past Surgical History:   Procedure Laterality Date   • CARPAL TUNNEL RELEASE     • COLONOSCOPY N/A 12/2/2016    Procedure: COLONOSCOPY TO CECUM/TI;  Surgeon: Anthony De MD;  Location: [facility] ENDOSCOPY;  Service:    • CYSTOSCOPY W/ URETERAL STENT PLACEMENT Right 11/28/2020    Procedure: RIGHT URETERAL STONE EXTRACTION WITH LITHOPAXY OF STONE AND STENT PLACEMENT;  Surgeon: Enrique Youssef MD;  Location: [facility] OR;  Service: Urology;  Laterality: Right;   • ENDOSCOPY N/A 12/2/2016    Procedure: ESOPHAGOGASTRODUODENOSCOPY with biopsy;  Surgeon: Anthony De MD;  Location: [facility] ENDOSCOPY;  Service:    • NO PAST SURGERIES         Family History   Problem Relation Age of Onset   • Heart disease Mother    • Heart disease Father    • Heart disease Brother    • Cancer Brother         tongue       Social History     Socioeconomic History   • Marital status:    Tobacco Use   • Smoking status: Never Smoker   • Smokeless tobacco: Never Used   Substance and Sexual Activity   • Alcohol use: Yes     Comment: rarely   • Drug use: No   • Sexual activity: Defer       Vitals:    04/08/22 1002   BP: 102/70   Pulse: 90   Resp: 18   Temp: 96.4 °F (35.8 °C)   SpO2: 93%        Body mass index is 24.22 kg/m².      Physical Exam  Vitals and nursing note reviewed.   Constitutional:       General: She is not in  acute distress.     Appearance: She is well-developed.   HENT:      Head: Normocephalic and atraumatic.      Nose:      Comments: Patient with mask provider with mask and shield  Eyes:      Conjunctiva/sclera: Conjunctivae normal.      Pupils: Pupils are equal, round, and reactive to light.   Neck:      Thyroid: No thyromegaly.   Cardiovascular:      Rate and Rhythm: Normal rate and regular rhythm.      Heart sounds: Normal heart sounds.   Pulmonary:      Effort: Pulmonary effort is normal. No respiratory distress.      Breath sounds: Normal breath sounds. No wheezing, rhonchi or rales.   Abdominal:      General: There is no distension.      Palpations: Abdomen is soft. There is no mass.      Tenderness: There is no abdominal tenderness.      Hernia: No hernia is present.   Musculoskeletal:         General: No tenderness or deformity. Normal range of motion.      Cervical back: Normal range of motion.   Lymphadenopathy:      Cervical: No cervical adenopathy.   Skin:     General: Skin is warm and dry.      Coloration: Skin is not pale.      Findings: No rash.   Neurological:      General: No focal deficit present.      Mental Status: She is alert and oriented to person, place, and time.      Motor: No abnormal muscle tone.      Coordination: Coordination normal.   Psychiatric:         Mood and Affect: Mood normal.         Behavior: Behavior normal.         Thought Content: Thought content normal.         Judgment: Judgment normal.           Assessment/Plan    Diagnoses and all orders for this visit:    1. Bronchitis (Primary)      Patient presents with a productive cough and not sure if has had fever or chills etc.  Does report allergy issues over the last couple of years.  Need to verify Covid vaccine status?  Will test for Covid in the office and send prescription for bronchitis symptoms.

## 2022-06-06 ENCOUNTER — OFFICE VISIT (OUTPATIENT)
Dept: FAMILY MEDICINE CLINIC | Facility: CLINIC | Age: 82
End: 2022-06-06

## 2022-06-06 VITALS
HEIGHT: 60 IN | WEIGHT: 125 LBS | DIASTOLIC BLOOD PRESSURE: 76 MMHG | OXYGEN SATURATION: 98 % | SYSTOLIC BLOOD PRESSURE: 120 MMHG | HEART RATE: 50 BPM | BODY MASS INDEX: 24.54 KG/M2

## 2022-06-06 DIAGNOSIS — D17.22 LIPOMA OF LEFT UPPER EXTREMITY: ICD-10-CM

## 2022-06-06 DIAGNOSIS — M53.3 SACROILIAC JOINT DYSFUNCTION OF BOTH SIDES: ICD-10-CM

## 2022-06-06 DIAGNOSIS — M75.42 IMPINGEMENT SYNDROME OF LEFT SHOULDER: Primary | ICD-10-CM

## 2022-06-06 PROCEDURE — 99214 OFFICE O/P EST MOD 30 MIN: CPT | Performed by: INTERNAL MEDICINE

## 2022-06-06 RX ORDER — MELOXICAM 15 MG/1
15 TABLET ORAL DAILY
Qty: 30 TABLET | Refills: 1 | Status: SHIPPED | OUTPATIENT
Start: 2022-06-06 | End: 2022-08-02

## 2022-06-06 NOTE — PROGRESS NOTES
Subjective Complaint is back pain but also left shoulder pain  Joy C Sprigler is a 81 y.o. female.     History of Present Illness Madiha is here today for complaints of back pain.  She is pointing to the region of her sacroiliac joints bilaterally.  It is affecting her ability to walk.  Sometimes climbing steps seems to be a little bit hard for her.  She is complaining of some pain in her left shoulder.  She is also concerned about a possible lump in her left arm.    The following portions of the patient's history were reviewed and updated as appropriate: allergies, current medications, past family history, past medical history, past social history, past surgical history and problem list.    Review of Systems   Constitutional: Negative for chills and fever.   Respiratory: Negative for cough.    Cardiovascular: Negative for chest pain.       Objective   Physical Exam  Vitals and nursing note reviewed.   Neck:      Vascular: No carotid bruit.   Cardiovascular:      Rate and Rhythm: Normal rate.   Pulmonary:      Effort: Pulmonary effort is normal.   Musculoskeletal:      Comments: She has approximately 70 degrees of abduction and limited external rotation of the left shoulder.  The left bicep seems more prominent than the right.  It is hard to tell whether she could have had a previous detachment of the biceps tendon or whether she has a lipoma causing this.  I suspect it is a lipoma.  She has good internal and external rotation of both hips.  I did look at her sacroiliac joints on a CAT scan there seems to be some arthritic change there.           Assessment & Plan   Diagnoses and all orders for this visit:    1. Impingement syndrome of left shoulder (Primary)    2. Sacroiliac joint dysfunction of both sides    3. Lipoma of left upper extremity    Madiha is here today for several reasons.  She has some impingement of her left shoulder.  I am going to have her see an orthopedist about that but I am going to start her on  some meloxicam.  Her last BUN and creatinine looked okay.  She also is experiencing some sacroiliac joint pain and I suspect this is due to some arthritis.  She is going to be leaving for vacation next week.  If the meloxicam is not helping we may give her a Medrol Dosepak for the vacation.

## 2022-06-09 ENCOUNTER — TELEPHONE (OUTPATIENT)
Dept: FAMILY MEDICINE CLINIC | Facility: CLINIC | Age: 82
End: 2022-06-09

## 2022-06-09 RX ORDER — METHYLPREDNISOLONE 4 MG/1
TABLET ORAL
Qty: 21 TABLET | Refills: 0 | Status: SHIPPED | OUTPATIENT
Start: 2022-06-09 | End: 2022-06-30

## 2022-06-09 NOTE — TELEPHONE ENCOUNTER
Caller: Sprigler, Joy C    Relationship: Self    Best call back number: [phone]    What medication are you requesting: STEROID     What are your current symptoms: LOW SCIATIC BACK PAIN     How long have you been experiencing symptoms: WEEK    Have you had these symptoms before:    [x] Yes  [] No    Have you been treated for these symptoms before:   [x] Yes  [] No    If a prescription is needed, what is your preferred pharmacy and phone number: [facility] #[id number] [place] [address] [address] AT [facility] OF [facility] [address] & [facility] LINE  - [phone]  - [phone] FX      Additional notes: PATIENT SEEN TIP SARABIA Monday FOR BACK PAIN SAID TO CALL IF NOT BETTER AND HE'D CALL IN THE STEROIDS. SHE IS TAKING AN ANTIBIOTIC ALSO, CAN SHETAKE IT WITH A STEROID OR DOES SHE NEED TO STOP THE ANTIBIOTIC?

## 2022-06-13 ENCOUNTER — TELEPHONE (OUTPATIENT)
Dept: FAMILY MEDICINE CLINIC | Facility: CLINIC | Age: 82
End: 2022-06-13

## 2022-06-13 NOTE — TELEPHONE ENCOUNTER
Caller: Sprigler, Joy C    Relationship: Self    Best call back number: [phone]     What medications are you currently taking:   Current Outpatient Medications on File Prior to Visit   Medication Sig Dispense Refill   • acetaminophen (TYLENOL) 325 MG tablet Take 2 tablets by mouth Every 4 (Four) Hours As Needed for Mild Pain . 30 tablet 0   • amLODIPine-benazepril (LOTREL 5-10) 5-10 MG per capsule TAKE 1 CAPSULE BY MOUTH DAILY 90 capsule 0   • clotrimazole-betamethasone (Lotrisone) 1-0.05 % cream Apply  topically to the appropriate area as directed Every 12 (Twelve) Hours. 15 g 0   • ESTRACE VAGINAL 0.1 MG/GM vaginal cream U UTD QHS FOR 2 WEEKS THEN U THREE TIMES WEEKLY QHS  3   • meloxicam (MOBIC) 15 MG tablet Take 1 tablet by mouth Daily. 30 tablet 1   • methylPREDNISolone (MEDROL) 4 MG dose pack Take as directed on package instructions. Do not take NSAIDS while taking 21 tablet 0   • Multiple Vitamin (MULTI VITAMIN DAILY PO) Take  by mouth.       No current facility-administered medications on file prior to visit.        ADDITIONAL NOTES:    PATIENT WENT TO URGENT CARE ON Saturday NIGHT FOR PAIN IN LOWER STOMACH AS WELL AS HER BACK.  THEY DONE A URINE TEST IT SHOWED SHE HAD A UTI.  THEY PUT HER ON ANTIBIOTICS.  PATIENT WANTS TO KNOW SHOULD SHE STILL CONTINUE TO TAKE THE STEROID THAT YOU PRESCRIBED.  SHE SAID THE PHARMACIST ADVISED HER NOT TO TAKE BOTH AT THE SAME TIME.    SHE ALSO WANTS TO KNOW SHOULD SHE CONTINUE TO TAKE THE MELOXICAM?    THE URGENT CARE PLACED HER NITROFURANTOIN AS WELL AS PHENAZOPYRIDINE    PLEASE CALL AND ADVISE.    I advised that it was okay to finish the Dosepak.  It may increase her risk of having a yeast infection but she can always contact me if she develops it.    
none

## 2022-06-28 ENCOUNTER — TELEPHONE (OUTPATIENT)
Dept: FAMILY MEDICINE CLINIC | Facility: CLINIC | Age: 82
End: 2022-06-28

## 2022-06-28 NOTE — TELEPHONE ENCOUNTER
Spoke with medical records at [facility] and pt will need to sign a release of records before they send over the report the Fax number is [phone].  Pt is aware of this.

## 2022-06-28 NOTE — TELEPHONE ENCOUNTER
Caller: Sprigler, Joy C    Relationship: Self    Best call back number: [phone]    Who are you requesting to speak with (clinical staff, provider,  specific staff member): CLINICAL STAFF     What was the call regarding: PATIENT HAD A CT SCAN DONE IN [place] AND WANTS OFFICE TO GET RESULTS FOR DR. SARABIA     [facility] ER PHONE NUMBER: [phone]

## 2022-06-30 ENCOUNTER — OFFICE VISIT (OUTPATIENT)
Dept: FAMILY MEDICINE CLINIC | Facility: CLINIC | Age: 82
End: 2022-06-30

## 2022-06-30 VITALS
OXYGEN SATURATION: 98 % | SYSTOLIC BLOOD PRESSURE: 126 MMHG | HEART RATE: 85 BPM | HEIGHT: 60 IN | BODY MASS INDEX: 24.15 KG/M2 | DIASTOLIC BLOOD PRESSURE: 58 MMHG | WEIGHT: 123 LBS

## 2022-06-30 DIAGNOSIS — M79.89 LEFT LEG SWELLING: ICD-10-CM

## 2022-06-30 DIAGNOSIS — M48.062 SPINAL STENOSIS OF LUMBAR REGION WITH NEUROGENIC CLAUDICATION: Primary | ICD-10-CM

## 2022-06-30 DIAGNOSIS — M51.16 LUMBAR DISC DISEASE WITH RADICULOPATHY: ICD-10-CM

## 2022-06-30 PROCEDURE — 99214 OFFICE O/P EST MOD 30 MIN: CPT | Performed by: INTERNAL MEDICINE

## 2022-06-30 RX ORDER — GABAPENTIN 300 MG/1
300 CAPSULE ORAL 3 TIMES DAILY
Qty: 90 CAPSULE | Refills: 2 | Status: SHIPPED | OUTPATIENT
Start: 2022-06-30 | End: 2022-09-29

## 2022-06-30 NOTE — PROGRESS NOTES
Subjective Chief complaint is back pain  Joy C Sprigler is a 81 y.o. female.     History of Present Illness Madiha is here today for back pain.  I saw her for some back pain before she left for vacation.  We did not have time to do any testing at that point.  We did give her some meloxicam and when she did not get any relief from that we tried a steroid Dosepak.  She did not get any relief from that while on vacation she went to the emergency room where they did a CT scan of her lumbar spine.  I do have those results.  She does have some fairly severe spinal stenosis at L3-L4.  She is got some significant left neuroforaminal stenosis at that level and some moderate right foraminal stenoses at other levels.  She has some anterolisthesis of L5 on S1.  Today she is walking bent at the waist.  She cannot fully straighten up.  Her legs are weak.  She was given some Percocet at the emergency room but that has not helped the pain.    The following portions of the patient's history were reviewed and updated as appropriate: allergies, current medications, past family history, past medical history, past social history, past surgical history and problem list.    Review of Systems   Constitutional: Negative for chills and fever.   Cardiovascular: Positive for leg swelling.        She is complaining of some swelling in her left leg.  They did drive back from [place].  They took very few breaks.   Gastrointestinal:        She is not having incontinence of stool   Genitourinary:        She is not having any bladder incontinence       Objective   Physical Exam  Vitals and nursing note reviewed.   Constitutional:       Appearance: Normal appearance.   Cardiovascular:      Rate and Rhythm: Normal rate.   Pulmonary:      Effort: Pulmonary effort is normal.   Musculoskeletal:      Right lower leg: No edema.      Left lower leg: Edema present.      Comments: She has very minimal left lower extremity edema.  There is no palpable cord.    Neurological:      Mental Status: She is alert.           Assessment & Plan   Diagnoses and all orders for this visit:    1. Spinal stenosis of lumbar region with neurogenic claudication (Primary)  -     MRI Lumbar Spine Without Contrast; Future  -     Cancel: Ambulatory Referral to Neurosurgery  -     Ambulatory Referral to Neurosurgery    2. Lumbar disc disease with radiculopathy  -     MRI Lumbar Spine Without Contrast; Future  -     Cancel: Ambulatory Referral to Neurosurgery  -     Ambulatory Referral to Neurosurgery    3. Left leg swelling  -     D-dimer, Quantitative    Other orders  -     gabapentin (NEURONTIN) 300 MG capsule; Take 1 capsule by mouth 3 (Three) Times a Day.  Dispense: 90 capsule; Refill: 2    Madiha is here today for back pain.  I am going to get an MRI scan.  I am going to refer her to a neurosurgeon.  We are going to check a D-dimer for the leg swelling but I do not suspect she is actually going to have a clot.

## 2022-07-01 DIAGNOSIS — M79.89 LEFT LEG SWELLING: Primary | ICD-10-CM

## 2022-07-01 DIAGNOSIS — R79.89 D-DIMER, ELEVATED: ICD-10-CM

## 2022-07-01 LAB — D DIMER PPP FEU-MCNC: 0.88 MG/L FEU (ref 0–0.49)

## 2022-07-05 RX ORDER — AMLODIPINE BESYLATE AND BENAZEPRIL HYDROCHLORIDE 5; 10 MG/1; MG/1
CAPSULE ORAL
Qty: 90 CAPSULE | Refills: 0 | Status: SHIPPED | OUTPATIENT
Start: 2022-07-05 | End: 2022-10-03

## 2022-07-05 NOTE — TELEPHONE ENCOUNTER
Rx Refill Note  Requested Prescriptions     Pending Prescriptions Disp Refills   • amLODIPine-benazepril (LOTREL 5-10) 5-10 MG per capsule [Pharmacy Med Name: AMLODIPINE-BENAZ 5/10MG CAPSULES] 90 capsule 0     Sig: TAKE 1 CAPSULE BY MOUTH DAILY      Last office visit with prescribing clinician: 6/30/2022      Next office visit with prescribing clinician: Visit date not found            Candido Tillman MA  07/05/22, 07:20 EDT

## 2022-07-06 ENCOUNTER — TELEPHONE (OUTPATIENT)
Dept: FAMILY MEDICINE CLINIC | Facility: CLINIC | Age: 82
End: 2022-07-06

## 2022-07-06 RX ORDER — HYDROCODONE BITARTRATE AND ACETAMINOPHEN 7.5; 325 MG/1; MG/1
1 TABLET ORAL EVERY 6 HOURS PRN
Qty: 12 TABLET | Refills: 0 | Status: SHIPPED | OUTPATIENT
Start: 2022-07-06 | End: 2022-07-08 | Stop reason: SDUPTHER

## 2022-07-06 NOTE — TELEPHONE ENCOUNTER
Caller: Sprigler, Joy C    Relationship: Self    Best call back number: [phone] (M)    PATIENT CALLED COMPLAINING OF SOME LOWER BACK PAIN AND PAIN IN THE BACK OF HER THIGHS.    SHE HAD A CAT SCAN A FEW WEEKS AGO, AND THEY WANT TO GET HER IN FOR AN MRI, SET UP FOR TOMORROW TO DETERMINE IF SHE REALLY DOES HAVE A PINCHED NERVE LOWER LUMBAR.    PATIENT IS WONDERING IF THERE IS ANYWAY TO INCREASE THE MILLIGRAMS ON HER gabapentin (NEURONTIN) 300 MG capsule  OR HAVE SOMETHING DIFFERENT CALLED IN FOR HER PAIN.   SHE IS WONDERING IF THERE IS ANYTHING THAT DR SARABIA CAN SUGGEST FOR HER.    PLEASE ADVISE     The patient reports that the gabapentin really has not started helping yet.  She previously was given some oxycodone at an emergency room in [place].  That did not seem to help.  I am going to give her some hydrocodone and see if that does any better.  The steroids certainly did not seem to help either.

## 2022-07-08 ENCOUNTER — TELEPHONE (OUTPATIENT)
Dept: FAMILY MEDICINE CLINIC | Facility: CLINIC | Age: 82
End: 2022-07-08

## 2022-07-08 RX ORDER — HYDROCODONE BITARTRATE AND ACETAMINOPHEN 7.5; 325 MG/1; MG/1
1 TABLET ORAL EVERY 6 HOURS PRN
Qty: 12 TABLET | Refills: 0 | Status: SHIPPED | OUTPATIENT
Start: 2022-07-08 | End: 2022-07-11 | Stop reason: SDUPTHER

## 2022-07-08 NOTE — TELEPHONE ENCOUNTER
Caller: Sprigler, Joy C    Relationship: Self    Best call back number: [phone]   What test was performed: MRI    When was the test performed: 7/7/22    Where was the test performed: [facility]     Additional notes: PATIENT IS REQUESTING A CALL BACK WITH MRI RESULTS.

## 2022-07-08 NOTE — TELEPHONE ENCOUNTER
Rx Refill Note  Requested Prescriptions     Pending Prescriptions Disp Refills   • HYDROcodone-acetaminophen (NORCO) 7.5-325 MG per tablet 12 tablet 0     Sig: Take 1 tablet by mouth Every 6 (Six) Hours As Needed for Moderate Pain .      Last office visit with prescribing clinician: 6/30/2022      Next office visit with prescribing clinician: 7/8/2022            Candido Tillman MA  07/08/22, 16:14 EDT

## 2022-07-08 NOTE — TELEPHONE ENCOUNTER
Caller: Sprigler, Joy C    Relationship: Self    Best call back number: [phone]       Requested Prescriptions:   Requested Prescriptions     Pending Prescriptions Disp Refills   • HYDROcodone-acetaminophen (NORCO) 7.5-325 MG per tablet 12 tablet 0     Sig: Take 1 tablet by mouth Every 6 (Six) Hours As Needed for Moderate Pain .        Pharmacy where request should be sent: [facility] #[id number] [place] [address] [address] [place] [address] & [facility] - [phone]  - [phone] FX     Additional details provided by patient: PATIENT HAS 4 TABLETS LEFT OF THIS MEDICATION AND STATES THAT IT IS THE ONLY THING HELPING WITH HER PAIN.   Does the patient have less than a 3 day supply:  [x] Yes  [] No    Norma Hope Rep   07/08/22 16:05 EDT

## 2022-07-11 ENCOUNTER — TELEPHONE (OUTPATIENT)
Dept: FAMILY MEDICINE CLINIC | Facility: CLINIC | Age: 82
End: 2022-07-11

## 2022-07-11 RX ORDER — HYDROCODONE BITARTRATE AND ACETAMINOPHEN 7.5; 325 MG/1; MG/1
1 TABLET ORAL EVERY 6 HOURS PRN
Qty: 12 TABLET | Refills: 0 | Status: SHIPPED | OUTPATIENT
Start: 2022-07-11 | End: 2022-07-15 | Stop reason: SDUPTHER

## 2022-07-11 NOTE — TELEPHONE ENCOUNTER
PATIENT CALLED AND STATES HER MEDICATION     HYDROcodone-acetaminophen (NORCO) 7.5-325 MG per tablet    WAS CALLED INTO HER PHARMACY BUT THEY WERE OUT OF IT. SHE IS REQUESTING IT BE CALLED IN TO     [facility] #[id number] [place], [place] - [address] AT [facility] - [phone] [facility] [phone]   [phone]    PHARMACY CLOSES AT 6:00    CALL BACK NUMBER [phone]    SHE HAS BEEN WITHOUT HER MEDICATION ALL WEEKEND

## 2022-07-15 RX ORDER — HYDROCODONE BITARTRATE AND ACETAMINOPHEN 7.5; 325 MG/1; MG/1
1 TABLET ORAL EVERY 6 HOURS PRN
Qty: 30 TABLET | Refills: 0 | Status: SHIPPED | OUTPATIENT
Start: 2022-07-15 | End: 2022-07-25 | Stop reason: SDUPTHER

## 2022-07-15 NOTE — TELEPHONE ENCOUNTER
Caller: Sprigler, Joy C    Relationship: Self    Best call back number: [phone]    Requested Prescriptions:   Requested Prescriptions     Pending Prescriptions Disp Refills   • HYDROcodone-acetaminophen (NORCO) 7.5-325 MG per tablet 12 tablet 0     Sig: Take 1 tablet by mouth Every 6 (Six) Hours As Needed for Moderate Pain .        Pharmacy where request should be sent: [facility] #[id number] [facility] [address] AT [facility] [phone] [facility] [phone]      Additional details provided by patient: PATIENT STATES SHE IS GOING TO RUN OUT OF MEDS TOMORROW AND IS HAVING BAD BACK PAIN. PATIENT STATES SHE HAS NOT HEARD BACK FROM DR THORNTON YET.    Does the patient have less than a 3 day supply:  [x] Yes  [] No    Norma Estevez Rep   07/15/22 14:01 EDT

## 2022-07-15 NOTE — TELEPHONE ENCOUNTER
Rx Refill Note  Requested Prescriptions     Pending Prescriptions Disp Refills   • HYDROcodone-acetaminophen (NORCO) 7.5-325 MG per tablet 12 tablet 0     Sig: Take 1 tablet by mouth Every 6 (Six) Hours As Needed for Moderate Pain .      Last office visit with prescribing clinician: 6/30/2022      Next office visit with prescribing clinician: Visit date not found            Candido Tillman MA  07/15/22, 14:11 EDT

## 2022-07-19 ENCOUNTER — APPOINTMENT (OUTPATIENT)
Dept: MRI IMAGING | Facility: HOSPITAL | Age: 82
End: 2022-07-19

## 2022-07-20 ENCOUNTER — HOSPITAL ENCOUNTER (OUTPATIENT)
Dept: CARDIOLOGY | Facility: HOSPITAL | Age: 82
Discharge: HOME | End: 2022-07-20
Admitting: INTERNAL MEDICINE

## 2022-07-20 DIAGNOSIS — M79.89 LEFT LEG SWELLING: ICD-10-CM

## 2022-07-20 DIAGNOSIS — R79.89 D-DIMER, ELEVATED: ICD-10-CM

## 2022-07-20 LAB
BH CV LOWER VASCULAR LEFT COMMON FEMORAL AUGMENT: NORMAL
BH CV LOWER VASCULAR LEFT COMMON FEMORAL COMPETENT: NORMAL
BH CV LOWER VASCULAR LEFT COMMON FEMORAL COMPRESS: NORMAL
BH CV LOWER VASCULAR LEFT COMMON FEMORAL PHASIC: NORMAL
BH CV LOWER VASCULAR LEFT COMMON FEMORAL SPONT: NORMAL
BH CV LOWER VASCULAR LEFT DISTAL FEMORAL COMPRESS: NORMAL
BH CV LOWER VASCULAR LEFT GASTRONEMIUS COMPRESS: NORMAL
BH CV LOWER VASCULAR LEFT GREATER SAPH AK COMPRESS: NORMAL
BH CV LOWER VASCULAR LEFT GREATER SAPH BK COMPRESS: NORMAL
BH CV LOWER VASCULAR LEFT LESSER SAPH COMPRESS: NORMAL
BH CV LOWER VASCULAR LEFT MID FEMORAL AUGMENT: NORMAL
BH CV LOWER VASCULAR LEFT MID FEMORAL COMPETENT: NORMAL
BH CV LOWER VASCULAR LEFT MID FEMORAL COMPRESS: NORMAL
BH CV LOWER VASCULAR LEFT MID FEMORAL PHASIC: NORMAL
BH CV LOWER VASCULAR LEFT MID FEMORAL SPONT: NORMAL
BH CV LOWER VASCULAR LEFT PERONEAL COMPRESS: NORMAL
BH CV LOWER VASCULAR LEFT POPLITEAL AUGMENT: NORMAL
BH CV LOWER VASCULAR LEFT POPLITEAL COMPETENT: NORMAL
BH CV LOWER VASCULAR LEFT POPLITEAL COMPRESS: NORMAL
BH CV LOWER VASCULAR LEFT POPLITEAL PHASIC: NORMAL
BH CV LOWER VASCULAR LEFT POPLITEAL SPONT: NORMAL
BH CV LOWER VASCULAR LEFT POSTERIOR TIBIAL COMPRESS: NORMAL
BH CV LOWER VASCULAR LEFT PROXIMAL FEMORAL COMPRESS: NORMAL
BH CV LOWER VASCULAR LEFT SAPHENOFEMORAL JUNCTION COMPRESS: NORMAL
BH CV LOWER VASCULAR RIGHT COMMON FEMORAL AUGMENT: NORMAL
BH CV LOWER VASCULAR RIGHT COMMON FEMORAL COMPETENT: NORMAL
BH CV LOWER VASCULAR RIGHT COMMON FEMORAL COMPRESS: NORMAL
BH CV LOWER VASCULAR RIGHT COMMON FEMORAL PHASIC: NORMAL
BH CV LOWER VASCULAR RIGHT COMMON FEMORAL SPONT: NORMAL
MAXIMAL PREDICTED HEART RATE: 137 BPM
STRESS TARGET HR: 116 BPM

## 2022-07-20 PROCEDURE — 93971 EXTREMITY STUDY: CPT

## 2022-07-21 DIAGNOSIS — M51.16 LUMBAR DISC DISEASE WITH RADICULOPATHY: Primary | ICD-10-CM

## 2022-07-21 DIAGNOSIS — M48.56XS COMPRESSION FRACTURE OF LUMBAR SPINE, NON-TRAUMATIC, SEQUELA: ICD-10-CM

## 2022-07-26 ENCOUNTER — TELEPHONE (OUTPATIENT)
Dept: ORTHOPEDIC SURGERY | Facility: CLINIC | Age: 82
End: 2022-07-26

## 2022-07-26 RX ORDER — HYDROCODONE BITARTRATE AND ACETAMINOPHEN 7.5; 325 MG/1; MG/1
1 TABLET ORAL EVERY 6 HOURS PRN
Qty: 30 TABLET | Refills: 0 | Status: SHIPPED | OUTPATIENT
Start: 2022-07-26 | End: 2022-08-08 | Stop reason: SDUPTHER

## 2022-07-26 NOTE — TELEPHONE ENCOUNTER
NEW PATIENT - INTERNAL REFERRAL FROM [facility] FOR Lumbar disc disease with radiculopathy AND Compression fracture of lumbar spine, non-traumatic, sequela - OV 07/23/2022 - CT LUMBAR CE 06/19/2022 - NO KNOWN SX- CAN JGW SEE SOON?

## 2022-07-26 NOTE — TELEPHONE ENCOUNTER
I can see her tomorrow at two,amd make sure the schedule is blocked.  Ask her to bring copy of CD from [facility] if possible and she will need a new lumbar xray tomorrow.

## 2022-07-26 NOTE — TELEPHONE ENCOUNTER
Rx Refill Note  Requested Prescriptions     Pending Prescriptions Disp Refills   • HYDROcodone-acetaminophen (NORCO) 7.5-325 MG per tablet 30 tablet 0     Sig: Take 1 tablet by mouth Every 6 (Six) Hours As Needed for Moderate Pain .      Last office visit with prescribing clinician: 6/30/2022      Next office visit with prescribing clinician: Visit date not found            Ayana Zhong MA  07/26/22, 10:31 EDT

## 2022-07-27 ENCOUNTER — OFFICE VISIT (OUTPATIENT)
Dept: ORTHOPEDIC SURGERY | Facility: CLINIC | Age: 82
End: 2022-07-27

## 2022-07-27 VITALS — WEIGHT: 120 LBS | TEMPERATURE: 97.7 F | HEIGHT: 60 IN | BODY MASS INDEX: 23.56 KG/M2

## 2022-07-27 DIAGNOSIS — S32.040A COMPRESSION FRACTURE OF L4 VERTEBRA, INITIAL ENCOUNTER: ICD-10-CM

## 2022-07-27 DIAGNOSIS — M54.50 LUMBAR SPINE PAIN: Primary | ICD-10-CM

## 2022-07-27 DIAGNOSIS — M48.062 SPINAL STENOSIS OF LUMBAR REGION WITH NEUROGENIC CLAUDICATION: ICD-10-CM

## 2022-07-27 PROCEDURE — 72100 X-RAY EXAM L-S SPINE 2/3 VWS: CPT | Performed by: ORTHOPAEDIC SURGERY

## 2022-07-27 PROCEDURE — 99204 OFFICE O/P NEW MOD 45 MIN: CPT | Performed by: ORTHOPAEDIC SURGERY

## 2022-07-27 NOTE — PROGRESS NOTES
New patient or new problem visit    CC: Low back pain, bilateral buttock pain    HPI: She has back and bilateral buttock pain left worse than right ongoing for about 3 months.  Hydrocodone is helped it started in [place] and is persisted since her return here.  She was taking Percocet in [place] where she was evaluated there and told she has a fracture and pinched nerve.  No bowel or bladder complaints but some imbalance by history.    PFSH: See attached    ROS: As above    PE: BMI 23.4.  Good strength in the legs bilaterally patellar reflexes diminished but symmetrical good sensation.  Straight leg raise is negative.  Mild tenderness to palpation in the lumbar spine.    XRAY: Plain film x-rays lumbar spine demonstrate slight scoliosis and very minimal diminishment of the L4 endplate.  Looks fine otherwise.  No comparison views are available.  Report of [facility] done at [facility] suggested acute or subacute fracture of L 4, 20% collapse, severe stenosis at L3-4 and moderate least severe stenosis at L4-5 by report.  No retropulsion is reported.  I do not have the images.    Other: n/a    Impression: Basically she has a 3-month healed compression fracture at L4 which appears to have compounded some pre-existing spinal stenosis.  She is probably healed at this point so far as the fracture is concerned.    Plan: I want to see her in a month.  Meantime I have ordered epidural steroid injections for pain relief of the radicular symptoms.  Once the fracture is healed we may employ some physical therapy.  I suggested she discontinue narcotic pain medication as soon as possible.  I explained the risk and benefits of the epidurals.  I explained surgery may be needed but I hope to avoid that intervention.  In any event lets heal the fracture first and give it at least another month.

## 2022-08-01 ENCOUNTER — TELEPHONE (OUTPATIENT)
Dept: PAIN MEDICINE | Facility: CLINIC | Age: 82
End: 2022-08-01

## 2022-08-01 ENCOUNTER — TELEPHONE (OUTPATIENT)
Dept: ORTHOPEDIC SURGERY | Facility: CLINIC | Age: 82
End: 2022-08-01

## 2022-08-01 NOTE — TELEPHONE ENCOUNTER
Tell her thanks, I reviewed the MRI personally.  There could be a role for surgery if another month of fracture healing does not result in relief.  I would give it another month however before considering further intervention.  I believe I am due to see her again in a few weeks.

## 2022-08-01 NOTE — TELEPHONE ENCOUNTER
AngelinaSprigler called ,left a Vm asking if we have received her referral from Dr. Reagan stated she is in a lot of pain,please advice.

## 2022-08-02 RX ORDER — MELOXICAM 15 MG/1
15 TABLET ORAL DAILY
Qty: 30 TABLET | Refills: 1 | Status: SHIPPED | OUTPATIENT
Start: 2022-08-02 | End: 2022-09-29

## 2022-08-02 NOTE — TELEPHONE ENCOUNTER
Rx Refill Note  Requested Prescriptions     Pending Prescriptions Disp Refills   • meloxicam (MOBIC) 15 MG tablet [Pharmacy Med Name: MELOXICAM 15MG TABLETS] 30 tablet 1     Sig: TAKE 1 TABLET BY MOUTH DAILY      Last office visit with prescribing clinician: 6/30/2022      Next office visit with prescribing clinician: Visit date not found            Candido Tillman MA  08/02/22, 08:23 EDT

## 2022-08-03 ENCOUNTER — ANESTHESIA (OUTPATIENT)
Dept: PAIN MEDICINE | Facility: HOSPITAL | Age: 82
End: 2022-08-03

## 2022-08-03 ENCOUNTER — HOSPITAL ENCOUNTER (OUTPATIENT)
Dept: GENERAL RADIOLOGY | Facility: HOSPITAL | Age: 82
Discharge: HOME | End: 2022-08-03

## 2022-08-03 ENCOUNTER — ANESTHESIA EVENT (OUTPATIENT)
Dept: PAIN MEDICINE | Facility: HOSPITAL | Age: 82
End: 2022-08-03

## 2022-08-03 ENCOUNTER — HOSPITAL ENCOUNTER (OUTPATIENT)
Dept: PAIN MEDICINE | Facility: HOSPITAL | Age: 82
Discharge: HOME | End: 2022-08-03

## 2022-08-03 VITALS
SYSTOLIC BLOOD PRESSURE: 125 MMHG | HEART RATE: 70 BPM | TEMPERATURE: 96.9 F | DIASTOLIC BLOOD PRESSURE: 96 MMHG | RESPIRATION RATE: 16 BRPM | OXYGEN SATURATION: 98 %

## 2022-08-03 DIAGNOSIS — M48.062 SPINAL STENOSIS OF LUMBAR REGION WITH NEUROGENIC CLAUDICATION: Primary | ICD-10-CM

## 2022-08-03 DIAGNOSIS — R52 PAIN: ICD-10-CM

## 2022-08-03 PROCEDURE — 63710000001 HYDROCODONE-ACETAMINOPHEN 7.5-325 MG TABLET: Performed by: STUDENT IN AN ORGANIZED HEALTH CARE EDUCATION/TRAINING PROGRAM

## 2022-08-03 PROCEDURE — 25010000002 METHYLPREDNISOLONE PER 80 MG: Performed by: STUDENT IN AN ORGANIZED HEALTH CARE EDUCATION/TRAINING PROGRAM

## 2022-08-03 PROCEDURE — 77003 FLUOROGUIDE FOR SPINE INJECT: CPT

## 2022-08-03 PROCEDURE — A9270 NON-COVERED ITEM OR SERVICE: HCPCS | Performed by: STUDENT IN AN ORGANIZED HEALTH CARE EDUCATION/TRAINING PROGRAM

## 2022-08-03 PROCEDURE — 0 IOPAMIDOL 41 % SOLUTION: Performed by: STUDENT IN AN ORGANIZED HEALTH CARE EDUCATION/TRAINING PROGRAM

## 2022-08-03 RX ORDER — LIDOCAINE HYDROCHLORIDE 10 MG/ML
1 INJECTION, SOLUTION INFILTRATION; PERINEURAL ONCE AS NEEDED
Status: DISCONTINUED | OUTPATIENT
Start: 2022-08-03 | End: 2022-08-04 | Stop reason: HOSPADM

## 2022-08-03 RX ORDER — METHYLPREDNISOLONE ACETATE 80 MG/ML
80 INJECTION, SUSPENSION INTRA-ARTICULAR; INTRALESIONAL; INTRAMUSCULAR; SOFT TISSUE ONCE
Status: COMPLETED | OUTPATIENT
Start: 2022-08-03 | End: 2022-08-03

## 2022-08-03 RX ORDER — FENTANYL CITRATE 50 UG/ML
50 INJECTION, SOLUTION INTRAMUSCULAR; INTRAVENOUS AS NEEDED
Status: DISCONTINUED | OUTPATIENT
Start: 2022-08-03 | End: 2022-08-04 | Stop reason: HOSPADM

## 2022-08-03 RX ORDER — SODIUM CHLORIDE 0.9 % (FLUSH) 0.9 %
1-10 SYRINGE (ML) INJECTION AS NEEDED
Status: DISCONTINUED | OUTPATIENT
Start: 2022-08-03 | End: 2022-08-04 | Stop reason: HOSPADM

## 2022-08-03 RX ORDER — MIDAZOLAM HYDROCHLORIDE 1 MG/ML
1 INJECTION INTRAMUSCULAR; INTRAVENOUS AS NEEDED
Status: DISCONTINUED | OUTPATIENT
Start: 2022-08-03 | End: 2022-08-04 | Stop reason: HOSPADM

## 2022-08-03 RX ORDER — HYDROCODONE BITARTRATE AND ACETAMINOPHEN 5; 325 MG/1; MG/1
1 TABLET ORAL EVERY 6 HOURS PRN
Status: DISCONTINUED | OUTPATIENT
Start: 2022-08-03 | End: 2022-08-04 | Stop reason: HOSPADM

## 2022-08-03 RX ORDER — HYDROCODONE BITARTRATE AND ACETAMINOPHEN 7.5; 325 MG/1; MG/1
1 TABLET ORAL EVERY 6 HOURS PRN
Status: DISCONTINUED | OUTPATIENT
Start: 2022-08-03 | End: 2022-08-04 | Stop reason: HOSPADM

## 2022-08-03 RX ADMIN — METHYLPREDNISOLONE ACETATE 80 MG: 80 INJECTION, SUSPENSION INTRA-ARTICULAR; INTRALESIONAL; INTRAMUSCULAR; SOFT TISSUE at 10:17

## 2022-08-03 RX ADMIN — IOPAMIDOL 10 ML: 408 INJECTION, SOLUTION INTRATHECAL at 10:16

## 2022-08-03 RX ADMIN — HYDROCODONE BITARTRATE AND ACETAMINOPHEN 1 TABLET: 7.5; 325 TABLET ORAL at 10:41

## 2022-08-03 NOTE — H&P
CHIEF COMPLAINT: Back pain        HISTORY OF PRESENT ILLNESS:  This patient is complaining of back pain which radiates down both buttocks and hips.  It is a 8-10 out of 10 on the pain scale.  The pain is described as severe in nature. The pain is exacerbated by walking and standing, and relieved by sitting, heat.  The patient has tried conservative therapy including: heat, OTC medications, prescription medications and steroids.      Diagnostic imaging: CT lumbar spine      This patient was referred to our clinic by Dr. Reagan for LESI.      PAST MEDICAL HISTORY:  Current Outpatient Medications on File Prior to Encounter   Medication Sig Dispense Refill   • acetaminophen (TYLENOL) 325 MG tablet Take 2 tablets by mouth Every 4 (Four) Hours As Needed for Mild Pain . 30 tablet 0   • amLODIPine-benazepril (LOTREL 5-10) 5-10 MG per capsule TAKE 1 CAPSULE BY MOUTH DAILY 90 capsule 0   • clotrimazole-betamethasone (Lotrisone) 1-0.05 % cream Apply  topically to the appropriate area as directed Every 12 (Twelve) Hours. 15 g 0   • ESTRACE VAGINAL 0.1 MG/GM vaginal cream U UTD QHS FOR 2 WEEKS THEN U THREE TIMES WEEKLY QHS  3   • gabapentin (NEURONTIN) 300 MG capsule Take 1 capsule by mouth 3 (Three) Times a Day. 90 capsule 2   • HYDROcodone-acetaminophen (NORCO) 7.5-325 MG per tablet Take 1 tablet by mouth Every 6 (Six) Hours As Needed for Moderate Pain . 30 tablet 0   • meloxicam (MOBIC) 15 MG tablet TAKE 1 TABLET BY MOUTH DAILY 30 tablet 1   • Multiple Vitamin (MULTI VITAMIN DAILY PO) Take  by mouth.       No current facility-administered medications on file prior to encounter.       Past Medical History:   Diagnosis Date   • Female bladder prolapse    • Hyperlipemia     Pt denies   • Hypertension        PAST SURGICAL HISTORY  Past Surgical History:   Procedure Laterality Date   • CARPAL TUNNEL RELEASE     • COLONOSCOPY N/A 12/2/2016    Procedure: COLONOSCOPY TO CECUM/TI;  Surgeon: Anthony De MD;  Location: [facility]  ENDOSCOPY;  Service:    • CYSTOSCOPY W/ URETERAL STENT PLACEMENT Right 11/28/2020    Procedure: RIGHT URETERAL STONE EXTRACTION WITH LITHOPAXY OF STONE AND STENT PLACEMENT;  Surgeon: Enrique Youssef MD;  Location: [facility] OR;  Service: Urology;  Laterality: Right;   • ENDOSCOPY N/A 12/2/2016    Procedure: ESOPHAGOGASTRODUODENOSCOPY with biopsy;  Surgeon: Anthony De MD;  Location: [facility] ENDOSCOPY;  Service:    • NO PAST SURGERIES           REVIEW OF SYSTEMS:  No pertinent hematologic, infectious, or constitutional symptoms.  Other review of systems non-contributory     PHYSICAL EXAM:  /66 (BP Location: Left arm, Patient Position: Sitting)   Pulse 73   Temp 36.1 °C (96.9 °F) (Infrared)   Resp 16   SpO2 98%   Constitutional: Well-developed well-nourished no acute distress  General: Alert and oriented      DIAGNOSIS:  Post-Op Diagnosis Codes:  Post-Op Diagnosis Codes:     * Spinal stenosis of lumbar region with neurogenic claudication [M48.062]    PLAN:  -  Lumbar epidural steroid injection .  If pain control is acceptable after this injection, it was discussed with the patient that they may return for the subsequent injections if their pain returns.    - Risks/benefits/alternatives of procedure were discussed with patient. The patient voiced understanding and agreed to proceed.   -  Physical therapy exercises recommended as prescribed by physical therapist or from the pain clinic handout.  -  It is recommended that the patient minimize opioid medication use and instead use alternatives such as NSAID's and Tylenol assuming no contraindications.     -  Heat and ice to the affected area as tolerated for pain control.    -  Daily low impact exercise such as walking/water exercise recommended to maintain overall health and aid in weight control.   -  Follow up as needed for subsequent injections.      Jonathan Villanueva MD  Pain Management, Anesthesiology

## 2022-08-03 NOTE — ANESTHESIA PROCEDURE NOTES
PAIN Epidural block      Patient reassessed immediately prior to procedure    Patient location during procedure: pain clinic  Start Time: 8/3/2022 10:12 AM  Stop Time: 8/3/2022 10:17 AM  Indication:at surgeon's request and procedure for pain  Performed By  Anesthesiologist: Jonathan Villanueva MD  Preanesthetic Checklist  Completed: patient identified, IV checked, site marked, risks and benefits discussed, surgical consent, monitors and equipment checked, pre-op evaluation and timeout performed  Prep:  Pt Position:prone  Sterile Tech:cap, gloves, mask and sterile barrier  Prep:chlorhexidine gluconate and isopropyl alcohol  Monitoring:blood pressure monitoring, continuous pulse oximetry and EKG  Procedure:Sedation: no     Approach:midline  Guidance: fluoroscopy  Location:lumbar  Level:4-5  Needle Type:Tuohy  Needle Gauge:20 G  Aspiration:negative  Medications:  Preservative Free Saline:3mL  Isovue:2mL  Depomedrol:80mg  Post Assessment:  Pt Tolerance:patient tolerated the procedure well with no apparent complications  Complications:no

## 2022-08-08 RX ORDER — HYDROCODONE BITARTRATE AND ACETAMINOPHEN 7.5; 325 MG/1; MG/1
1 TABLET ORAL EVERY 6 HOURS PRN
Qty: 30 TABLET | Refills: 0 | Status: SHIPPED | OUTPATIENT
Start: 2022-08-08 | End: 2022-08-29 | Stop reason: SDUPTHER

## 2022-08-08 NOTE — TELEPHONE ENCOUNTER
Caller: Sprigler, Joy C    Relationship: Self    Best call back number: [phone]    Requested Prescriptions:   Requested Prescriptions     Pending Prescriptions Disp Refills   • HYDROcodone-acetaminophen (NORCO) 7.5-325 MG per tablet 30 tablet 0     Sig: Take 1 tablet by mouth Every 6 (Six) Hours As Needed for Moderate Pain .        Pharmacy where request should be sent: [id number] [place] [address] [place] [address] [address] [facility] - [phone] [facility] [phone] FX     Additional details provided by patient: SHE WOULD ALSO LIKE DR. SARABIA TO CALL HER TO DISCUSS HOW MUCH OF THE PAIN MEDICATION SHE SHOULD BE TAKING NOW THAT SHE HAD THE INJECTION IN HER BACK.      Does the patient have less than a 3 day supply:  [x] Yes  [] No    Norma Beaulieu Rep   08/08/22 14:26 EDT

## 2022-08-08 NOTE — TELEPHONE ENCOUNTER
Rx Refill Note  Requested Prescriptions     Pending Prescriptions Disp Refills   • HYDROcodone-acetaminophen (NORCO) 7.5-325 MG per tablet 30 tablet 0     Sig: Take 1 tablet by mouth Every 6 (Six) Hours As Needed for Moderate Pain .      Last office visit with prescribing clinician: 6/30/2022      Next office visit with prescribing clinician: Visit date not found            Candido Tillman MA  08/08/22, 14:45 EDT

## 2022-08-09 ENCOUNTER — TRANSCRIBE ORDERS (OUTPATIENT)
Dept: ADMINISTRATIVE | Facility: HOSPITAL | Age: 82
End: 2022-08-09

## 2022-08-09 DIAGNOSIS — Z12.31 SCREENING MAMMOGRAM, ENCOUNTER FOR: Primary | ICD-10-CM

## 2022-08-10 ENCOUNTER — TELEPHONE (OUTPATIENT)
Dept: FAMILY MEDICINE CLINIC | Facility: CLINIC | Age: 82
End: 2022-08-10

## 2022-08-10 NOTE — TELEPHONE ENCOUNTER
Pt called confused on if she is supposed to be taking the Meloxicam since it was just refilled. Also would really love to talk to you personally about another question she has, didn't fully explain problem when asked.       Advised ok to take meloxicam and gabapentin. Ok to take hydrocodone.

## 2022-08-29 RX ORDER — HYDROCODONE BITARTRATE AND ACETAMINOPHEN 7.5; 325 MG/1; MG/1
1 TABLET ORAL EVERY 6 HOURS PRN
Qty: 60 TABLET | Refills: 0 | Status: SHIPPED | OUTPATIENT
Start: 2022-08-29 | End: 2022-09-22 | Stop reason: SDUPTHER

## 2022-08-30 ENCOUNTER — OFFICE VISIT (OUTPATIENT)
Dept: ORTHOPEDIC SURGERY | Facility: CLINIC | Age: 82
End: 2022-08-30

## 2022-08-30 VITALS — TEMPERATURE: 97.4 F | HEIGHT: 60 IN | WEIGHT: 121 LBS | BODY MASS INDEX: 23.75 KG/M2

## 2022-08-30 DIAGNOSIS — M43.16 SPONDYLOLISTHESIS OF LUMBAR REGION: ICD-10-CM

## 2022-08-30 DIAGNOSIS — M54.50 LUMBAR SPINE PAIN: Primary | ICD-10-CM

## 2022-08-30 DIAGNOSIS — M48.062 SPINAL STENOSIS OF LUMBAR REGION WITH NEUROGENIC CLAUDICATION: ICD-10-CM

## 2022-08-30 PROCEDURE — 72100 X-RAY EXAM L-S SPINE 2/3 VWS: CPT | Performed by: ORTHOPAEDIC SURGERY

## 2022-08-30 PROCEDURE — 99215 OFFICE O/P EST HI 40 MIN: CPT | Performed by: ORTHOPAEDIC SURGERY

## 2022-08-30 RX ORDER — METHYLPREDNISOLONE 4 MG/1
TABLET ORAL
Qty: 21 TABLET | Refills: 0 | Status: SHIPPED | OUTPATIENT
Start: 2022-08-30 | End: 2022-09-20

## 2022-08-30 NOTE — PROGRESS NOTES
She is better but still having back and leg pain of significance.  She is here with her daughter and we had a detailed discussion.  Leg pain radiates into the buttocks bilaterally and down into the left lower extremity.  No bowel or bladder complaints.  Good strength on examination today.  Her posture looks good and her L4 fracture appears healed on plain film x-rays compared to prior films.  She has a spondylolisthesis at 4 5 and L5-S1.  MRI showed severe stenosis at 3 4.  I think she could be a candidate for L3-4 laminectomy to which I might consider adding in situ fusion but no hardware.  We could keep this a pretty small operation on the order of 90 minutes or so.  I do think that if she stays healthy it is inevitable that she will want to have the stenosis treated.  There is some risk of neurologic deterioration meantime but mostly she seems very active and unready to give up that state.  I explained the risk and benefits of an L3-4 laminectomy plus or minus in situ fusion.  I discussed the risks and benefits of laminectomy.  Risks include adverse anesthetic events including death, stroke and myocardial infarction.  More specific surgical complications include infection, nerve root injury and/or paralysis, persistent pain, recurrent pain, spinal fluid leakage, painful scarring, and increased back pain and/or instability, among others. There is a 70 to 90 percent chance of success.   Alternatives were discussed.  She is going to [place] next week and I have prescribed a Medrol Dosepak and she can think about this.  I told her that if I have about 12 weeks before halfway that I can operate on her, otherwise she can follow-up with Dr. Wander Wolf..

## 2022-08-31 ENCOUNTER — TELEPHONE (OUTPATIENT)
Dept: FAMILY MEDICINE CLINIC | Facility: CLINIC | Age: 82
End: 2022-08-31

## 2022-08-31 NOTE — TELEPHONE ENCOUNTER
Caller: Sprigler, Joy C    Relationship: Self    Best call back number:[phone]    What is the best time to reach you: ANYTIME  Who are you requesting to speak with (clinical staff, provider,  specific staff member): CLINICAL STAFF    Do you know the name of the person who called: SELF   What was the call regarding: PATIENT CALLED AND REALLY NEEDS TO TALK TO YOU TODAY ABOUT HER BACK PROBLEM.   Do you require a callback: YES     We discussed going on proceeding with surgery through Dr. Reagan

## 2022-09-01 ENCOUNTER — PREP FOR SURGERY (OUTPATIENT)
Dept: OTHER | Facility: HOSPITAL | Age: 82
End: 2022-09-01

## 2022-09-01 ENCOUNTER — TELEPHONE (OUTPATIENT)
Dept: ORTHOPEDIC SURGERY | Facility: CLINIC | Age: 82
End: 2022-09-01

## 2022-09-01 DIAGNOSIS — M48.062 SPINAL STENOSIS OF LUMBAR REGION WITH NEUROGENIC CLAUDICATION: Primary | ICD-10-CM

## 2022-09-01 RX ORDER — CEFAZOLIN SODIUM 2 G/100ML
2 INJECTION, SOLUTION INTRAVENOUS ONCE
Status: CANCELLED | OUTPATIENT
Start: 2022-09-22 | End: 2022-09-01

## 2022-09-01 NOTE — TELEPHONE ENCOUNTER
Caller: Sprigler, Joy C    Relationship to patient: Self    Best call back number: [phone]    Patient is needing: PATIENT IS WANTING TO SCHEDULE SURGERY WITH . PLEASE GIVE HER A CALL BACK AT THE NUMBER ABOVE.

## 2022-09-01 NOTE — TELEPHONE ENCOUNTER
Please schedule L3-4 laminectomy and possible in situ fusion (no instrumentation) for late September.  Thursday is fine.

## 2022-09-01 NOTE — TELEPHONE ENCOUNTER
See note from patient.  She wants to schedule surgery.  Please enter case request or let me know what the procedure is and I can enter it

## 2022-09-01 NOTE — TELEPHONE ENCOUNTER
Call returned to the patient.  Have scheduled her for an L3-4 laminectomy and in situ fusion without instrumentation for September 22 per SOHAN CUEVAS.  Information has been emailed and mailed to the patient at her request

## 2022-09-06 ENCOUNTER — TELEPHONE (OUTPATIENT)
Dept: ORTHOPEDIC SURGERY | Facility: CLINIC | Age: 82
End: 2022-09-06

## 2022-09-06 NOTE — TELEPHONE ENCOUNTER
Caller: Sprigler, Joy C    Relationship: Self    Best call back number: [phone]    What is the best time to reach you: ANY    What was the call regarding:   PT HAS SURGERY SCHEDULED FOR 9.22.2022 AND HAS QUESTIONS:     -THE INSTRUCTIONS SUGGEST THAT THE PT GET A BACK BRACE PRIOR TO SURGERY AND BRING TO HOSPITAL. THEN IT STATES THAT MEDICARE WILL NOT PAY FOR IT BEFORE SURGERY. WHAT IS THE PROTOCOL?    -HOW LONG SHOULD SHE EXPECT TO BE IN THE HOSPITAL?    -WHAT KIND OF CARE WILL SHE NEED WHEN SHE GETS HOME?    Do you require a callback: YES

## 2022-09-06 NOTE — TELEPHONE ENCOUNTER
Call returned to the patient.  Have notified her that Medicare will no longer allow us to fit their braces preoperatively.  Will plan to fit it postoperatively.  We discussed that she will be in the hospital anywhere from 2 to 3 days after surgery.  In order for her to go home she will need to have somebody that stays with her for the first week or 2.  You will need to be able to get in and out of bed by herself or with minimal assistance and be able to ambulate short distances with her walker safely before considering going home.  If she struggles with any of those or she cannot make arrangements to have anybody help her at home then would recommend that she go to rehab

## 2022-09-14 ENCOUNTER — TELEPHONE (OUTPATIENT)
Dept: FAMILY MEDICINE CLINIC | Facility: CLINIC | Age: 82
End: 2022-09-14

## 2022-09-14 NOTE — TELEPHONE ENCOUNTER
Hub please inform patient called pt no answer left vm to call us back to schedule some times this year

## 2022-09-20 ENCOUNTER — TELEPHONE (OUTPATIENT)
Dept: ORTHOPEDIC SURGERY | Facility: CLINIC | Age: 82
End: 2022-09-20

## 2022-09-20 ENCOUNTER — PRE-ADMISSION TESTING (OUTPATIENT)
Dept: PREADMISSION TESTING | Facility: HOSPITAL | Age: 82
End: 2022-09-20

## 2022-09-20 VITALS
SYSTOLIC BLOOD PRESSURE: 120 MMHG | BODY MASS INDEX: 27.13 KG/M2 | DIASTOLIC BLOOD PRESSURE: 71 MMHG | WEIGHT: 120.6 LBS | HEART RATE: 81 BPM | OXYGEN SATURATION: 97 % | TEMPERATURE: 98.5 F | HEIGHT: 56 IN | RESPIRATION RATE: 18 BRPM

## 2022-09-20 LAB
ANION GAP SERPL CALCULATED.3IONS-SCNC: 7.7 MMOL/L (ref 5–15)
BUN SERPL-MCNC: 13 MG/DL (ref 8–23)
BUN/CREAT SERPL: 19.1 (ref 7–25)
CALCIUM SPEC-SCNC: 9.6 MG/DL (ref 8.6–10.5)
CHLORIDE SERPL-SCNC: 101 MMOL/L (ref 98–107)
CO2 SERPL-SCNC: 29.3 MMOL/L (ref 22–29)
CREAT SERPL-MCNC: 0.68 MG/DL (ref 0.57–1)
DEPRECATED RDW RBC AUTO: 41.2 FL (ref 37–54)
EGFRCR SERPLBLD CKD-EPI 2021: 86.5 ML/MIN/1.73
ERYTHROCYTE [DISTWIDTH] IN BLOOD BY AUTOMATED COUNT: 13 % (ref 12.3–15.4)
GLUCOSE SERPL-MCNC: 90 MG/DL (ref 65–99)
HCT VFR BLD AUTO: 40.9 % (ref 34–46.6)
HGB BLD-MCNC: 13.4 G/DL (ref 12–15.9)
MCH RBC QN AUTO: 28.4 PG (ref 26.6–33)
MCHC RBC AUTO-ENTMCNC: 32.8 G/DL (ref 31.5–35.7)
MCV RBC AUTO: 86.7 FL (ref 79–97)
PLATELET # BLD AUTO: 271 10*3/MM3 (ref 140–450)
PMV BLD AUTO: 10.2 FL (ref 6–12)
POTASSIUM SERPL-SCNC: 4 MMOL/L (ref 3.5–5.2)
QT INTERVAL: 374 MS
RBC # BLD AUTO: 4.72 10*6/MM3 (ref 3.77–5.28)
SODIUM SERPL-SCNC: 138 MMOL/L (ref 136–145)
WBC NRBC COR # BLD: 7.84 10*3/MM3 (ref 3.4–10.8)

## 2022-09-20 PROCEDURE — 80048 BASIC METABOLIC PNL TOTAL CA: CPT

## 2022-09-20 PROCEDURE — 36415 COLL VENOUS BLD VENIPUNCTURE: CPT

## 2022-09-20 PROCEDURE — 93005 ELECTROCARDIOGRAM TRACING: CPT

## 2022-09-20 PROCEDURE — 93010 ELECTROCARDIOGRAM REPORT: CPT | Performed by: INTERNAL MEDICINE

## 2022-09-20 PROCEDURE — 85027 COMPLETE CBC AUTOMATED: CPT

## 2022-09-20 NOTE — DISCHARGE INSTRUCTIONS
Take the following medications the morning of surgery:  GABAPENTIN    ARRIVE FOR SURGERY AT 8:30 AM      If you are on prescription narcotic pain medication to control your pain you may also take that medication the morning of surgery.    General Instructions:  Do not eat solid food after midnight the night before surgery.  You may drink clear liquids day of surgery but must stop at least one hour before your hospital arrival time.  It is beneficial for you to have a clear drink that contains carbohydrates the day of surgery.  We suggest a 12 to 20 ounce bottle of Gatorade or Powerade for non-diabetic patients or a 12 to 20 ounce bottle of G2 or Powerade Zero for diabetic patients. (Pediatric patients, are not advised to drink a 12 to 20 ounce carbohydrate drink)    Clear liquids are liquids you can see through.  Nothing red in color.     Plain water                               Sports drinks  Sodas                                   Gelatin (Jell-O)  Fruit juices without pulp such as white grape juice and apple juice  Popsicles that contain no fruit or yogurt  Tea or coffee (no cream or milk added)  Gatorade / Powerade  G2 / Powerade Zero    Infants may have breast milk up to four hours before surgery.  Infants drinking formula may drink formula up to six hours before surgery.   Patients who avoid smoking, chewing tobacco and alcohol for 4 weeks prior to surgery have a reduced risk of post-operative complications.  Quit smoking as many days before surgery as you can.  Do not smoke, use chewing tobacco or drink alcohol the day of surgery.   If applicable bring your C-PAP/ BI-PAP machine.  Bring any papers given to you in the doctor’s office.  Wear clean comfortable clothes.  Do not wear contact lenses, false eyelashes or make-up.  Bring a case for your glasses.   Bring crutches or walker if applicable.  Remove all piercings.  Leave jewelry and any other valuables at home.  Hair extensions with metal clips must be  removed prior to surgery.  The Pre-Admission Testing nurse will instruct you to bring medications if unable to obtain an accurate list in Pre-Admission Testing.        If you were given a blood bank ID arm band remember to bring it with you the day of surgery.    Preventing a Surgical Site Infection:  For 2 to 3 days before surgery, avoid shaving with a razor because the razor can irritate skin and make it easier to develop an infection.    Any areas of open skin can increase the risk of a post-operative wound infection by allowing bacteria to enter and travel throughout the body.  Notify your surgeon if you have any skin wounds / rashes even if it is not near the expected surgical site.  The area will need assessed to determine if surgery should be delayed until it is healed.  The night prior to surgery shower using a fresh bar of anti-bacterial soap (such as Dial) and clean washcloth.  Sleep in a clean bed with clean clothing.  Do not allow pets to sleep with you.  Shower on the morning of surgery using a fresh bar of anti-bacterial soap (such as Dial) and clean washcloth.  Dry with a clean towel and dress in clean clothing.  Ask your surgeon if you will be receiving antibiotics prior to surgery.  Make sure you, your family, and all healthcare providers clean their hands with soap and water or an alcohol based hand  before caring for you or your wound.    Day of surgery:  Your arrival time is approximately two hours before your scheduled surgery time.  Upon arrival, a Pre-op nurse and Anesthesiologist will review your health history, obtain vital signs, and answer questions you may have.  The only belongings needed at this time will be a list of your home medications and if applicable your C-PAP/BI-PAP machine.  A Pre-op nurse will start an IV and you may receive medication in preparation for surgery, including something to help you relax.     Please be aware that surgery does come with discomfort.  We  want to make every effort to control your discomfort so please discuss any uncontrolled symptoms with your nurse.   Your doctor will most likely have prescribed pain medications.      If you are going home after surgery you will receive individualized written care instructions before being discharged.  A responsible adult must drive you to and from the hospital on the day of your surgery and stay with you for 24 hours.  Discharge prescriptions can be filled by the hospital pharmacy during regular pharmacy hours.  If you are having surgery late in the day/evening your prescription may be e-prescribed to your pharmacy.  Please verify your pharmacy hours or chose a 24 hour pharmacy to avoid not having access to your prescription because your pharmacy has closed for the day.    If you are staying overnight following surgery, you will be transported to your hospital room following the recovery period.  [facility] has all private rooms.    If you have any questions please call Pre-Admission Testing at [phone].  Deductibles and co-payments are collected on the day of service. Please be prepared to pay the required co-pay, deductible or deposit on the day of service as defined by your plan.    Call your surgeon immediately if you experience any of the following symptoms:  Sore Throat  Shortness of Breath or difficulty breathing  Cough  Chills  Body soreness or muscle pain  Headache  Fever  New loss of taste or smell  Do not arrive for your surgery ill.  Your procedure will need to be rescheduled to another time.  You will need to call your physician before the day of surgery to avoid any unnecessary exposure to hospital staff as well as other patients.

## 2022-09-20 NOTE — TELEPHONE ENCOUNTER
Call returned to the patient.  Over the last couple of days she has noticed some hives on her abdomen and low back.  She has 2 or 3 places on her low mid back that are about the size of a nickel or quarter.  She thinks the hives are from the hydrocodone.  Advised her to discontinue the hydrocodone.  She can take Benadryl for the itching.  Would recommend that she get some hydrocortisone cream over-the-counter and apply to the areas on her back.  We discussed that Dr. Reagan would have to look at the wounds on the morning of surgery to see if they do interfere with where he is planning to make her incision.  I explained to her that if the hives are in the location of the incision and then its possible her surgery would have to be postponed due to the increased risk of infection.  Patient understands.  Will check with SOHAN CUEVAS about getting her something else for pain

## 2022-09-20 NOTE — TELEPHONE ENCOUNTER
Patient calling to say that she is having an allergic reaction , she thinks to hydrocodone.  She needs advice and is worried it will affect upcoming surgery.

## 2022-09-21 ENCOUNTER — APPOINTMENT (OUTPATIENT)
Dept: MAMMOGRAPHY | Facility: HOSPITAL | Age: 82
End: 2022-09-21

## 2022-09-22 ENCOUNTER — HOSPITAL ENCOUNTER (OUTPATIENT)
Facility: HOSPITAL | Age: 82
Discharge: HOME | End: 2022-09-22
Attending: ORTHOPAEDIC SURGERY | Admitting: ORTHOPAEDIC SURGERY

## 2022-09-22 ENCOUNTER — ANESTHESIA (OUTPATIENT)
Dept: PERIOP | Facility: HOSPITAL | Age: 82
End: 2022-09-22

## 2022-09-22 ENCOUNTER — ANESTHESIA EVENT (OUTPATIENT)
Dept: PERIOP | Facility: HOSPITAL | Age: 82
End: 2022-09-22

## 2022-09-22 ENCOUNTER — TELEPHONE (OUTPATIENT)
Dept: FAMILY MEDICINE CLINIC | Facility: CLINIC | Age: 82
End: 2022-09-22

## 2022-09-22 ENCOUNTER — OFFICE VISIT (OUTPATIENT)
Dept: FAMILY MEDICINE CLINIC | Facility: CLINIC | Age: 82
End: 2022-09-22

## 2022-09-22 VITALS
DIASTOLIC BLOOD PRESSURE: 68 MMHG | OXYGEN SATURATION: 97 % | WEIGHT: 121 LBS | HEIGHT: 56 IN | SYSTOLIC BLOOD PRESSURE: 114 MMHG | BODY MASS INDEX: 27.22 KG/M2 | HEART RATE: 89 BPM

## 2022-09-22 DIAGNOSIS — M48.062 SPINAL STENOSIS OF LUMBAR REGION WITH NEUROGENIC CLAUDICATION: ICD-10-CM

## 2022-09-22 DIAGNOSIS — B02.9 HERPES ZOSTER WITHOUT COMPLICATION: Primary | ICD-10-CM

## 2022-09-22 PROCEDURE — 99214 OFFICE O/P EST MOD 30 MIN: CPT | Performed by: INTERNAL MEDICINE

## 2022-09-22 PROCEDURE — G0378 HOSPITAL OBSERVATION PER HR: HCPCS

## 2022-09-22 PROCEDURE — G0463 HOSPITAL OUTPT CLINIC VISIT: HCPCS | Performed by: ORTHOPAEDIC SURGERY

## 2022-09-22 RX ORDER — FENTANYL CITRATE 50 UG/ML
50 INJECTION, SOLUTION INTRAMUSCULAR; INTRAVENOUS
Status: DISCONTINUED | OUTPATIENT
Start: 2022-09-22 | End: 2022-09-22 | Stop reason: HOSPADM

## 2022-09-22 RX ORDER — FAMOTIDINE 10 MG/ML
20 INJECTION, SOLUTION INTRAVENOUS ONCE
Status: DISCONTINUED | OUTPATIENT
Start: 2022-09-22 | End: 2022-09-22 | Stop reason: HOSPADM

## 2022-09-22 RX ORDER — SODIUM CHLORIDE, SODIUM LACTATE, POTASSIUM CHLORIDE, CALCIUM CHLORIDE 600; 310; 30; 20 MG/100ML; MG/100ML; MG/100ML; MG/100ML
9 INJECTION, SOLUTION INTRAVENOUS CONTINUOUS
Status: DISCONTINUED | OUTPATIENT
Start: 2022-09-22 | End: 2022-09-22 | Stop reason: HOSPADM

## 2022-09-22 RX ORDER — LIDOCAINE HYDROCHLORIDE 10 MG/ML
0.5 INJECTION, SOLUTION EPIDURAL; INFILTRATION; INTRACAUDAL; PERINEURAL ONCE AS NEEDED
Status: DISCONTINUED | OUTPATIENT
Start: 2022-09-22 | End: 2022-09-22 | Stop reason: HOSPADM

## 2022-09-22 RX ORDER — VALACYCLOVIR HYDROCHLORIDE 1 G/1
1000 TABLET, FILM COATED ORAL 2 TIMES DAILY
Qty: 14 TABLET | Refills: 0 | Status: SHIPPED | OUTPATIENT
Start: 2022-09-22 | End: 2022-11-15

## 2022-09-22 RX ORDER — HYDROCODONE BITARTRATE AND ACETAMINOPHEN 7.5; 325 MG/1; MG/1
1 TABLET ORAL EVERY 6 HOURS PRN
Qty: 60 TABLET | Refills: 0 | Status: SHIPPED | OUTPATIENT
Start: 2022-09-22 | End: 2022-10-28 | Stop reason: SDUPTHER

## 2022-09-22 RX ORDER — SODIUM CHLORIDE 0.9 % (FLUSH) 0.9 %
3 SYRINGE (ML) INJECTION EVERY 12 HOURS SCHEDULED
Status: DISCONTINUED | OUTPATIENT
Start: 2022-09-22 | End: 2022-09-22 | Stop reason: HOSPADM

## 2022-09-22 RX ORDER — MIDAZOLAM HYDROCHLORIDE 1 MG/ML
0.5 INJECTION INTRAMUSCULAR; INTRAVENOUS
Status: DISCONTINUED | OUTPATIENT
Start: 2022-09-22 | End: 2022-09-22 | Stop reason: HOSPADM

## 2022-09-22 RX ORDER — CEFAZOLIN SODIUM 2 G/100ML
2 INJECTION, SOLUTION INTRAVENOUS ONCE
Status: DISCONTINUED | OUTPATIENT
Start: 2022-09-22 | End: 2022-09-22 | Stop reason: HOSPADM

## 2022-09-22 RX ORDER — SODIUM CHLORIDE 0.9 % (FLUSH) 0.9 %
3-10 SYRINGE (ML) INJECTION AS NEEDED
Status: DISCONTINUED | OUTPATIENT
Start: 2022-09-22 | End: 2022-09-22 | Stop reason: HOSPADM

## 2022-09-22 NOTE — ANESTHESIA PREPROCEDURE EVALUATION
Anesthesia Evaluation     Patient summary reviewed and Nursing notes reviewed                Airway   Mallampati: II  TM distance: >3 FB  Neck ROM: full  Dental      Pulmonary - negative pulmonary ROS   Cardiovascular     ECG reviewed  Rhythm: regular  Rate: normal    (+) hypertension, hyperlipidemia,       Neuro/Psych- negative ROS  GI/Hepatic/Renal/Endo    (+)   renal disease stones,     Musculoskeletal     Abdominal    Substance History - negative use     OB/GYN negative ob/gyn ROS         Other   arthritis,                      Anesthesia Plan    ASA 2     general     (Hgb 13.4    I have reviewed the patient's history with the patient and the chart, including all pertinent laboratory results and imaging. I have explained the risks of anesthesia including but not limited to dental damage, corneal abrasion, nerve injury, MI, stroke, and death. Questions asked and answered. Anesthetic plan discussed with patient and team as indicated. Patient expressed understanding of the above.  )  intravenous induction     Anesthetic plan, risks, benefits, and alternatives have been provided, discussed and informed consent has been obtained with: patient.        CODE STATUS:

## 2022-09-22 NOTE — TELEPHONE ENCOUNTER
Caller: KRISTIAN SEGUNDO    Relationship to patient: Brother/Sister    Best call back number:[phone]    Patient is needing: PATIENT'S SISTER CALLING BACK FOR AN UPDATE.SHE IS WANTING DR SARABIA TO DO [place] MOLINA REQUEST IS FROM DR BOWDEN.

## 2022-09-22 NOTE — TELEPHONE ENCOUNTER
Caller: KRISTIAN SEGUNDO    Relationship to patient: Brother/Sister    Best call back number: [phone]    Patient is needing: PATIENT'S SISTER CALLED AND SAID THAT THE PATIENT WAS SUPPOSED TO HAVE SURGERY TODAY WITH DR. KENNA BOWDEN BUT IT WAS CANCELLED DUE TO THE PATIENT HAVING A RASH ON HER BACK    HER SISTER SAID THAT THEY THINK SHE HAS SHINGLES AND THAT DR. BOWDEN ADVISED SHE SEE PRIMARY CARE AS SOON AS POSSIBLE     HUB ADVISE THE NEXT AVAILABLE OPENING IS NEXT Wednesday. PATIENT'S SISTER IS WANTING TO SEE WHAT DR. SARABIA WANTS TO DO

## 2022-09-22 NOTE — PROGRESS NOTES
Subjective Chief complaint is a rash  Joy C Sprigler is a 81 y.o. female.     History of Present Illness Madiha is here today for complaints of a rash.  She was due to have surgery and it got canceled because of possible shingles.  She is not having pain but it is itching.  There are groups of blisters on her back and then on her left flank and abdomen.    The following portions of the patient's history were reviewed and updated as appropriate: allergies, current medications, past family history, past medical history, past social history, past surgical history and problem list.    Review of Systems   Constitutional: Negative for chills and fever.   Respiratory: Negative for chest tightness and shortness of breath.    Musculoskeletal: Positive for back pain.   Skin: Positive for rash.       Objective   Physical Exam  Vitals and nursing note reviewed.   Constitutional:       Appearance: Normal appearance.   Cardiovascular:      Rate and Rhythm: Normal rate.   Pulmonary:      Effort: Pulmonary effort is normal.   Skin:     Comments: There are grouped vesicles in the lumbar area extending to the left.  There are other grouped vesicles at the left groin and iliac crest.   Neurological:      Mental Status: She is alert.           Assessment & Plan   Diagnoses and all orders for this visit:    1. Herpes zoster without complication (Primary)    Other orders  -     valACYclovir (Valtrex) 1000 MG tablet; Take 1 tablet by mouth 2 (Two) Times a Day.  Dispense: 14 tablet; Refill: 0  -     HYDROcodone-acetaminophen (NORCO) 7.5-325 MG per tablet; Take 1 tablet by mouth Every 6 (Six) Hours As Needed for Moderate Pain.  Dispense: 60 tablet; Refill: 0    Madiha is here today for rash.  Appears to be herpes zoster.  Unfortunately is going to postpone her surgery.  I am going to give her some Valtrex to take and have renewed her hydrocodone for her neurogenic back pain.

## 2022-09-29 RX ORDER — MELOXICAM 15 MG/1
15 TABLET ORAL DAILY
Qty: 90 TABLET | Refills: 0 | Status: SHIPPED | OUTPATIENT
Start: 2022-09-29 | End: 2023-01-03

## 2022-09-29 RX ORDER — MELOXICAM 15 MG/1
15 TABLET ORAL DAILY
Qty: 30 TABLET | Refills: 1 | Status: SHIPPED | OUTPATIENT
Start: 2022-09-29 | End: 2022-09-29

## 2022-09-29 RX ORDER — GABAPENTIN 300 MG/1
CAPSULE ORAL
Qty: 90 CAPSULE | Refills: 5 | Status: SHIPPED | OUTPATIENT
Start: 2022-09-29 | End: 2023-03-22

## 2022-09-29 NOTE — TELEPHONE ENCOUNTER
Rx Refill Note  Requested Prescriptions     Pending Prescriptions Disp Refills   • meloxicam (MOBIC) 15 MG tablet [Pharmacy Med Name: MELOXICAM 15MG TABLETS] 90 tablet      Sig: TAKE 1 TABLET BY MOUTH DAILY      Last office visit with prescribing clinician: 9/22/2022      Next office visit with prescribing clinician: Visit date not found            Candido Tillman MA  09/29/22, 09:04 EDT

## 2022-09-29 NOTE — TELEPHONE ENCOUNTER
Rx Refill Note  Requested Prescriptions     Pending Prescriptions Disp Refills   • meloxicam (MOBIC) 15 MG tablet [Pharmacy Med Name: MELOXICAM 15MG TABLETS] 30 tablet 1     Sig: TAKE 1 TABLET BY MOUTH DAILY   • gabapentin (NEURONTIN) 300 MG capsule [Pharmacy Med Name: GABAPENTIN 300MG CAPSULES] 90 capsule      Sig: TAKE 1 CAPSULE BY MOUTH THREE TIMES DAILY      Last office visit with prescribing clinician: 9/22/2022      Next office visit with prescribing clinician: Visit date not found            Candido Tillman MA  09/29/22, 07:15 EDT

## 2022-10-03 RX ORDER — AMLODIPINE BESYLATE AND BENAZEPRIL HYDROCHLORIDE 5; 10 MG/1; MG/1
CAPSULE ORAL
Qty: 90 CAPSULE | Refills: 0 | Status: SHIPPED | OUTPATIENT
Start: 2022-10-03 | End: 2022-12-27

## 2022-10-06 ENCOUNTER — TELEPHONE (OUTPATIENT)
Dept: ORTHOPEDIC SURGERY | Facility: CLINIC | Age: 82
End: 2022-10-06

## 2022-10-06 DIAGNOSIS — M48.062 SPINAL STENOSIS OF LUMBAR REGION WITH NEUROGENIC CLAUDICATION: Primary | ICD-10-CM

## 2022-10-06 NOTE — TELEPHONE ENCOUNTER
Have her see Dr. Wander Wolf for evaluation for surgery.  He may want to do this alone and that is fine.  If he or she want me to be involved then I am happy to do so and you can reschedule her including Dr. Wander Wolf.

## 2022-10-06 NOTE — TELEPHONE ENCOUNTER
Call returned to the patient.  Her shingles are healing, but she would like to know what the next step will be in order to get her surgery rescheduled.  Have advised her that she will need to see Dr. Wolf.  Surgery could be done with both [facility] and Dr. Wolf or Dr. Wolf could do it himself.  Patient would like to have [facility] involved.  Have advised her that after she sees Dr. Wolf her shingles have healed and we can talk about rescheduling her surgery.  Patient understands that Dr. Wolf's office will contact her to set up the appointment

## 2022-10-06 NOTE — TELEPHONE ENCOUNTER
Provider: DR. BOWDEN    Caller: JOY SPRIGLER    Relationship to Patient: SELF    Phone Number: [phone]    Reason for Call: PATIENT HAD TO CANCEL HER SX DUE TO SHINGLES. SHE IS ASKING WHAT NEXT STEPS ARE IN GETTING RESCHEDULED. PLEASE CALL HER TO DISCUSS.

## 2022-10-06 NOTE — TELEPHONE ENCOUNTER
Since its a small procedure, if Dr. Wander Wolf wants to do it himself it might be better if we go that route.

## 2022-10-10 PROCEDURE — 87186 SC STD MICRODIL/AGAR DIL: CPT | Performed by: PHYSICIAN ASSISTANT

## 2022-10-10 PROCEDURE — 87086 URINE CULTURE/COLONY COUNT: CPT | Performed by: PHYSICIAN ASSISTANT

## 2022-10-12 ENCOUNTER — OFFICE VISIT (OUTPATIENT)
Dept: NEUROSURGERY | Facility: CLINIC | Age: 82
End: 2022-10-12

## 2022-10-12 VITALS
WEIGHT: 120 LBS | DIASTOLIC BLOOD PRESSURE: 81 MMHG | HEIGHT: 58 IN | OXYGEN SATURATION: 98 % | HEART RATE: 89 BPM | BODY MASS INDEX: 25.19 KG/M2 | SYSTOLIC BLOOD PRESSURE: 134 MMHG | TEMPERATURE: 98 F

## 2022-10-12 DIAGNOSIS — M48.062 SPINAL STENOSIS OF LUMBAR REGION WITH NEUROGENIC CLAUDICATION: Primary | ICD-10-CM

## 2022-10-12 PROCEDURE — 99204 OFFICE O/P NEW MOD 45 MIN: CPT | Performed by: NEUROLOGICAL SURGERY

## 2022-10-12 RX ORDER — DEXAMETHASONE 4 MG/1
8 TABLET ORAL TAKE AS DIRECTED
Qty: 2 TABLET | Refills: 0 | Status: SHIPPED | OUTPATIENT
Start: 2022-10-12 | End: 2022-10-25 | Stop reason: HOSPADM

## 2022-10-12 NOTE — H&P (VIEW-ONLY)
"Subjective   Patient ID: Joy C Sprigler is a 81 y.o. female is being seen for consultation today at the request of Judson Reagan MD for evaluation for possible fusion.  Patient had a MRI L-spine on 07/07/2022 at [facility].     Treatment: LEYLA    Today patient states that she has low back pain that radiates to B/L legs. Patient also has weakness in B/L legs     Patient, Provider, and MA are all wearing a mask in our office today.     History of Present Illness     This patient has been having pain in her back with radiation into her legs as well as a weak feeling in her legs.  This is been going on since June.  She said in June she had the acute onset of severe pain in her back.  She had a weak feeling in her legs and this is continued ever since.  She did have an epidural block which did not help at all and in fact made her worse.  She also had a prescription for hydrocodone which she has been taking.  She has not done any physical therapy.    The following portions of the patient's history were reviewed and updated as appropriate: allergies, current medications, past family history, past medical history, past social history, past surgical history and problem list.    Review of Systems   Constitutional: Negative for chills and fever.   HENT: Negative for congestion.    Genitourinary: Negative for difficulty urinating and dysuria.   Musculoskeletal: Positive for back pain, gait problem and myalgias.   Neurological: Positive for weakness and numbness.       I have reviewed the review of systems as documented by my MA.      Objective     Vitals:    10/12/22 1507   BP: 134/81   Cuff Size: Adult   Pulse: 89   Temp: 98 °F (36.7 °C)   SpO2: 98%   Weight: 54.4 kg (120 lb)   Height: 147.3 cm (58\")     Body mass index is 25.08 kg/m².      Physical Exam  Constitutional:       Appearance: She is well-developed.   HENT:      Head: Normocephalic and atraumatic.   Eyes:      Extraocular Movements: EOM normal.      " Conjunctiva/sclera: Conjunctivae normal.      Pupils: Pupils are equal, round, and reactive to light.   Neck:      Vascular: No carotid bruit.   Neurological:      Mental Status: She is oriented to person, place, and time.      Coordination: Finger-Nose-Finger Test and Heel to Shin Test normal.      Gait: Gait is intact.      Deep Tendon Reflexes:      Reflex Scores:       Tricep reflexes are 2+ on the right side and 2+ on the left side.       Bicep reflexes are 2+ on the right side and 2+ on the left side.       Brachioradialis reflexes are 2+ on the right side and 2+ on the left side.       Patellar reflexes are 2+ on the right side and 2+ on the left side.       Achilles reflexes are 2+ on the right side and 2+ on the left side.  Psychiatric:         Speech: Speech normal.       Neurologic Exam     Mental Status   Oriented to person, place, and time.   Registration of memory: Good recent and remote memory.   Attention: normal. Concentration: normal.   Speech: speech is normal   Level of consciousness: alert  Knowledge: consistent with education.     Cranial Nerves     CN II   Visual fields full to confrontation.   Visual acuity: normal    CN III, IV, VI   Pupils are equal, round, and reactive to light.  Extraocular motions are normal.     CN V   Facial sensation intact.   Right corneal reflex: normal  Left corneal reflex: normal    CN VII   Facial expression full, symmetric.   Right facial weakness: none  Left facial weakness: none    CN VIII   Hearing: intact    CN IX, X   Palate: symmetric    CN XI   Right sternocleidomastoid strength: normal  Left sternocleidomastoid strength: normal    CN XII   Tongue: not atrophic  Tongue deviation: none    Motor Exam   Muscle bulk: normal  Right arm tone: normal  Left arm tone: normal  Right leg tone: normal  Left leg tone: normal    Strength   Strength 5/5 except as noted.     Sensory Exam   Light touch normal.     Gait, Coordination, and Reflexes     Gait  Gait:  normal    Coordination   Finger to nose coordination: normal  Heel to shin coordination: normal    Reflexes   Right brachioradialis: 2+  Left brachioradialis: 2+  Right biceps: 2+  Left biceps: 2+  Right triceps: 2+  Left triceps: 2+  Right patellar: 2+  Left patellar: 2+  Right achilles: 2+  Left achilles: 2+  Right : 2+  Left : 2+          Assessment & Plan   Independent Review of Radiographic Studies:      I personally reviewed the images from the following studies.    I reviewed an MRI of her lumbar spine done on 7 July of this year.  This shows a widely patent canal and neuroforamina at T12-L1, L1 to and to some extent L2-3.  There is a little foraminal narrowing at L2-3.  At L3-4 there is severe central narrowing with no CSF at all.  There is more moderate narrowing at L4-5 and L5-S1 shows just mild to moderate narrowing.  There is also a compression fracture of L4 but no retropulsion.  There is also a cyst in the left kidney.    Medical Decision Making:      I told the patient and her family about the imaging.  I told her that from my point of view she would likely require a 2 or 3 level decompression but I suggested we do a myelogram to see if we could limit this more.  I told her I thought that she probably sustained a compression fracture which caused her pain to flareup and then because of the stenosis it is been difficult to get rid of.  I told the patient what a myelogram involves.  I explained that there is a 50% chance of developing a bad headache and nausea as a result of the test.  I explained that there is also a very small chance of infection, seizures, and bleeding.  I explained how we would treat a post myelogram headache including bedrest, caffeinated fluids, steroids, and blood patch.  The patient does ask to proceed.    Diagnoses and all orders for this visit:    1. Spinal stenosis of lumbar region with neurogenic claudication (Primary)  -     Obtain Informed Consent; Standing  -      IR Myelogram Lumbar Spine; Future  -     CT Lumbar Spine With Intrathecal Contrast; Future  -     XR Spine Lumbar Complete With Flex & Ext; Future  -     No Lab Testing Needed; Standing  -     dexamethasone (DECADRON) 4 MG tablet; Take 2 tablets by mouth Take As Directed. Take both tablets by mouth 2 hours before myelogram  Dispense: 2 tablet; Refill: 0      Return for After radiology test.

## 2022-10-15 NOTE — PROGRESS NOTES
10/25/22 0002   Pre-Procedure Phone Call   Procedure Time Verified Yes   Arrival Time 0615   Procedure Location Verified Yes   NPO Status Reinforced Yes   Ride and Caregiver Arranged Yes   Patient Knows to Bring Current Medications   (reviewed, list is current, will take decadron pre procedure)

## 2022-10-20 PROCEDURE — 87186 SC STD MICRODIL/AGAR DIL: CPT | Performed by: PHYSICIAN ASSISTANT

## 2022-10-20 PROCEDURE — 87086 URINE CULTURE/COLONY COUNT: CPT | Performed by: PHYSICIAN ASSISTANT

## 2022-10-20 PROCEDURE — 87088 URINE BACTERIA CULTURE: CPT | Performed by: PHYSICIAN ASSISTANT

## 2022-10-25 ENCOUNTER — HOSPITAL ENCOUNTER (OUTPATIENT)
Dept: GENERAL RADIOLOGY | Facility: HOSPITAL | Age: 82
Discharge: HOME | End: 2022-10-25

## 2022-10-25 ENCOUNTER — HOSPITAL ENCOUNTER (OUTPATIENT)
Dept: CT IMAGING | Facility: HOSPITAL | Age: 82
Discharge: HOME | End: 2022-10-25

## 2022-10-25 VITALS
BODY MASS INDEX: 25.19 KG/M2 | HEIGHT: 58 IN | RESPIRATION RATE: 18 BRPM | WEIGHT: 120 LBS | HEART RATE: 82 BPM | SYSTOLIC BLOOD PRESSURE: 108 MMHG | DIASTOLIC BLOOD PRESSURE: 69 MMHG | TEMPERATURE: 97.7 F | OXYGEN SATURATION: 96 %

## 2022-10-25 DIAGNOSIS — M48.062 SPINAL STENOSIS OF LUMBAR REGION WITH NEUROGENIC CLAUDICATION: ICD-10-CM

## 2022-10-25 PROCEDURE — 72114 X-RAY EXAM L-S SPINE BENDING: CPT

## 2022-10-25 PROCEDURE — 62284 INJECTION FOR MYELOGRAM: CPT | Performed by: NEUROLOGICAL SURGERY

## 2022-10-25 PROCEDURE — 0 LIDOCAINE 1 % SOLUTION: Performed by: NEUROLOGICAL SURGERY

## 2022-10-25 PROCEDURE — 72132 CT LUMBAR SPINE W/DYE: CPT

## 2022-10-25 PROCEDURE — 62304 MYELOGRAPHY LUMBAR INJECTION: CPT

## 2022-10-25 PROCEDURE — 0 IOPAMIDOL 41 % SOLUTION: Performed by: NEUROLOGICAL SURGERY

## 2022-10-25 PROCEDURE — 72240 MYELOGRAPHY NECK SPINE: CPT

## 2022-10-25 RX ORDER — LIDOCAINE HYDROCHLORIDE 10 MG/ML
10 INJECTION, SOLUTION INFILTRATION; PERINEURAL ONCE
Status: COMPLETED | OUTPATIENT
Start: 2022-10-25 | End: 2022-10-25

## 2022-10-25 RX ORDER — ACETAMINOPHEN 325 MG/1
650 TABLET ORAL EVERY 4 HOURS PRN
Status: DISCONTINUED | OUTPATIENT
Start: 2022-10-25 | End: 2022-10-26 | Stop reason: HOSPADM

## 2022-10-25 RX ORDER — HYDROCODONE BITARTRATE AND ACETAMINOPHEN 5; 325 MG/1; MG/1
1 TABLET ORAL EVERY 4 HOURS PRN
Status: DISCONTINUED | OUTPATIENT
Start: 2022-10-25 | End: 2022-10-26 | Stop reason: HOSPADM

## 2022-10-25 RX ADMIN — LIDOCAINE HYDROCHLORIDE 5 ML: 10 INJECTION, SOLUTION INFILTRATION; PERINEURAL at 07:09

## 2022-10-25 RX ADMIN — IOPAMIDOL 15 ML: 408 INJECTION, SOLUTION INTRATHECAL at 07:10

## 2022-10-25 NOTE — NURSING NOTE
Pt arrived in radiology triage for Myelogram. Family at bedside.    Protective goggles and mask in place with all patient interactions today

## 2022-10-25 NOTE — DISCHARGE INSTRUCTIONS
EDUCATION /DISCHARGE INSTRUCTIONS:    A myelogram is a special radiology procedure of the spinal cord, spinal nerves and other related structures.  You will be awake during the examination.  An area of your lower back will be cleansed with an antiseptic solution.  The physician will inject a numbing medication in your lower back.  While your back is numb, a needle will be placed in the lower back area.  A small amount of spinal fluid may be withdrawn and sent to the lab if ordered by your physician. While the needle is in the back, an injection of a contrast material (xray dye) will be given through the needle.  The contrast material will allow the physician to see the spinal cord and spinal nerves.  Once injected, the needle will be removed and a band aid will be placed over the injection site.  The table will be tilted during the process to allow the contrast material to flow to particular areas in the spine.  Following the injection and xrays, you will be taken to the CT scanner where more pictures will be taken. After the procedure is finished, the contrast material will be absorbed by your body and eliminated through your kidneys.  The radiologist will study and interpret your myelogram and send the results to your physician.  Procedure risks of a myelogram include, but are not limited to:  *  Bleeding   *  Seizure  *  Infection   *  Headache, possibly severe requiring a blood patch  *  Contrast reaction  *  Nerve or cord injury  *  Paralysis and death  Benefits of the procedure:  *  Best examination for delineating pathology related to spinal cord compression from a disc and/or nerve root compression  Alternatives to the procedure:  MRI - a non invasive procedure requiring intravenous contrast injection.  Cannot be done on patients with certain pacemakers or metal in the body.  MRI risks include possible reaction to the contrast material, movement of metal located in the body.Benefit to MRI:  Non-invasive and  usually painless procedure.  THIS EDUCATION INFORMATION WAS REVIEWED PRIOR TO PROCEDURE AND CONSENT.   Patient initials________JCS________Time________6:40am__________  24 hour rest period ends _________10:00 am Wednesday 10/26/22___________.  Important information following your myelogram:  * ACTIVITY:   *  You may sit up in the car to go home.  *  When you get home, remain on bed rest (flat on your back or on your side) for 24 hours. You may place a rolled up towel under your neck for support  * You may get up to the bathroom and sit up to eat and drink then lie back down  * Drink additional fluids for 24 hours after the myelogram.   * Continue to drink additional fluids for the next 2-3 days. Water and caffeinated beverages are encouraged.  * Remain less active for the next two to three days.  * Do not drive for 24 hours following a myelogram.  * You may remove the bandage and shower in the morning.  CALL YOUR PHYSICIAN FOR THE FOLLOWING:  * Pain at the injection site  * Redness, swelling, bruising or drainage at the injection site.  * A fever by mouth of 101.0 or any new symptoms  Headaches are a common side effect after a myelogram.  If you get a headache, you should stay flat in bed and drink plenty of fluids. If the headache persists and does not go away with rest/medication,   CALL Dr. Wolf at [phone]

## 2022-10-25 NOTE — NURSING NOTE
Pt taken by wheelchair to patient discharge to meet her daughter.    Protective goggles and mask in place with all patient interactions today

## 2022-10-26 ENCOUNTER — TELEPHONE (OUTPATIENT)
Dept: NEUROSURGERY | Facility: CLINIC | Age: 82
End: 2022-10-26

## 2022-10-26 ENCOUNTER — TELEPHONE (OUTPATIENT)
Dept: INTERVENTIONAL RADIOLOGY/VASCULAR | Facility: HOSPITAL | Age: 82
End: 2022-10-26

## 2022-10-26 NOTE — TELEPHONE ENCOUNTER
Pt did well with her Myelogram.  Pt struggled with stability at home and has called 's office. Pt awaiting a call back from Dr. Wolf.

## 2022-10-26 NOTE — TELEPHONE ENCOUNTER
10/27/2022 9:19am  S/w patient and informed that per Dr. Wolf she needs to F/U as planned. Patient expressed understanding.    _________________________________________________    Dr. Wolf Ms. Sprigler fell this morning on her way to the bathroom and hit her back, she said that its sore but not a lot of pain. She was concerned that she messed something up from her myelogram that was done yesterday, but I explained to her that the procedure you did yesterday was done to be able to see where the issues were with her spine, nothing was done to fix her issues. She just wanted to make sure she didn't need to be seen today, and I explained to her that I would let you know about the fall and you would get back with me and let me know what you wanted her to do.

## 2022-10-28 RX ORDER — HYDROCODONE BITARTRATE AND ACETAMINOPHEN 7.5; 325 MG/1; MG/1
1 TABLET ORAL EVERY 6 HOURS PRN
Qty: 60 TABLET | Refills: 0 | Status: SHIPPED | OUTPATIENT
Start: 2022-10-28 | End: 2022-11-21 | Stop reason: HOSPADM

## 2022-10-28 NOTE — TELEPHONE ENCOUNTER
Rx Refill Note  Requested Prescriptions     Pending Prescriptions Disp Refills   • HYDROcodone-acetaminophen (NORCO) 7.5-325 MG per tablet 60 tablet 0     Sig: Take 1 tablet by mouth Every 6 (Six) Hours As Needed for Moderate Pain.      Last office visit with prescribing clinician: 9/22/2022      Next office visit with prescribing clinician: Visit date not found            Candido Tillman MA  10/28/22, 08:40 EDT

## 2022-10-28 NOTE — TELEPHONE ENCOUNTER
Caller: Sprigler, Joy C    Relationship: Self    Best call back number: [phone]    Requested Prescriptions:   Requested Prescriptions     Pending Prescriptions Disp Refills   • HYDROcodone-acetaminophen (NORCO) 7.5-325 MG per tablet 60 tablet 0     Sig: Take 1 tablet by mouth Every 6 (Six) Hours As Needed for Moderate Pain.        Pharmacy where request should be sent: [id number] [place] [id number] AT [place] [id number] & [facility] - [phone]  - [phone] FX     Does the patient have less than a 3 day supply:  [x] Yes  [] No    Norma Plummer Rep   10/28/22 08:23 EDT

## 2022-11-01 ENCOUNTER — OFFICE VISIT (OUTPATIENT)
Dept: NEUROSURGERY | Facility: CLINIC | Age: 82
End: 2022-11-01

## 2022-11-01 VITALS
WEIGHT: 120 LBS | SYSTOLIC BLOOD PRESSURE: 131 MMHG | DIASTOLIC BLOOD PRESSURE: 78 MMHG | OXYGEN SATURATION: 95 % | TEMPERATURE: 98 F | HEART RATE: 88 BPM | BODY MASS INDEX: 25.19 KG/M2 | HEIGHT: 58 IN

## 2022-11-01 DIAGNOSIS — M48.062 SPINAL STENOSIS OF LUMBAR REGION WITH NEUROGENIC CLAUDICATION: Primary | ICD-10-CM

## 2022-11-01 PROCEDURE — 99214 OFFICE O/P EST MOD 30 MIN: CPT | Performed by: NEUROLOGICAL SURGERY

## 2022-11-01 NOTE — PROGRESS NOTES
"Subjective   Patient ID: Joy C Sprigler is a 81 y.o. female is here today for follow-up with a new Lumbar Myelogram done on 10/25/2022 for back and leg pain.    Today patient's symptoms are unchanged. Patient states that she has mild low back pain     Patient, Provider, and MA are all wearing a mask in our office today    History of Present Illness    This patient returns today.  She continues with pain in her back with radiation into her legs as well as a weak feeling in her legs.  She says that since the myelogram she is feeling a little bit better.    The following portions of the patient's history were reviewed and updated as appropriate: allergies, current medications, past family history, past medical history, past social history, past surgical history and problem list.    Review of Systems   Constitutional: Negative for chills and fever.   HENT: Negative for congestion.    Genitourinary: Negative for difficulty urinating and dysuria.   Musculoskeletal: Positive for back pain. Negative for gait problem and myalgias.   Neurological: Positive for weakness. Negative for numbness.       I have reviewed the review of systems as documented by my MA.      Objective     Vitals:    11/01/22 1357   BP: 131/78   Cuff Size: Adult   Pulse: 88   Temp: 98 °F (36.7 °C)   SpO2: 95%   Weight: 54.4 kg (120 lb)   Height: 147.3 cm (58\")     Body mass index is 25.08 kg/m².    Tobacco Use: Low Risk    • Smoking Tobacco Use: Never   • Smokeless Tobacco Use: Never   • Passive Exposure: Not on file          Physical Exam  Neurological:      Mental Status: She is alert and oriented to person, place, and time.       Neurologic Exam     Mental Status   Oriented to person, place, and time.           Assessment & Plan   Independent Review of Radiographic Studies:      I personally reviewed the images from the following studies.    I reviewed his plain films, myelogram, and CT scan myself.  The plain films did not demonstrate any evidence of " instability.  There is remarkably mild degenerative disease for her age.  She does have a old compression fracture of T12 and may be T11 as well.  On the myelogram itself there is severe stenosis at L4-5 less severe at L3-4 there is some bilateral narrowing at L5-S1 causing compression of the nerves.  I far away the worst level is L4-5.  On the lateral films there is a lumbarized first sacral vertebrae.  On the post myelographic CT scan the lower thoracic spine down to L2 looks okay.  It should be noted that the radiologist listed the T12 vertebral body but that vertebrae does not have a rib on it.  The last rib-bearing vertebrae is labeled T11.  Using the numbering scheme I used for the myelogram the first nonrib-bearing vertebrae would be L1.  There is some narrowing at L3-4 although not severe but the most severe narrowing is at L4-5 with no contrast at all.  L5-S1 also shows some foraminal stenosis and the lowest movable level shows a little foraminal stenosis.  There is some wedging of the L L5 vertebral body as well.    I think that if any surgery were done we could consider a minimally invasive decompression at L4-5 or what the radiologist calls L3-4 and see if that helps her.  This would minimize the risk.    Medical Decision Making:      I told the patient and her family about the imaging.  I told her that if she is still having symptoms of weakness in her legs then she probably needs to think about proceeding with the surgery.  On the other hand if she is truly a lot better then she should probably hold off.  She is difficult to get a read on.  I did discuss a lumbar laminectomy with her.  I told the patient about the risks, complications and expected outcome of the lumbar surgery.  I explained that there was an 80% chance of getting rid of the pain in the leg.  I explained that there would still be back pain after the surgery.  Initially this will be quite severe but will improve over time.  There is a 2  or 3% chance of infection, bleeding, CSF leak, damage to the nerve as a result of surgery, paralysis, as well as anesthetic risk.  There is a 5% chance of late instability which could require a fusion later on and a 10% chance of recurrent disc herniation.  We discussed the postoperative hospital and home course.    If she decides to proceed she will need to be scheduled for a: Lumbar 4 to lumbar 5 laminectomy with metrx    Diagnoses and all orders for this visit:    1. Spinal stenosis of lumbar region with neurogenic claudication (Primary)      Return for 2-3 week post op.

## 2022-11-03 ENCOUNTER — PREP FOR SURGERY (OUTPATIENT)
Dept: OTHER | Facility: HOSPITAL | Age: 82
End: 2022-11-03

## 2022-11-03 DIAGNOSIS — M48.062 SPINAL STENOSIS OF LUMBAR REGION WITH NEUROGENIC CLAUDICATION: Primary | ICD-10-CM

## 2022-11-03 RX ORDER — CEFAZOLIN SODIUM 2 G/100ML
2 INJECTION, SOLUTION INTRAVENOUS ONCE
Status: CANCELLED | OUTPATIENT
Start: 2022-11-21 | End: 2022-11-03

## 2022-11-15 ENCOUNTER — PRE-ADMISSION TESTING (OUTPATIENT)
Dept: PREADMISSION TESTING | Facility: HOSPITAL | Age: 82
End: 2022-11-15

## 2022-11-15 VITALS
HEIGHT: 58 IN | RESPIRATION RATE: 16 BRPM | DIASTOLIC BLOOD PRESSURE: 72 MMHG | BODY MASS INDEX: 25.5 KG/M2 | SYSTOLIC BLOOD PRESSURE: 155 MMHG | TEMPERATURE: 97.9 F | HEART RATE: 84 BPM | WEIGHT: 121.5 LBS | OXYGEN SATURATION: 96 %

## 2022-11-15 LAB
ANION GAP SERPL CALCULATED.3IONS-SCNC: 10 MMOL/L (ref 5–15)
BUN SERPL-MCNC: 15 MG/DL (ref 8–23)
BUN/CREAT SERPL: 23.1 (ref 7–25)
CALCIUM SPEC-SCNC: 9.3 MG/DL (ref 8.6–10.5)
CHLORIDE SERPL-SCNC: 102 MMOL/L (ref 98–107)
CO2 SERPL-SCNC: 28 MMOL/L (ref 22–29)
CREAT SERPL-MCNC: 0.65 MG/DL (ref 0.57–1)
DEPRECATED RDW RBC AUTO: 42.7 FL (ref 37–54)
EGFRCR SERPLBLD CKD-EPI 2021: 86.9 ML/MIN/1.73
ERYTHROCYTE [DISTWIDTH] IN BLOOD BY AUTOMATED COUNT: 13.5 % (ref 12.3–15.4)
GLUCOSE SERPL-MCNC: 84 MG/DL (ref 65–99)
HCT VFR BLD AUTO: 39.6 % (ref 34–46.6)
HGB BLD-MCNC: 13.5 G/DL (ref 12–15.9)
MCH RBC QN AUTO: 29.7 PG (ref 26.6–33)
MCHC RBC AUTO-ENTMCNC: 34.1 G/DL (ref 31.5–35.7)
MCV RBC AUTO: 87 FL (ref 79–97)
PLATELET # BLD AUTO: 403 10*3/MM3 (ref 140–450)
PMV BLD AUTO: 10 FL (ref 6–12)
POTASSIUM SERPL-SCNC: 4.1 MMOL/L (ref 3.5–5.2)
RBC # BLD AUTO: 4.55 10*6/MM3 (ref 3.77–5.28)
SODIUM SERPL-SCNC: 140 MMOL/L (ref 136–145)
WBC NRBC COR # BLD: 6.92 10*3/MM3 (ref 3.4–10.8)

## 2022-11-15 PROCEDURE — 85027 COMPLETE CBC AUTOMATED: CPT

## 2022-11-15 PROCEDURE — 80048 BASIC METABOLIC PNL TOTAL CA: CPT

## 2022-11-15 PROCEDURE — 36415 COLL VENOUS BLD VENIPUNCTURE: CPT

## 2022-11-15 NOTE — DISCHARGE INSTRUCTIONS
Take the following medications the morning of surgery: AMLODIPINE-BENAZEPRIL, GABAPENTIN    ARRIVAL TIME: 530AM       If you are on prescription narcotic pain medication to control your pain you may also take that medication the morning of surgery.    General Instructions:  Do not eat solid food after midnight the night before surgery.  You may drink clear liquids day of surgery but must stop at least one hour before your hospital arrival time.  It is beneficial for you to have a clear drink that contains carbohydrates the day of surgery.  We suggest a 12 to 20 ounce bottle of Gatorade or Powerade for non-diabetic patients or a 12 to 20 ounce bottle of G2 or Powerade Zero for diabetic patients. (Pediatric patients, are not advised to drink a 12 to 20 ounce carbohydrate drink)    Clear liquids are liquids you can see through.  Nothing red in color.     Plain water                               Sports drinks  Sodas                                   Gelatin (Jell-O)  Fruit juices without pulp such as white grape juice and apple juice  Popsicles that contain no fruit or yogurt  Tea or coffee (no cream or milk added)  Gatorade / Powerade  G2 / Powerade Zero    Patients who avoid smoking, chewing tobacco and alcohol for 4 weeks prior to surgery have a reduced risk of post-operative complications.  Quit smoking as many days before surgery as you can.  Do not smoke, use chewing tobacco or drink alcohol the day of surgery.   Bring any papers given to you in the doctor’s office.  Wear clean comfortable clothes.  Do not wear contact lenses, false eyelashes or make-up.  Bring a case for your glasses.   Bring crutches or walker if applicable.  Remove all piercings.  Leave jewelry and any other valuables at home.  Hair extensions with metal clips must be removed prior to surgery.  The Pre-Admission Testing nurse will instruct you to bring medications if unable to obtain an accurate list in Pre-Admission Testing.        Preventing  a Surgical Site Infection:  For 2 to 3 days before surgery, avoid shaving with a razor because the razor can irritate skin and make it easier to develop an infection.    Any areas of open skin can increase the risk of a post-operative wound infection by allowing bacteria to enter and travel throughout the body.  Notify your surgeon if you have any skin wounds / rashes even if it is not near the expected surgical site.  The area will need assessed to determine if surgery should be delayed until it is healed.  The night prior to surgery shower using a fresh bar of anti-bacterial soap (such as Dial) and clean washcloth.  Sleep in a clean bed with clean clothing.  Do not allow pets to sleep with you.  Shower on the morning of surgery using a fresh bar of anti-bacterial soap (such as Dial) and clean washcloth.  Dry with a clean towel and dress in clean clothing.  Ask your surgeon if you will be receiving antibiotics prior to surgery.  Make sure you, your family, and all healthcare providers clean their hands with soap and water or an alcohol based hand  before caring for you or your wound.    Day of surgery:  Your arrival time is approximately two hours before your scheduled surgery time.  Upon arrival, a Pre-op nurse and Anesthesiologist will review your health history, obtain vital signs, and answer questions you may have.  The only belongings needed at this time will be a list of your home medications and if applicable your C-PAP/BI-PAP machine.  A Pre-op nurse will start an IV and you may receive medication in preparation for surgery, including something to help you relax.     Please be aware that surgery does come with discomfort.  We want to make every effort to control your discomfort so please discuss any uncontrolled symptoms with your nurse.   Your doctor will most likely have prescribed pain medications.      If you are going home after surgery you will receive individualized written care instructions  before being discharged.  A responsible adult must drive you to and from the hospital on the day of your surgery and stay with you for 24 hours.  Discharge prescriptions can be filled by the hospital pharmacy during regular pharmacy hours.  If you are having surgery late in the day/evening your prescription may be e-prescribed to your pharmacy.  Please verify your pharmacy hours or chose a 24 hour pharmacy to avoid not having access to your prescription because your pharmacy has closed for the day.    If you are staying overnight following surgery, you will be transported to your hospital room following the recovery period.  [facility] has all private rooms.    If you have any questions please call Pre-Admission Testing at [phone].  Deductibles and co-payments are collected on the day of service. Please be prepared to pay the required co-pay, deductible or deposit on the day of service as defined by your plan.    Call your surgeon immediately if you experience any of the following symptoms:  Sore Throat  Shortness of Breath or difficulty breathing  Cough  Chills  Body soreness or muscle pain  Headache  Fever  New loss of taste or smell  Do not arrive for your surgery ill.  Your procedure will need to be rescheduled to another time.  You will need to call your physician before the day of surgery to avoid any unnecessary exposure to hospital staff as well as other patients.     CHLORHEXIDINE CLOTH INSTRUCTIONS  The morning of surgery follow these instructions using the Chlorhexidine cloths you've been given.  These steps reduce bacteria on the body.  Do not use the cloths near your eyes, ears mouth, genitalia or on open wounds.  Throw the cloths away after use but do not try to flush them down a toilet.      Open and remove one cloth at a time from the package.    Leave the cloth unfolded and begin the bathing.  Massage the skin with the cloths using gentle pressure to remove bacteria.  Do not  scrub harshly.   Follow the steps below with one 2% CHG cloth per area (6 total cloths).  One cloth for neck, shoulders and chest.  One cloth for both arms, hands, fingers and underarms (do underarms last).  One cloth for the abdomen followed by groin.  One cloth for right leg and foot including between the toes.  One cloth for left leg and foot including between the toes.  The last cloth is to be used for the back of the neck, back and buttocks.    Allow the CHG to air dry 3 minutes on the skin which will give it time to work and decrease the chance of irritation.  The skin may feel sticky until it is dry.  Do not rinse with water or any other liquid or you will lose the beneficial effects of the CHG.  If mild skin irritation occurs, do rinse the skin to remove the CHG.  Report this to the nurse at time of admission.  Do not apply lotions, creams, ointments, deodorants or perfumes after using the clothes. Dress in clean clothes before coming to the hospital.

## 2022-11-21 ENCOUNTER — HOSPITAL ENCOUNTER (OUTPATIENT)
Facility: HOSPITAL | Age: 82
Setting detail: HOSPITAL OUTPATIENT SURGERY
Discharge: HOME | End: 2022-11-21
Attending: NEUROLOGICAL SURGERY | Admitting: NEUROLOGICAL SURGERY

## 2022-11-21 ENCOUNTER — APPOINTMENT (OUTPATIENT)
Dept: GENERAL RADIOLOGY | Facility: HOSPITAL | Age: 82
End: 2022-11-21

## 2022-11-21 ENCOUNTER — ANESTHESIA EVENT (OUTPATIENT)
Dept: PERIOP | Facility: HOSPITAL | Age: 82
End: 2022-11-21

## 2022-11-21 ENCOUNTER — ANESTHESIA (OUTPATIENT)
Dept: PERIOP | Facility: HOSPITAL | Age: 82
End: 2022-11-21

## 2022-11-21 VITALS
DIASTOLIC BLOOD PRESSURE: 67 MMHG | WEIGHT: 121.8 LBS | RESPIRATION RATE: 18 BRPM | SYSTOLIC BLOOD PRESSURE: 108 MMHG | HEART RATE: 72 BPM | HEIGHT: 58 IN | TEMPERATURE: 97.6 F | OXYGEN SATURATION: 100 % | BODY MASS INDEX: 25.57 KG/M2

## 2022-11-21 DIAGNOSIS — M48.062 SPINAL STENOSIS OF LUMBAR REGION WITH NEUROGENIC CLAUDICATION: ICD-10-CM

## 2022-11-21 PROCEDURE — 25010000002 NEOSTIGMINE 5 MG/10ML SOLUTION: Performed by: NURSE ANESTHETIST, CERTIFIED REGISTERED

## 2022-11-21 PROCEDURE — C1713 ANCHOR/SCREW BN/BN,TIS/BN: HCPCS | Performed by: NEUROLOGICAL SURGERY

## 2022-11-21 PROCEDURE — 25010000002 VANCOMYCIN 1 G RECONSTITUTED SOLUTION 1 EACH VIAL: Performed by: NEUROLOGICAL SURGERY

## 2022-11-21 PROCEDURE — 63047 LAM FACETEC & FORAMOT LUMBAR: CPT | Performed by: NEUROLOGICAL SURGERY

## 2022-11-21 PROCEDURE — 25010000002 DEXAMETHASONE PER 1 MG: Performed by: NURSE ANESTHETIST, CERTIFIED REGISTERED

## 2022-11-21 PROCEDURE — 25010000002 FENTANYL CITRATE (PF) 50 MCG/ML SOLUTION: Performed by: NURSE ANESTHETIST, CERTIFIED REGISTERED

## 2022-11-21 PROCEDURE — 25010000002 CEFAZOLIN IN DEXTROSE 2-4 GM/100ML-% SOLUTION: Performed by: NEUROLOGICAL SURGERY

## 2022-11-21 PROCEDURE — 76000 FLUOROSCOPY <1 HR PHYS/QHP: CPT

## 2022-11-21 PROCEDURE — 72100 X-RAY EXAM L-S SPINE 2/3 VWS: CPT

## 2022-11-21 PROCEDURE — 25010000002 PROPOFOL 10 MG/ML EMULSION: Performed by: NURSE ANESTHETIST, CERTIFIED REGISTERED

## 2022-11-21 DEVICE — SSC BONE WAX
Type: IMPLANTABLE DEVICE | Site: SPINE LUMBAR | Status: FUNCTIONAL
Brand: SSC BONE WAX

## 2022-11-21 DEVICE — FLOSEAL HEMOSTATIC MATRIX, 5ML
Type: IMPLANTABLE DEVICE | Site: SPINE LUMBAR | Status: FUNCTIONAL
Brand: FLOSEAL HEMOSTATIC MATRIX

## 2022-11-21 RX ORDER — FENTANYL CITRATE 50 UG/ML
50 INJECTION, SOLUTION INTRAMUSCULAR; INTRAVENOUS
Status: DISCONTINUED | OUTPATIENT
Start: 2022-11-21 | End: 2022-11-21 | Stop reason: HOSPADM

## 2022-11-21 RX ORDER — CEFAZOLIN SODIUM 2 G/100ML
2 INJECTION, SOLUTION INTRAVENOUS ONCE
Status: COMPLETED | OUTPATIENT
Start: 2022-11-21 | End: 2022-11-21

## 2022-11-21 RX ORDER — SODIUM CHLORIDE, SODIUM LACTATE, POTASSIUM CHLORIDE, CALCIUM CHLORIDE 600; 310; 30; 20 MG/100ML; MG/100ML; MG/100ML; MG/100ML
9 INJECTION, SOLUTION INTRAVENOUS CONTINUOUS
Status: DISCONTINUED | OUTPATIENT
Start: 2022-11-21 | End: 2022-11-21 | Stop reason: HOSPADM

## 2022-11-21 RX ORDER — OXYCODONE AND ACETAMINOPHEN 7.5; 325 MG/1; MG/1
1 TABLET ORAL EVERY 4 HOURS PRN
Status: DISCONTINUED | OUTPATIENT
Start: 2022-11-21 | End: 2022-11-21 | Stop reason: HOSPADM

## 2022-11-21 RX ORDER — SODIUM CHLORIDE 0.9 % (FLUSH) 0.9 %
3 SYRINGE (ML) INJECTION EVERY 12 HOURS SCHEDULED
Status: DISCONTINUED | OUTPATIENT
Start: 2022-11-21 | End: 2022-11-21 | Stop reason: HOSPADM

## 2022-11-21 RX ORDER — DEXAMETHASONE SODIUM PHOSPHATE 4 MG/ML
INJECTION, SOLUTION INTRA-ARTICULAR; INTRALESIONAL; INTRAMUSCULAR; INTRAVENOUS; SOFT TISSUE AS NEEDED
Status: DISCONTINUED | OUTPATIENT
Start: 2022-11-21 | End: 2022-11-21 | Stop reason: SURG

## 2022-11-21 RX ORDER — GLYCOPYRROLATE 0.2 MG/ML
INJECTION INTRAMUSCULAR; INTRAVENOUS AS NEEDED
Status: DISCONTINUED | OUTPATIENT
Start: 2022-11-21 | End: 2022-11-21 | Stop reason: SURG

## 2022-11-21 RX ORDER — LABETALOL HYDROCHLORIDE 5 MG/ML
5 INJECTION, SOLUTION INTRAVENOUS
Status: DISCONTINUED | OUTPATIENT
Start: 2022-11-21 | End: 2022-11-21 | Stop reason: HOSPADM

## 2022-11-21 RX ORDER — DIPHENHYDRAMINE HYDROCHLORIDE 50 MG/ML
12.5 INJECTION INTRAMUSCULAR; INTRAVENOUS
Status: DISCONTINUED | OUTPATIENT
Start: 2022-11-21 | End: 2022-11-21 | Stop reason: HOSPADM

## 2022-11-21 RX ORDER — LIDOCAINE HYDROCHLORIDE 20 MG/ML
INJECTION, SOLUTION EPIDURAL; INFILTRATION; INTRACAUDAL; PERINEURAL AS NEEDED
Status: DISCONTINUED | OUTPATIENT
Start: 2022-11-21 | End: 2022-11-21 | Stop reason: SURG

## 2022-11-21 RX ORDER — PROPOFOL 10 MG/ML
VIAL (ML) INTRAVENOUS AS NEEDED
Status: DISCONTINUED | OUTPATIENT
Start: 2022-11-21 | End: 2022-11-21 | Stop reason: SURG

## 2022-11-21 RX ORDER — NEOSTIGMINE METHYLSULFATE 0.5 MG/ML
INJECTION, SOLUTION INTRAVENOUS AS NEEDED
Status: DISCONTINUED | OUTPATIENT
Start: 2022-11-21 | End: 2022-11-21 | Stop reason: SURG

## 2022-11-21 RX ORDER — FLUMAZENIL 0.1 MG/ML
0.2 INJECTION INTRAVENOUS AS NEEDED
Status: DISCONTINUED | OUTPATIENT
Start: 2022-11-21 | End: 2022-11-21 | Stop reason: HOSPADM

## 2022-11-21 RX ORDER — FENTANYL CITRATE 50 UG/ML
25 INJECTION, SOLUTION INTRAMUSCULAR; INTRAVENOUS
Status: DISCONTINUED | OUTPATIENT
Start: 2022-11-21 | End: 2022-11-21 | Stop reason: HOSPADM

## 2022-11-21 RX ORDER — MIDAZOLAM HYDROCHLORIDE 1 MG/ML
0.5 INJECTION INTRAMUSCULAR; INTRAVENOUS
Status: DISCONTINUED | OUTPATIENT
Start: 2022-11-21 | End: 2022-11-21 | Stop reason: HOSPADM

## 2022-11-21 RX ORDER — HYDROCODONE BITARTRATE AND ACETAMINOPHEN 7.5; 325 MG/1; MG/1
1 TABLET ORAL ONCE AS NEEDED
Status: COMPLETED | OUTPATIENT
Start: 2022-11-21 | End: 2022-11-21

## 2022-11-21 RX ORDER — SODIUM CHLORIDE 0.9 % (FLUSH) 0.9 %
3-10 SYRINGE (ML) INJECTION AS NEEDED
Status: DISCONTINUED | OUTPATIENT
Start: 2022-11-21 | End: 2022-11-21 | Stop reason: HOSPADM

## 2022-11-21 RX ORDER — CEPHALEXIN 500 MG/1
500 CAPSULE ORAL 4 TIMES DAILY
Qty: 20 CAPSULE | Refills: 0 | Status: SHIPPED | OUTPATIENT
Start: 2022-11-21 | End: 2022-11-26

## 2022-11-21 RX ORDER — NALOXONE HCL 0.4 MG/ML
0.2 VIAL (ML) INJECTION AS NEEDED
Status: DISCONTINUED | OUTPATIENT
Start: 2022-11-21 | End: 2022-11-21 | Stop reason: HOSPADM

## 2022-11-21 RX ORDER — HYDROCODONE BITARTRATE AND ACETAMINOPHEN 5; 325 MG/1; MG/1
1 TABLET ORAL EVERY 4 HOURS PRN
Qty: 35 TABLET | Refills: 0 | Status: SHIPPED | OUTPATIENT
Start: 2022-11-21 | End: 2023-03-22

## 2022-11-21 RX ORDER — LIDOCAINE HYDROCHLORIDE 10 MG/ML
0.5 INJECTION, SOLUTION EPIDURAL; INFILTRATION; INTRACAUDAL; PERINEURAL ONCE AS NEEDED
Status: DISCONTINUED | OUTPATIENT
Start: 2022-11-21 | End: 2022-11-21 | Stop reason: HOSPADM

## 2022-11-21 RX ORDER — FENTANYL CITRATE 50 UG/ML
INJECTION, SOLUTION INTRAMUSCULAR; INTRAVENOUS AS NEEDED
Status: DISCONTINUED | OUTPATIENT
Start: 2022-11-21 | End: 2022-11-21 | Stop reason: SURG

## 2022-11-21 RX ORDER — EPHEDRINE SULFATE 50 MG/ML
5 INJECTION, SOLUTION INTRAVENOUS ONCE AS NEEDED
Status: DISCONTINUED | OUTPATIENT
Start: 2022-11-21 | End: 2022-11-21 | Stop reason: HOSPADM

## 2022-11-21 RX ORDER — HYDRALAZINE HYDROCHLORIDE 20 MG/ML
5 INJECTION INTRAMUSCULAR; INTRAVENOUS
Status: DISCONTINUED | OUTPATIENT
Start: 2022-11-21 | End: 2022-11-21 | Stop reason: HOSPADM

## 2022-11-21 RX ORDER — HYDROMORPHONE HYDROCHLORIDE 1 MG/ML
0.25 INJECTION, SOLUTION INTRAMUSCULAR; INTRAVENOUS; SUBCUTANEOUS
Status: DISCONTINUED | OUTPATIENT
Start: 2022-11-21 | End: 2022-11-21 | Stop reason: HOSPADM

## 2022-11-21 RX ORDER — DIPHENHYDRAMINE HCL 25 MG
25 CAPSULE ORAL
Status: DISCONTINUED | OUTPATIENT
Start: 2022-11-21 | End: 2022-11-21 | Stop reason: HOSPADM

## 2022-11-21 RX ORDER — ROCURONIUM BROMIDE 10 MG/ML
INJECTION, SOLUTION INTRAVENOUS AS NEEDED
Status: DISCONTINUED | OUTPATIENT
Start: 2022-11-21 | End: 2022-11-21 | Stop reason: SURG

## 2022-11-21 RX ORDER — ONDANSETRON 2 MG/ML
4 INJECTION INTRAMUSCULAR; INTRAVENOUS ONCE AS NEEDED
Status: DISCONTINUED | OUTPATIENT
Start: 2022-11-21 | End: 2022-11-21 | Stop reason: HOSPADM

## 2022-11-21 RX ORDER — IBUPROFEN 200 MG
400 TABLET ORAL EVERY 6 HOURS PRN
COMMUNITY

## 2022-11-21 RX ORDER — FAMOTIDINE 10 MG/ML
20 INJECTION, SOLUTION INTRAVENOUS ONCE
Status: COMPLETED | OUTPATIENT
Start: 2022-11-21 | End: 2022-11-21

## 2022-11-21 RX ORDER — BUPIVACAINE HCL/0.9 % NACL/PF 0.125 %
PLASTIC BAG, INJECTION (ML) EPIDURAL AS NEEDED
Status: DISCONTINUED | OUTPATIENT
Start: 2022-11-21 | End: 2022-11-21 | Stop reason: SURG

## 2022-11-21 RX ADMIN — FENTANYL CITRATE 50 MCG: 50 INJECTION, SOLUTION INTRAMUSCULAR; INTRAVENOUS at 07:26

## 2022-11-21 RX ADMIN — NEOSTIGMINE METHYLSULFATE 2 MG: 0.5 INJECTION INTRAVENOUS at 08:27

## 2022-11-21 RX ADMIN — FENTANYL CITRATE 25 MCG: 50 INJECTION, SOLUTION INTRAMUSCULAR; INTRAVENOUS at 09:18

## 2022-11-21 RX ADMIN — Medication 100 MCG: at 07:58

## 2022-11-21 RX ADMIN — GLYCOPYRROLATE 0.3 MG: 0.2 INJECTION INTRAMUSCULAR; INTRAVENOUS at 08:27

## 2022-11-21 RX ADMIN — CEFAZOLIN SODIUM 2 G: 2 INJECTION, SOLUTION INTRAVENOUS at 07:17

## 2022-11-21 RX ADMIN — ROCURONIUM BROMIDE 30 MG: 10 INJECTION, SOLUTION INTRAVENOUS at 07:26

## 2022-11-21 RX ADMIN — FAMOTIDINE 20 MG: 10 INJECTION INTRAVENOUS at 06:49

## 2022-11-21 RX ADMIN — DEXAMETHASONE SODIUM PHOSPHATE 8 MG: 4 INJECTION, SOLUTION INTRA-ARTICULAR; INTRALESIONAL; INTRAMUSCULAR; INTRAVENOUS; SOFT TISSUE at 08:21

## 2022-11-21 RX ADMIN — LIDOCAINE HYDROCHLORIDE 100 MG: 20 INJECTION, SOLUTION EPIDURAL; INFILTRATION; INTRACAUDAL; PERINEURAL at 07:26

## 2022-11-21 RX ADMIN — HYDROCODONE BITARTRATE AND ACETAMINOPHEN 1 TABLET: 7.5; 325 TABLET ORAL at 09:18

## 2022-11-21 RX ADMIN — Medication 100 MCG: at 08:17

## 2022-11-21 RX ADMIN — PROPOFOL 120 MG: 10 INJECTION, EMULSION INTRAVENOUS at 07:26

## 2022-11-21 RX ADMIN — SODIUM CHLORIDE, POTASSIUM CHLORIDE, SODIUM LACTATE AND CALCIUM CHLORIDE 9 ML/HR: 600; 310; 30; 20 INJECTION, SOLUTION INTRAVENOUS at 06:30

## 2022-11-21 NOTE — ANESTHESIA PROCEDURE NOTES
Airway  Urgency: elective    Date/Time: 11/21/2022 7:30 AM  Airway not difficult    General Information and Staff    Patient location during procedure: OR  CRNA/CAA: Madiha Angeles CRNA    Indications and Patient Condition  Indications for airway management: airway protection    Preoxygenated: yes  MILS not maintained throughout  Mask difficulty assessment: 1 - vent by mask    Final Airway Details  Final airway type: endotracheal airway      Successful airway: ETT  Cuffed: yes   Successful intubation technique: direct laryngoscopy  Endotracheal tube insertion site: oral  Blade: Loli  Blade size: 3  ETT size (mm): 6.5  Cormack-Lehane Classification: grade I - full view of glottis  Placement verified by: chest auscultation and capnometry   Measured from: lips  ETT/EBT  to lips (cm): 20  Number of attempts at approach: 1  Assessment: lips, teeth, and gum same as pre-op and atraumatic intubation    Additional Comments  Dentition intact and unchanged. CBEBS.  +ETCO2.

## 2022-11-21 NOTE — BRIEF OP NOTE
LUMBAR DISCECTOMY POSTERIOR WITH METRIX  Progress Note    Joy C Sprigler  11/21/2022    Pre-op Diagnosis:   Spinal stenosis of lumbar region with neurogenic claudication [M48.062]       Post-Op Diagnosis Codes:     * Spinal stenosis of lumbar region with neurogenic claudication [M48.062]    Procedure/CPT® Codes:        Procedure(s):  Lumbar 4 to lumbar 5 laminectomy with metrx        Surgeon(s):  Jairo Wolf MD    Anesthesia: General    Staff:   Circulator: Ayana Joseph RN; Master Singer RN  Scrub Person: Kristin Seaman  Assistant: Nohemi Feng CSA  Assistant: Nohemi Feng CSA      Estimated Blood Loss: minimal    Urine Voided: * No values recorded between 11/21/2022  7:21 AM and 11/21/2022  8:22 AM *    Specimens:                None          Drains: * No LDAs found *    Findings: Severe stenosis secondary to ligamentous hypertrophy        Complications: None        Jairo Wolf MD     Date: 11/21/2022  Time: 08:33 EST

## 2022-11-21 NOTE — DISCHARGE INSTRUCTIONS
LUMBAR SURGERY - ROBERT AREVALO M.D.  [address], [address]  [place] [address]  [phone]    Instructions & Care After Your Lumbar Surgery    1. No sitting except on the commode.  You may lie on a firm couch but not on a waterbed or recliner.  You may lie in any position that is comfortable, using only one pillow under your head. Either stand at a counter or lie on your side for meals. Three weeks after surgery you may begin sitting for 30 minutes 3 times per day.    2. No driving for three weeks.  You may ride in the car in a passenger seat that reclines or lying down in the back seat.      3. No bending. If you drop something allow someone else to pick it up.    4. Don’t lift anything heavier than a coffee cup or paperback book.    5. Gradually increase your activity each day.  You should get out of bed every hour during the day.  Walk outside as soon as you feel up to it.  Walk short distances frequently rather than making a long trip.  Frequency is more important than distance.  Your goal is to be walking 2 to 3 miles per day when you return for your post-operative visit. (Never do this in one trip.)    6. You may climb stairs.    7. Remove your bandage the second day after surgery and leave it off.  If you notice any redness, swelling or drainage, call the office.  There are steri-strips across the incision.  If these are still present ten days after surgery, remove them gently.      8. You may shower five days after surgery.  Keep the incision dry until then.  Don’t let the water beat directly on the incision and gently pat it dry.    9. Physical Therapy will be arranged at your post-operative visit if needed.    10. Your prescription for pain medication may be filled for half the original amount prior to your return office visit.  Due to changes in Federal Law in order to have this medication refilled you must contact the office four days prior to the date and make arrangements to pick the  prescription up in the office.  This prescription refill cannot be called in to the pharmacy. Your prescription will be ready for pick-up the day the refill is due.    Don’t be alarmed if you experience some of your pre-operative symptoms after going home.  This is not uncommon and normally goes away in a few days but may last longer.  If you have any questions or concerns, please call our office.

## 2022-11-21 NOTE — OP NOTE
Preop diagnosis: Lumbar stenosis L4-5 with neurogenic claudication    Postop diagnosis: same    Procedure performed: A right L4-5 laminectomy, foraminotomies, medial facetectomy with a crossover to the left opening the entire spinal canal using microsurgical technique microsurgical instrumentation and minimal access spinal technologies    Surgeon: Jairo Wolf M.D.    Assistant: Nohemi Feng CFA who was instrumental in helping with visualization of neural structures, hemostasis, and retraction of neural structures.  Her skilled assistance was necessary for the success of this case    Indications for the procedure:  This is a patient with severe pain in the legs.  Previous imaging had shown neural compression at the L4-5 levels.  As a result of this and the failure of conservative therapy the patient elected to proceed with surgery.    Operative summary:  After induction of general anesthesia the patient was intubated and placed on the operating table in the prone position on a Carlos Alberto table.  All pressure points were padded including peripheral points of entrapment.  The back was prepped with Chloraprep and then draped with Ioban, half sheets, and a split sheet.      The L4-5 level was localized with intraoperative fluoroscopy an incision was made on the right just above the pedicle.  Successive dilating tubes up to 18 mm by 4 cm were placed over that area.  Soft tissue was then removed from the supralaminar space.  The inferior laminar arch of L4 as well as the superior laminar arch of L5 and the medial aspect of facet were removed with the Hubba drill.  The remainder of the operation was done under high-power magnification of the operating microscope using microsurgical technique and microsurgical instrumentation.  The ligamentum flavum was opened and removed out to the level of the pedicle using the Kerrison rongeurs.  This exposed the lateral thecal sac and the nerve root of L5.  Once the lateral recess  was opened the dissection was carried up to the L4 pedicle completely decompressing the superior nerve root.    Once this was done the L5 nerve root was mobilized medially to expose the disc.  There was no evidence of a disc herniation.  Following this the tube was angled to the left side and I was able to open the lateral recess on the left side out to the level of the pedicle as well.  Following this the bleeding was controlled with bipolar cautery.    Once the entire decompression was completed we were able to explore under the nerve root and the thecal sac using the Griffin ball probe to be sure there was no evidence of residual compression.  There being none bleeding was controlled again using the bipolar cautery, FloSeal, and thrombin and Gelfoam.  The area was then copiously irrigated with vancomycin solution and the tube was removed. The paraspinous musculature was injected with 100 cc 1/8% Marcaine with 1:200,000 epinephrine solution.    Another gram of Kefzol was given prior to closure.    The incision was then closed in layers and dressed and the patient was taken to the recovery room in stable condition there were no apparent complications.  Sponge, instrument, and needle counts were correct at the end of the procedure.

## 2022-11-21 NOTE — ADDENDUM NOTE
Addendum  created 11/21/22 1400 by Arsh Frederick MD    Attestation recorded in Intraprocedure, Intraprocedure Attestations filed

## 2022-11-21 NOTE — ANESTHESIA POSTPROCEDURE EVALUATION
"Patient: Joy C Sprigler    Procedure Summary     Date: 11/21/22 Room / Location: [facility] OR [address] [place] [address] MAIN OR    Anesthesia Start: 0721 Anesthesia Stop: 0852    Procedure: Lumbar 4 to lumbar 5 laminectomy with metrx (Spine Lumbar) Diagnosis:       Spinal stenosis of lumbar region with neurogenic claudication      (Spinal stenosis of lumbar region with neurogenic claudication [M48.062])    Surgeons: Jairo Wolf MD Provider: Arsh Frederick MD    Anesthesia Type: general ASA Status: 3          Anesthesia Type: general    Vitals  Vitals Value Taken Time   /67 11/21/22 0931   Temp 36.4 °C (97.6 °F) 11/21/22 0848   Pulse 85 11/21/22 0933   Resp 16 11/21/22 0930   SpO2 99 % 11/21/22 0933   Vitals shown include unvalidated device data.        Post Anesthesia Care and Evaluation    Patient location during evaluation: bedside  Patient participation: complete - patient participated  Level of consciousness: awake and alert  Pain score: 0  Pain management: adequate    Airway patency: patent  Anesthetic complications: No anesthetic complications    Cardiovascular status: acceptable  Respiratory status: acceptable  Hydration status: acceptable    Comments: /67   Pulse 87   Temp 36.4 °C (97.6 °F) (Oral)   Resp 18   Ht 147.3 cm (58\")   Wt 55.2 kg (121 lb 12.8 oz)   SpO2 99%   BMI 25.46 kg/m²       "

## 2022-11-21 NOTE — ANESTHESIA PREPROCEDURE EVALUATION
Anesthesia Evaluation     Patient summary reviewed and Nursing notes reviewed   NPO Solid Status: > 8 hours  NPO Liquid Status: > 4 hours           Airway   Mallampati: II  Neck ROM: full  No difficulty expected  Dental      Comment: crowns    Pulmonary     breath sounds clear to auscultation  Cardiovascular     Rhythm: regular    (+) hypertension, hyperlipidemia,     PE comment: Occasional skipped beat noted    Neuro/Psych  GI/Hepatic/Renal/Endo    (+)   renal disease,     Musculoskeletal     Abdominal    Substance History      OB/GYN          Other   arthritis,                      Anesthesia Plan    ASA 3     general     (Prone position discussed)  intravenous induction     Anesthetic plan, risks, benefits, and alternatives have been provided, discussed and informed consent has been obtained with: patient.        CODE STATUS:

## 2022-11-29 ENCOUNTER — TELEPHONE (OUTPATIENT)
Dept: NEUROSURGERY | Facility: CLINIC | Age: 82
End: 2022-11-29

## 2022-11-29 NOTE — TELEPHONE ENCOUNTER
Provider: MICKEY  Caller: Joy C Sprigler  Relationship to Patient: SELF  Pharmacy: [id number] [place] [address] [address] AT [facility] [place] [address] & [facility] [address]  - [phone]  - [phone] FX  Phone Number: [phone]  Reason for Call: CONSTIPATION AFTER SURGERY  When was the patient last seen: 11/21/22  When did it start: 4 DAYS  Where is it located: BOWELS  Characteristics of symptom/severity: 7/10  Timing- Is it constant or intermittent: CONSTANST  What makes it worse: N/A  What makes it better: NOTHING  What therapies/medications have you tried: STOOL SOFTENERS, FLEET ENEMA    PT CALLED IN STATING SHE IS EXPERIENCING CONSTIPATION FOR THE LAST 4 DAYS. SHE HAD SURGERY WITH DR. AREVALO ON 11/21/22. PT STATES EVERYTHING ELSE RELATING TO HER SURGERY IS GOING VERY WELL, BUT SHE IS CONCERNED WITH STRAINING WHEN ATTEMPTING TO HAVE A BOWEL MOVEMENT AND WOULD LIKE TO KNOW IF DR. AREVALO COULD CALL IN SOMETHING TO HER PHARMACY TO HELP ALLEVIATE THEM.  PLEASE CONTACT PT IN REGARDS TO SYMPTOMS AND/OR IF MEDICATION IS SENT TO PHARMACY.  THANK YOU.

## 2022-11-29 NOTE — TELEPHONE ENCOUNTER
"S/w patient and informed that per Dr. Wolf     \"I would just recommend some MiraLAX in combination with some Dulcolax which she can buy over-the-counter.  If that does not help then she should contact her PCP.\" patient expressed understanding  "

## 2022-12-07 NOTE — PROGRESS NOTES
Subjective   Patient ID: Joy C Sprigler is a 82 y.o. female is here today via telephone  for 2 week PO follow-up. Patient had a Lumbar 4 to lumbar 5 laminectomy with metrx on 11.21.2022    You have chosen to receive care through a telephone visit. Do you consent to use a telephone visit for your medical care today? Yes    We had a telephone visit today.  The patient was at home and I was in the office.  We talked for 5 minutes.      History of Present Illness     This patient is doing well.  She states she had pretty bad pain for the first week after her surgery but now is having almost no pain at all.  Her leg feels much better.  Her incision looks good.    The following portions of the patient's history were reviewed and updated as appropriate: allergies, current medications, past family history, past medical history, past social history, past surgical history and problem list.    Review of Systems    I have reviewed the review of systems as documented by my MA.      Objective       Tobacco Use: Low Risk    • Smoking Tobacco Use: Never   • Smokeless Tobacco Use: Never   • Passive Exposure: Not on file          Physical Exam  Neurological:      Mental Status: She is alert and oriented to person, place, and time.       Neurologic Exam     Mental Status   Oriented to person, place, and time.           Assessment & Plan   Independent Review of Radiographic Studies:      I personally reviewed the images from the following studies.    There is no new imaging to review    Medical Decision Making:      I told the patient that she can start sitting 3 or 4 times a day for 20 or 30 minutes at a time.  We will start her on physical therapy and see her in the office in about a month.    Diagnoses and all orders for this visit:    1. Follow-up examination following surgery (Primary)  -     Ambulatory Referral to Physical Therapy      Return in about 4 weeks (around 1/5/2023).

## 2022-12-08 ENCOUNTER — OFFICE VISIT (OUTPATIENT)
Dept: NEUROSURGERY | Facility: CLINIC | Age: 82
End: 2022-12-08

## 2022-12-08 DIAGNOSIS — Z09 FOLLOW-UP EXAMINATION FOLLOWING SURGERY: Primary | ICD-10-CM

## 2022-12-08 PROCEDURE — 99024 POSTOP FOLLOW-UP VISIT: CPT | Performed by: NEUROLOGICAL SURGERY

## 2022-12-09 ENCOUNTER — TREATMENT (OUTPATIENT)
Dept: PHYSICAL THERAPY | Facility: CLINIC | Age: 82
End: 2022-12-09

## 2022-12-09 DIAGNOSIS — Z98.890 S/P LUMBAR LAMINECTOMY: Primary | ICD-10-CM

## 2022-12-09 DIAGNOSIS — R26.89 BALANCE PROBLEM: ICD-10-CM

## 2022-12-09 DIAGNOSIS — R29.898 LEG WEAKNESS, BILATERAL: ICD-10-CM

## 2022-12-09 PROCEDURE — 97161 PT EVAL LOW COMPLEX 20 MIN: CPT | Performed by: PHYSICAL THERAPIST

## 2022-12-09 PROCEDURE — 97110 THERAPEUTIC EXERCISES: CPT | Performed by: PHYSICAL THERAPIST

## 2022-12-09 NOTE — PROGRESS NOTES
Physical Therapy Initial Evaluation and Plan of Care    [facility]  [address], [address]  [place] [address]    Patient: Joy C Sprigler   : 1940  Diagnosis/ICD-10 Code:  S/P lumbar laminectomy [Z98.890]  Referring practitioner: Jairo Wolf MD  Date of Initial Visit: 2022  Today's Date: 2022  Patient seen for 1 sessions           Subjective Questionnaire: Oswestry: 10%      Subjective Evaluation    History of Present Illness  Mechanism of injury: 82 year old female who had pain in the back of both her thighs.  She reports that this pain started in 2002 and was no resolving, so she opted for surgical intervention.  On 2022 she had Lumbar 4 to lumbar 5 laminectomy.  Since about a week after a surgery she had significant decrease in her pain and she hurts very little now.   She feel she primary issue is L leg weakness.  She is doing well otherwise.      Precautions and Work Restrictions: no bending, no lifting more than coffee cup.Quality of life: good    Pain  No pain reported    Social Support  Lives in: one-story house  Lives with: alone (family checks in daily)    Hand dominance: right    Treatments  Current treatment: physical therapy  Patient Goals  Patient goals for therapy: increased strength, increased motion, improved balance and independence with ADLs/IADLs             Objective        Special Questions      Additional Special Questions  Denies any new bladder dysfunction, bowel dysfunction  or saddle (S4) numbness       Postural Observations  Seated posture: fair  Standing posture: fair        Strength/Myotome Testing     Lumbar   Left   Heel walk: normal  Toe walk: abnormal    Right   Heel walk: normal  Toe walk: abnormal    Left Hip   Planes of Motion   Flexion: 4+  Extension: 5  Abduction: 5  Adduction: 5    Right Hip   Planes of Motion   Flexion: 4+  Extension: 5  Abduction: 5  Adduction: 5    Left Knee   Flexion: 4+  Extension: 4+    Right Knee    Flexion: 4+  Extension: 4+    Left Ankle/Foot   Dorsiflexion: 5  Plantar flexion: 5  Great toe extension: 5    Right Ankle/Foot   Dorsiflexion: 5  Plantar flexion: 5  Great toe extension: 5    Additional Strength Details  Unable to toe walk    Lumbar Flexibility Comments:   Mild deficit in B Hamstring flexibility and tightness with B OCTAVIA test.      See Exercise, Manual, and Modality Logs for complete treatment     Issued, instructed in & performed home exercise program below:   Access Code: [email or web address]  URL: [email or web address]  Date: 12/09/2022  Prepared by: Elias Chavez    Exercises  Supine Lower Trunk Rotation - 1 x daily - 7 x weekly - 1 sets - 10 reps  Supine Posterior Pelvic Tilt - 1 x daily - 7 x weekly - 1 sets - 10 reps - 5 sec hold  Hooklying Gluteal Sets - 1 x daily - 7 x weekly - 1 sets - 10 reps - 5 sec hold  Mini Squat with Counter Support - 1 x daily - 7 x weekly - 1 sets - 10 reps  Heel Toe Raises with Counter Support - 1 x daily - 7 x weekly - 1 sets - 20 reps  Mini Squat with Counter Support - 1 x daily - 7 x weekly - 1 sets - 10 reps  Seated Hamstring Stretch - 1 x daily - 7 x weekly - 1 sets - 3 reps - 20 sec hold      Assessment & Plan     Assessment  Impairments: abnormal gait, impaired balance, impaired physical strength, lacks appropriate home exercise program and safety issue  Functional Limitations: carrying objects, lifting, walking and pulling  Assessment details: 82 year old female who presents with the impairments noted above secondary to decreased lower extremity strength s/p spine surgery,  Patient also has mild balacne and postural issues..  These impairments decrease her ability and tolerance to perform her normal daily activities.  This patient presents with a level of complexity and their condition is such that the services required can be safely and effectively performed only by a qualified PT or supervised PTA.     Prognosis: good    Goals  Plan Goals: STG (4  weeks)  1) Independent in home program for lower extremity and core strengthening  2) Independent in home program for posture correction  3) Demonstrates basic understanding of condition and role in treatment progression  4) Tolerates initiation of resistive lower extremity and core strengthening   5) Demonstrates moderate improvement in tolerance to daily activity per patient subjective reports    LTG (12 Weeks)  1) Independent in home program for self-management of  condition  2) Demonstrates substantial improvement in tolerance to daily activity as evidenced by Oswestry disability score of 8% or less  3) MMT of (B) lower extremities in major planes 4+/5 or greater to allow for safe ambulation and transfers with decreased risk of falls.  4) Demonstrates MMT for trunk flexion and extension of 4+/5 or greater to allow for improved spinal stability and improved tolerance to activities such as transfers and bending.  5) Demonstrates good posture in standing and good posture in sitting to improve tolerance to those positions.  6) Reports ability to return to return to normal daily activities  without increasing pain and without mobility or balance issues      Plan  Therapy options: will be seen for skilled therapy services  Planned modality interventions: cryotherapy, thermotherapy (hydrocollator packs), high voltage pulsed current (pain management), high voltage pulsed current (spasm management), microcurrent electrical stimulation and TENS  Planned therapy interventions: abdominal trunk stabilization, balance/weight-bearing training, body mechanics training, flexibility, functional ROM exercises, gait training, home exercise program, IADL retraining, strengthening, stretching, therapeutic activities, neuromuscular re-education, postural training and soft tissue mobilization  Frequency: 2x week  Duration in weeks: 12  Treatment plan discussed with: patient        History # of Personal Factors and/or Comorbidities: LOW  (0)  Examination of Body System(s): # of elements: LOW (1-2)  Clinical Presentation: STABLE   Clinical Decision Making: LOW     Timed:         Manual Therapy:         mins  28226;     Therapeutic Exercise:    15     mins  70841;     Neuromuscular Eliot:        mins  63295;    Therapeutic Activity:     5     mins  37900;     Gait Training:           mins  15752;     Ultrasound:          mins  44017;    Ionto                                   mins   54239  Self Care                            mins   82372      Un-Timed:  Electrical Stimulation:         mins  41809 ( );  Canalith Repos         mins 56535  Traction          mins 62311  Dry Needle                 ______ mins DRYNDL  Low Eval     25     Mins  00413  Mod Eval          Mins  46490  High Eval                            Mins  04569  Re-Eval                               mins  36302        Timed Treatment:   20   mins   Total Treatment:     45   mins    PT SIGNATURE: Filippo Chavez, ROCIO   DATE TREATMENT INITIATED: 12/9/2022    Initial Certification  Certification Period: 12/9/2022 through 3/9/2023  I certify that the therapy services are furnished while this patient is under my care.  The services outlined above are required by this patient, and will be reviewed every 90 days.     PHYSICIAN: Jairo Wolf MD      DATE:     Please sign and return via fax to [phone]. Thank you, [facility].

## 2022-12-13 ENCOUNTER — TREATMENT (OUTPATIENT)
Dept: PHYSICAL THERAPY | Facility: CLINIC | Age: 82
End: 2022-12-13

## 2022-12-13 ENCOUNTER — TELEPHONE (OUTPATIENT)
Dept: NEUROSURGERY | Facility: CLINIC | Age: 82
End: 2022-12-13

## 2022-12-13 DIAGNOSIS — R26.89 BALANCE PROBLEM: ICD-10-CM

## 2022-12-13 DIAGNOSIS — R29.898 LEG WEAKNESS, BILATERAL: ICD-10-CM

## 2022-12-13 DIAGNOSIS — Z98.890 S/P LUMBAR LAMINECTOMY: Primary | ICD-10-CM

## 2022-12-13 PROCEDURE — 97110 THERAPEUTIC EXERCISES: CPT | Performed by: PHYSICAL THERAPIST

## 2022-12-13 PROCEDURE — 97530 THERAPEUTIC ACTIVITIES: CPT | Performed by: PHYSICAL THERAPIST

## 2022-12-13 NOTE — PROGRESS NOTES
Physical Therapy Treatment Note    Patient: Joy C Sprigler   : 1940  Diagnosis/ICD-10 Code:  S/P lumbar laminectomy [Z98.890]  Referring practitioner: Jairo Wolf MD  Date of Initial Visit: Type: THERAPY  Noted: 2022  Today's Date: 2022  Patient seen for 2 sessions         NEXT MD VISIT: 2023    Subjective     Joy Sprigler reports: She is a little sore in her legs today.  She thinks the leg exercises may have been too much.  She also notes pain deep in the front L groin.    Objective   See Exercise, Manual, and Modality Logs for complete treatment.     Some tenderness to palpation to deep hip flexors on left.  Some tightness in deep hip flexors.  No weakness or pain with MMT of hip.       Held on squats and heel toe raises due to increased groin pain.      Assessment/Plan   New complaints of groin pain.  She cannot recall when it started.  Some tenderness to palpation of deep hip flexors.    Progress per Plan of Care.  Assess groin pain as indicated.  Advance exercises as tolerated.           Manual Therapy:         mins  40842;  Therapeutic Exercise:    20     mins  50229;     Neuromuscular Eliot:        mins  28270;    Therapeutic Activity:     25     mins  39866;     Gait Training:           mins  39712;     Ultrasound:          mins  23665;    Electrical Stimulation:         mins  05321 ( );  Dry Needling          mins self-pay    Timed Treatment:   45   mins   Total Treatment:     45   mins    Filippo Chavez PT  Physical Therapist

## 2022-12-13 NOTE — TELEPHONE ENCOUNTER
I called patient to inquire on what she is requesting. She is scheduled for an in person office visit with Dr. Wolf on 01-05-23. He doesn't have anything sooner. Please see what patients symptoms are and how long they have been going on if she is wanting to be seen sooner.    Vaccine Information Statement    Influenza (Flu) Vaccine (Inactivated or Recombinant): What you need to know    Many Vaccine Information Statements are available in Arabic and other languages. See www.immunize.org/vis  Hojas de Información Sobre Vacunas están disponibles en Español y en muchos otros idiomas. Visite www.immunize.org/vis    1. Why get vaccinated? Influenza (flu) is a contagious disease that spreads around the [place] every year, usually between October and May. Flu is caused by influenza viruses, and is spread mainly by coughing, sneezing, and close contact. Anyone can get flu. Flu strikes suddenly and can last several days. Symptoms vary by age, but can include:   fever/chills   sore throat   muscle aches   fatigue   cough   headache    runny or stuffy nose    Flu can also lead to pneumonia and blood infections, and cause diarrhea and seizures in children. If you have a medical condition, such as heart or lung disease, flu can make it worse. Flu is more dangerous for some people. Infants and young children, people 72years of age and older, pregnant women, and people with certain health conditions or a weakened immune system are at greatest risk. Each year thousands of people in the [facility] die from flu, and many more are hospitalized. Flu vaccine can:   keep you from getting flu,   make flu less severe if you do get it, and   keep you from spreading flu to your family and other people. 2. Inactivated and recombinant flu vaccines    A dose of flu vaccine is recommended every flu season. Children 6 months through 6years of age may need two doses during the same flu season. Everyone else needs only one dose each flu season.        Some inactivated flu vaccines contain a very small amount of a mercury-based preservative called thimerosal. Studies have not shown thimerosal in vaccines to be harmful, but flu vaccines that do not contain thimerosal are available. There is no live flu virus in flu shots. They cannot cause the flu. There are many flu viruses, and they are always changing. Each year a new flu vaccine is made to protect against three or four viruses that are likely to cause disease in the upcoming flu season. But even when the vaccine doesnt exactly match these viruses, it may still provide some protection    Flu vaccine cannot prevent:   flu that is caused by a virus not covered by the vaccine, or   illnesses that look like flu but are not. It takes about 2 weeks for protection to develop after vaccination, and protection lasts through the flu season. 3. Some people should not get this vaccine    Tell the person who is giving you the vaccine:     If you have any severe, life-threatening allergies. If you ever had a life-threatening allergic reaction after a dose of flu vaccine, or have a severe allergy to any part of this vaccine, you may be advised not to get vaccinated. Most, but not all, types of flu vaccine contain a small amount of egg protein.  If you ever had Guillain-Barré Syndrome (also called GBS). Some people with a history of GBS should not get this vaccine. This should be discussed with your doctor.  If you are not feeling well. It is usually okay to get flu vaccine when you have a mild illness, but you might be asked to come back when you feel better. 4. Risks of a vaccine reaction    With any medicine, including vaccines, there is a chance of reactions. These are usually mild and go away on their own, but serious reactions are also possible. Most people who get a flu shot do not have any problems with it.      Minor problems following a flu shot include:    soreness, redness, or swelling where the shot was given     hoarseness   sore, red or itchy eyes   cough   fever   aches   headache   itching   fatigue  If these problems occur, they usually begin soon after the shot and last 1 or 2 days. More serious problems following a flu shot can include the following:     There may be a small increased risk of Guillain-Barré Syndrome (GBS) after inactivated flu vaccine. This risk has been estimated at 1 or 2 additional cases per million people vaccinated. This is much lower than the risk of severe complications from flu, which can be prevented by flu vaccine.  Young children who get the flu shot along with pneumococcal vaccine (PCV13) and/or DTaP vaccine at the same time might be slightly more likely to have a seizure caused by fever. Ask your doctor for more information. Tell your doctor if a child who is getting flu vaccine has ever had a seizure. Problems that could happen after any injected vaccine:      People sometimes faint after a medical procedure, including vaccination. Sitting or lying down for about 15 minutes can help prevent fainting, and injuries caused by a fall. Tell your doctor if you feel dizzy, or have vision changes or ringing in the ears.  Some people get severe pain in the shoulder and have difficulty moving the arm where a shot was given. This happens very rarely.  Any medication can cause a severe allergic reaction. Such reactions from a vaccine are very rare, estimated at about 1 in a million doses, and would happen within a few minutes to a few hours after the vaccination. As with any medicine, there is a very remote chance of a vaccine causing a serious injury or death. The safety of vaccines is always being monitored. For more information, visit: www.cdc.gov/vaccinesafety/    5. What if there is a serious reaction? What should I look for?  Look for anything that concerns you, such as signs of a severe allergic reaction, very high fever, or unusual behavior.     Signs of a severe allergic reaction can include hives, swelling of the face and throat, difficulty breathing, a fast heartbeat, dizziness, and weakness - usually within a few minutes to a few hours after the vaccination. What should I do?  If you think it is a severe allergic reaction or other emergency that cant wait, call 9-1-1 and get the person to the nearest hospital. Otherwise, call your doctor.  Reactions should be reported to the Vaccine Adverse Event Reporting System (VAERS). Your doctor should file this report, or you can do it yourself through  the VAERS web site at www.vaers. [facility].gov, or by calling 2-562.794.1527. VAERS does not give medical advice. 6. The National Vaccine Injury Compensation Program    The [facility] Vaccine Injury Compensation Program (VICP) is a federal program that was created to compensate people who may have been injured by certain vaccines. Persons who believe they may have been injured by a vaccine can learn about the program and about filing a claim by calling 8-416.510.2166 or visiting the [employer] website at www.[facility].gov/vaccinecompensation. There is a time limit to file a claim for compensation. 7. How can I learn more?  Ask your healthcare provider. He or she can give you the vaccine package insert or suggest other sources of information.  Call your local or state health department.  Contact the Centers for Disease Control and Prevention (CDC):  - Call 8-742.942.9629 (1-800-CDC-INFO) or  - Visit CDCs website at www.cdc.gov/flu    Vaccine Information Statement   Inactivated Influenza Vaccine   8/7/2015  42 ANGIE Cobos [id number]    Department of Health and Human Services  Centers for Disease Control and Prevention    Office Use Only

## 2022-12-16 ENCOUNTER — TREATMENT (OUTPATIENT)
Dept: PHYSICAL THERAPY | Facility: CLINIC | Age: 82
End: 2022-12-16

## 2022-12-16 DIAGNOSIS — Z98.890 S/P LUMBAR LAMINECTOMY: Primary | ICD-10-CM

## 2022-12-16 DIAGNOSIS — R26.89 BALANCE PROBLEM: ICD-10-CM

## 2022-12-16 DIAGNOSIS — R29.898 LEG WEAKNESS, BILATERAL: ICD-10-CM

## 2022-12-16 PROCEDURE — 97110 THERAPEUTIC EXERCISES: CPT | Performed by: PHYSICAL THERAPIST

## 2022-12-16 PROCEDURE — 97530 THERAPEUTIC ACTIVITIES: CPT | Performed by: PHYSICAL THERAPIST

## 2022-12-19 NOTE — PROGRESS NOTES
Physical Therapy Treatment Note    Patient: Joy C Sprigler   : 1940  Diagnosis/ICD-10 Code:  S/P lumbar laminectomy [Z98.890]  Referring practitioner: Jairo Wolf MD  Date of Initial Visit: Type: THERAPY  Noted: 2022  Today's Date: 2022  Patient seen for 3 sessions         NEXT MD VISIT: 2023    Subjective     Joy Sprigler reports: Her back pain and radiating leg pain are still not present.  Still complains of some L groin pain that is slightly decreased from last visit.    Objective   See Exercise, Manual, and Modality Logs for complete treatment.     Was able to tolerate all strengthening exercises today.      Progressed / advanced exercises as noted in flow sheets;      Issued, instructed in & performed home exercise program below::  Access Code: [email or web address]  URL: [email or web address]  Date: 2022  Prepared by: Elias Chavez    Exercises  Elias Stretch on Table - 1 x daily - 7 x weekly - 1 sets - 5 reps - 10 sec hold  Supine Lower Trunk Rotation - 1 x daily - 7 x weekly - 1 sets - 10 reps  Supine Posterior Pelvic Tilt - 1 x daily - 7 x weekly - 1 sets - 10 reps - 5 sec hold  Hooklying Gluteal Sets - 1 x daily - 7 x weekly - 1 sets - 10 reps - 5 sec hold  Supine Hip Adduction Isometric with Ball - 1 x daily - 7 x weekly - 1 sets - 10 reps - 5 sec hold  Hooklying Clamshell with Resistance - 1 x daily - 7 x weekly - 1 sets - 10 reps - 5 sec hold  Mini Squat with Counter Support - 1 x daily - 7 x weekly - 2 sets - 10 reps  Heel Toe Raises with Counter Support - 1 x daily - 7 x weekly - 1 sets - 20 reps  Standing 3-Way Leg Reach with Resistance at Ankles and Counter Support - 1 x daily - 7 x weekly - 1 sets - 10 reps        Assessment/Plan   Still with L groin pain, though somewhat diminished.   Good tolerance to interventions today in clinic.    Progress strengthening /stabilization /functional activity as tolerated.  Assess response to today's interventions. Assess response  to changes in home program.            Manual Therapy:         mins  98488;  Therapeutic Exercise:    30     mins  30337;     Neuromuscular Eliot:        mins  81546;    Therapeutic Activity:     10     mins  42328;     Gait Training:           mins  20753;     Ultrasound:          mins  73781;    Electrical Stimulation:         mins  57474 ( );  Dry Needling          mins self-pay    Timed Treatment:   40   mins   Total Treatment:     40   mins    Filippo Chavez PT  Physical Therapist

## 2022-12-20 ENCOUNTER — TREATMENT (OUTPATIENT)
Dept: PHYSICAL THERAPY | Facility: CLINIC | Age: 82
End: 2022-12-20

## 2022-12-20 DIAGNOSIS — R26.89 BALANCE PROBLEM: ICD-10-CM

## 2022-12-20 DIAGNOSIS — R29.898 LEG WEAKNESS, BILATERAL: ICD-10-CM

## 2022-12-20 DIAGNOSIS — Z98.890 S/P LUMBAR LAMINECTOMY: Primary | ICD-10-CM

## 2022-12-20 PROCEDURE — 97110 THERAPEUTIC EXERCISES: CPT | Performed by: PHYSICAL THERAPIST

## 2022-12-20 PROCEDURE — 97530 THERAPEUTIC ACTIVITIES: CPT | Performed by: PHYSICAL THERAPIST

## 2022-12-20 NOTE — PROGRESS NOTES
Physical Therapy Treatment Note    Patient: Joy C Sprigler   : 1940  Diagnosis/ICD-10 Code:  S/P lumbar laminectomy [Z98.890]  Referring practitioner: Jairo Wolf MD  Date of Initial Visit: Type: THERAPY  Noted: 2022  Today's Date: 2022  Patient seen for 4 sessions         NEXT MD VISIT: 2023    Subjective     Joy Sprigler reports: Her back pain and radiating leg pain are still not present.  L groin pain significantly decreased.  Patient is very pleased with how she is feeling today.    Objective   See Exercise, Manual, and Modality Logs for complete treatment.     Was able to tolerate all strengthening exercises today.      Progressed / advanced exercises as noted in flow sheets;      reviewed, instructed in & performed home exercise program below::  Access Code: [email or web address]  URL: [email or web address]  Date: 2022  Prepared by: Elias Chavez    Exercises  Elias Stretch on Table - 1 x daily - 7 x weekly - 1 sets - 5 reps - 10 sec hold  Supine Lower Trunk Rotation - 1 x daily - 7 x weekly - 1 sets - 10 reps  Supine Posterior Pelvic Tilt - 1 x daily - 7 x weekly - 1 sets - 10 reps - 5 sec hold  Hooklying Gluteal Sets - 1 x daily - 7 x weekly - 1 sets - 10 reps - 5 sec hold  Supine Hip Adduction Isometric with Ball - 1 x daily - 7 x weekly - 1 sets - 10 reps - 5 sec hold  Hooklying Clamshell with Resistance - 1 x daily - 7 x weekly - 1 sets - 10 reps - 5 sec hold  Mini Squat with Counter Support - 1 x daily - 7 x weekly - 2 sets - 10 reps  Heel Toe Raises with Counter Support - 1 x daily - 7 x weekly - 1 sets - 20 reps  Standing 3-Way Leg Reach with Resistance at Ankles and Counter Support - 1 x daily - 7 x weekly - 1 sets - 10 reps    Added manual therapy with long axis traction to provide distraction on back and hip.    Assessment/Plan   Only with very minor L groin pain.   Good tolerance to interventions today in clinic.  Doing well with HEP,  therapeutic exercises and  therapeutic activities.  Reported improvement with long axis traction.    Progress strengthening /stabilization /functional activity as tolerated.             Manual Therapy:     5    mins  14137;  Therapeutic Exercise:    25     mins  43168;     Neuromuscular Eliot:        mins  23557;    Therapeutic Activity:     10     mins  69561;     Gait Training:           mins  31054;     Ultrasound:          mins  57309;    Electrical Stimulation:         mins  28425 ( );  Dry Needling          mins self-pay    Timed Treatment:   40   mins   Total Treatment:     40   mins    Filippo Chavez PT  Physical Therapist

## 2022-12-27 ENCOUNTER — TREATMENT (OUTPATIENT)
Dept: PHYSICAL THERAPY | Facility: CLINIC | Age: 82
End: 2022-12-27

## 2022-12-27 DIAGNOSIS — Z98.890 S/P LUMBAR LAMINECTOMY: Primary | ICD-10-CM

## 2022-12-27 DIAGNOSIS — R29.898 LEG WEAKNESS, BILATERAL: ICD-10-CM

## 2022-12-27 DIAGNOSIS — R26.89 BALANCE PROBLEM: ICD-10-CM

## 2022-12-27 PROCEDURE — 97530 THERAPEUTIC ACTIVITIES: CPT | Performed by: PHYSICAL THERAPIST

## 2022-12-27 PROCEDURE — 97110 THERAPEUTIC EXERCISES: CPT | Performed by: PHYSICAL THERAPIST

## 2022-12-27 RX ORDER — AMLODIPINE BESYLATE AND BENAZEPRIL HYDROCHLORIDE 5; 10 MG/1; MG/1
CAPSULE ORAL
Qty: 90 CAPSULE | Refills: 0 | Status: SHIPPED | OUTPATIENT
Start: 2022-12-27 | End: 2023-03-24

## 2022-12-30 ENCOUNTER — TREATMENT (OUTPATIENT)
Dept: PHYSICAL THERAPY | Facility: CLINIC | Age: 82
End: 2022-12-30
Payer: MEDICARE

## 2022-12-30 DIAGNOSIS — Z98.890 S/P LUMBAR LAMINECTOMY: Primary | ICD-10-CM

## 2022-12-30 DIAGNOSIS — R26.89 BALANCE PROBLEM: ICD-10-CM

## 2022-12-30 DIAGNOSIS — R29.898 LEG WEAKNESS, BILATERAL: ICD-10-CM

## 2022-12-30 PROCEDURE — 97530 THERAPEUTIC ACTIVITIES: CPT | Performed by: PHYSICAL THERAPIST

## 2022-12-30 PROCEDURE — 97110 THERAPEUTIC EXERCISES: CPT | Performed by: PHYSICAL THERAPIST

## 2022-12-30 NOTE — LETTER
Progress Note  2023  Jairo Wolf MD    Re: Joy C Sprigler  ________________________________________________________________      Re-Assessment / Progress Note   [facility]  [address], [address]  [place] [address]      Patient: Joy C Sprigler   : 1940  Diagnosis/ICD-10 Code:  S/P lumbar laminectomy [Z98.890]  Referring practitioner: Jairo Wolf MD  Date of Initial Visit: Episode Type: THERAPY  Noted: 2022    Today's Date: 2022  Patient seen for 6 sessions.      Subjective:     Subjective Questionnaire: Oswestry: 18%  Clinical Progress: improved  Home Program Compliance: Yes  Treatment has included: therapeutic exercise, neuromuscular re-education, manual therapy and therapeutic activity    Subjective     Joy Sprigler reports: Her back is essentially pain free and she denies any pain in her lower extremities except for pain in her R lower extremity in the crease of her thigh.  She reports this pain comes and goes and seems to be getting less frequent.    Objective     Postural Observations  Seated posture: fair  Standing posture: fair           Strength/Myotome Testing      Lumbar   Left   Heel walk: normal  Toe walk: abnormal     Right   Heel walk: normal  Toe walk: abnormal     Left Hip   Planes of Motion   Flexion: 4+  Extension: 5  Abduction: 5  Adduction: 5     Right Hip   Planes of Motion   Flexion: 4+  Extension: 5  Abduction: 5  Adduction: 5     Left Knee   Flexion: 4+  Extension: 4+     Right Knee   Flexion: 5  Extension: 5     Left Ankle/Foot   Dorsiflexion: 5  Plantar flexion: 5  Great toe extension: 5     Right Ankle/Foot   Dorsiflexion: 5  Plantar flexion: 5  Great toe extension: 5    Additional Strength Details  Unable to toe walk     Lumbar Flexibility Comments:   Mild deficit in B Hamstring flexibility and tightness with B OCTAVIA test.     Assessment/Plan   Decreased pain in R LE in both intensity and frequency.  Feels that therapy is helping her to  regain her strength.  She does still lack functional strength in her right ankle as evidenced by inability to toe walk.  She does feel like her ability to do double leg heel raises at counter is improving and reports relief of symptoms with therapy sessions.  Would recommend continued PT to improve functional strength and mobility.    Progress toward previous goals: Partially Met    Goals    Short-term goals (STG): 5/5  Long-term goals (LTG): 0/6    STG (4 weeks)  1) Independent in home program for lower extremity and core strengthening (MET)   2) Independent in home program for posture correction (MET)   3) Demonstrates basic understanding of condition and role in treatment progression (MET)   4) Tolerates initiation of resistive lower extremity and core strengthening (MET)   5) Demonstrates moderate improvement in tolerance to daily activity per patient subjective reports (MET)     LTG (12 Weeks)  1) Independent in home program for self-management of  condition (NOT MET)   2) Demonstrates substantial improvement in tolerance to daily activity as evidenced by Oswestry disability score of 8% or less (NOT MET)   3) MMT of (B) lower extremities in major planes 4+/5 or greater to allow for safe ambulation and transfers with decreased risk of falls. (NOT MET)   4) Demonstrates MMT for trunk flexion and extension of 4+/5 or greater to allow for improved spinal stability and improved tolerance to activities such as transfers and bending. (NOT MET)   5) Demonstrates good posture in standing and good posture in sitting to improve tolerance to those positions. (NOT MET)   6) Reports ability to return to return to normal daily activities  without increasing pain and without mobility or balance issues (NOT MET)         Recommendations: Continue as planned  Timeframe: 2 months  Prognosis to achieve goals: good    PT Signature: Filippo Chavez PT  Thank you for this referral.

## 2022-12-30 NOTE — PROGRESS NOTES
Re-Assessment / Progress Note   [facility] [place]  [address], [address]  [place] [address]      Patient: Joy C Sprigler   : 1940  Diagnosis/ICD-10 Code:  S/P lumbar laminectomy [Z98.890]  Referring practitioner: Jairo Wolf MD  Date of Initial Visit: Episode Type: THERAPY  Noted: 2022    Today's Date: 2022  Patient seen for 6 sessions.      Subjective:     Subjective Questionnaire: Oswestry: 18%  Clinical Progress: improved  Home Program Compliance: Yes  Treatment has included: therapeutic exercise, neuromuscular re-education, manual therapy and therapeutic activity    Subjective     Joy Sprigler reports: Her back is essentially pain free and she denies any pain in her lower extremities except for pain in her R lower extremity in the crease of her thigh.  She reports this pain comes and goes and seems to be getting less frequent.    Objective     Postural Observations  Seated posture: fair  Standing posture: fair           Strength/Myotome Testing      Lumbar   Left   Heel walk: normal  Toe walk: abnormal     Right   Heel walk: normal  Toe walk: abnormal     Left Hip   Planes of Motion   Flexion: 4+  Extension: 5  Abduction: 5  Adduction: 5     Right Hip   Planes of Motion   Flexion: 4+  Extension: 5  Abduction: 5  Adduction: 5     Left Knee   Flexion: 4+  Extension: 4+     Right Knee   Flexion: 5  Extension: 5     Left Ankle/Foot   Dorsiflexion: 5  Plantar flexion: 5  Great toe extension: 5     Right Ankle/Foot   Dorsiflexion: 5  Plantar flexion: 5  Great toe extension: 5    Additional Strength Details  Unable to toe walk     Lumbar Flexibility Comments:   Mild deficit in B Hamstring flexibility and tightness with B OCTAVIA test.     Assessment/Plan   Decreased pain in R LE in both intensity and frequency.  Feels that therapy is helping her to regain her strength.  She does still lack functional strength in her right ankle as evidenced by inability to toe walk.  She does feel  like her ability to do double leg heel raises at counter is improving and reports relief of symptoms with therapy sessions.  Would recommend continued PT to improve functional strength and mobility.    Progress toward previous goals: Partially Met    Goals    Short-term goals (STG): 5/5  Long-term goals (LTG): 0/6    STG (4 weeks)  1) Independent in home program for lower extremity and core strengthening (MET)   2) Independent in home program for posture correction (MET)   3) Demonstrates basic understanding of condition and role in treatment progression (MET)   4) Tolerates initiation of resistive lower extremity and core strengthening (MET)   5) Demonstrates moderate improvement in tolerance to daily activity per patient subjective reports (MET)     LTG (12 Weeks)  1) Independent in home program for self-management of  condition (NOT MET)   2) Demonstrates substantial improvement in tolerance to daily activity as evidenced by Oswestry disability score of 8% or less (NOT MET)   3) MMT of (B) lower extremities in major planes 4+/5 or greater to allow for safe ambulation and transfers with decreased risk of falls. (NOT MET)   4) Demonstrates MMT for trunk flexion and extension of 4+/5 or greater to allow for improved spinal stability and improved tolerance to activities such as transfers and bending. (NOT MET)   5) Demonstrates good posture in standing and good posture in sitting to improve tolerance to those positions. (NOT MET)   6) Reports ability to return to return to normal daily activities  without increasing pain and without mobility or balance issues (NOT MET)         Recommendations: Continue as planned  Timeframe: 2 months  Prognosis to achieve goals: good    PT Signature: Filippo Chavez PT      Timed:         Manual Therapy:    5     mins  58533;     Therapeutic Exercise:    25     mins  19392;     Neuromuscular Eliot:        mins  51046;    Therapeutic Activity:     10    mins  23939;     Gait Training:            mins  41389;     Ultrasound:          mins  92364;    Ionto                                   mins   40116  Self Care                            mins   48641        Un-Timed:  Electrical Stimulation:         mins  22941 ( );  Dry Needling          mins self-pay  Traction          mins 42819  Low Eval          Mins  03100  Mod Eval          Mins  75013  High Eval                            Mins  32377  Re-Eval                               mins  56480        Timed Treatment:   40   mins   Total Treatment:     40   mins

## 2023-01-02 NOTE — PROGRESS NOTES
Subjective   Patient ID: Joy C Sprigler is a 82 y.o. female is here today for 4 week PO follow-up. Patient had a Lumbar 4 to lumbar 5 laminectomy with metrx on 11.21.2022    Today patient states that she has intermittent in the L leg. Patient denies low back pain    Patient, Provider, and MA are all wearing a mask in our office today    History of Present Illness     This patient returns today.  She is doing well.  She has a little bit of aching in her left leg but nothing on a regular basis and nothing severe.  She feels much better than preop.    The following portions of the patient's history were reviewed and updated as appropriate: allergies, current medications, past family history, past medical history, past social history, past surgical history and problem list.    Review of Systems   Constitutional: Negative for chills and fever.   HENT: Negative for congestion.    Genitourinary: Negative for difficulty urinating and dysuria.   Musculoskeletal: Positive for myalgias. Negative for back pain.   Neurological: Negative for weakness and numbness.       I have reviewed the review of systems as documented by my MA.      Objective     Vitals:    01/05/23 1036   BP: 119/78   Cuff Size: Adult   Pulse: 78   Temp: 97 °F (36.1 °C)   SpO2: 94%   Weight: 55.2 kg (121 lb 12.8 oz)   Height: 147.3 cm (58\")     Body mass index is 25.46 kg/m².    Tobacco Use: Low Risk    • Smoking Tobacco Use: Never   • Smokeless Tobacco Use: Never   • Passive Exposure: Not on file          Physical Exam  Neurological:      Mental Status: She is alert and oriented to person, place, and time.       Neurologic Exam     Mental Status   Oriented to person, place, and time.           Assessment & Plan   Independent Review of Radiographic Studies:      I personally reviewed the images from the following studies.    There is no new imaging to review    Medical Decision Making:      I told the patient and her friend that she can gradually return to  normal activities.  She should avoid strenuously heavy lifting forever.  Otherwise she can do anything else she wants to do.  She will call if anything flares up in the future.    Diagnoses and all orders for this visit:    1. Follow-up examination following surgery (Primary)      Return if symptoms worsen or fail to improve.

## 2023-01-03 RX ORDER — MELOXICAM 15 MG/1
15 TABLET ORAL DAILY
Qty: 90 TABLET | Refills: 0 | Status: SHIPPED | OUTPATIENT
Start: 2023-01-03 | End: 2023-03-22

## 2023-01-03 NOTE — TELEPHONE ENCOUNTER
Rx Refill Note  Requested Prescriptions     Pending Prescriptions Disp Refills   • meloxicam (MOBIC) 15 MG tablet [Pharmacy Med Name: MELOXICAM 15MG TABLETS] 90 tablet 0     Sig: TAKE 1 TABLET BY MOUTH DAILY      Last office visit with prescribing clinician: 9/22/2022   Last telemedicine visit with prescribing clinician: Visit date not found   Next office visit with prescribing clinician: Visit date not found                         Would you like a call back once the refill request has been completed: [] Yes [] No    If the office needs to give you a call back, can they leave a voicemail: [] Yes [] No    Candido Tillman MA  01/03/23, 07:14 EST  
Yes - the patient is able to be screened

## 2023-01-05 ENCOUNTER — TREATMENT (OUTPATIENT)
Dept: PHYSICAL THERAPY | Facility: CLINIC | Age: 83
End: 2023-01-05
Payer: MEDICARE

## 2023-01-05 ENCOUNTER — OFFICE VISIT (OUTPATIENT)
Dept: NEUROSURGERY | Facility: CLINIC | Age: 83
End: 2023-01-05
Payer: MEDICARE

## 2023-01-05 VITALS
BODY MASS INDEX: 25.57 KG/M2 | HEIGHT: 58 IN | HEART RATE: 78 BPM | TEMPERATURE: 97 F | OXYGEN SATURATION: 94 % | DIASTOLIC BLOOD PRESSURE: 78 MMHG | WEIGHT: 121.8 LBS | SYSTOLIC BLOOD PRESSURE: 119 MMHG

## 2023-01-05 DIAGNOSIS — Z98.890 S/P LUMBAR LAMINECTOMY: Primary | ICD-10-CM

## 2023-01-05 DIAGNOSIS — R26.89 BALANCE PROBLEM: ICD-10-CM

## 2023-01-05 DIAGNOSIS — R29.898 LEG WEAKNESS, BILATERAL: ICD-10-CM

## 2023-01-05 DIAGNOSIS — Z09 FOLLOW-UP EXAMINATION FOLLOWING SURGERY: Primary | ICD-10-CM

## 2023-01-05 PROCEDURE — 97140 MANUAL THERAPY 1/> REGIONS: CPT | Performed by: PHYSICAL THERAPIST

## 2023-01-05 PROCEDURE — 97110 THERAPEUTIC EXERCISES: CPT | Performed by: PHYSICAL THERAPIST

## 2023-01-05 PROCEDURE — 97530 THERAPEUTIC ACTIVITIES: CPT | Performed by: PHYSICAL THERAPIST

## 2023-01-05 PROCEDURE — 99024 POSTOP FOLLOW-UP VISIT: CPT | Performed by: NEUROLOGICAL SURGERY

## 2023-01-05 NOTE — PROGRESS NOTES
Physical Therapy Treatment Note    [facility] [place]  [address], [address]  [place] [address]    Patient: Joy C Sprigler   : 1940  Diagnosis/ICD-10 Code:  S/P lumbar laminectomy [Z98.890]  Referring practitioner: Jairo Wolf MD  Date of Initial Visit: Type: THERAPY  Noted: 2022  Today's Date: 2023  Patient seen for 7 sessions             Subjective     Joy Sprigler reports: Her back is pain-free.  Still some pain and muscle tightness in her L distal quad which causes her to limp, especially after sitting.  Reports seeing Dr. Wolf and being cleared from all activity except for lifting over 15#.        Objective   See Exercise, Manual, and Modality Logs for complete treatment.     Progressed / advanced exercises as noted in flow sheets;      Continued manual therapy with long axis traction to provide distraction on back and hip added STM / IASTM / TPR to lower quads due to increased muscle tension and guarding there.    Assessment/Plan   Still with mild limp during gait and with muscle tightness in distal quads.  Seemed to tolerate manual therapy well.    Progress strengthening /stabilization /functional activity as tolerated.             Manual Therapy:     10    mins  48985;  Therapeutic Exercise:    25     mins  52589;     Neuromuscular Eliot:        mins  91960;    Therapeutic Activity:     10     mins  12103;     Gait Training:           mins  16928;     Ultrasound:          mins  33031;    Electrical Stimulation:         mins  79490 ( );  Dry Needling          mins self-pay    Timed Treatment:   45   mins   Total Treatment:     45   mins    Filippo Chavez PT  Physical Therapist

## 2023-01-10 ENCOUNTER — TREATMENT (OUTPATIENT)
Dept: PHYSICAL THERAPY | Facility: CLINIC | Age: 83
End: 2023-01-10
Payer: MEDICARE

## 2023-01-10 DIAGNOSIS — R29.898 LEG WEAKNESS, BILATERAL: ICD-10-CM

## 2023-01-10 DIAGNOSIS — R26.89 BALANCE PROBLEM: ICD-10-CM

## 2023-01-10 DIAGNOSIS — Z98.890 S/P LUMBAR LAMINECTOMY: Primary | ICD-10-CM

## 2023-01-10 PROCEDURE — 97140 MANUAL THERAPY 1/> REGIONS: CPT | Performed by: PHYSICAL THERAPIST

## 2023-01-10 PROCEDURE — 97110 THERAPEUTIC EXERCISES: CPT | Performed by: PHYSICAL THERAPIST

## 2023-01-10 PROCEDURE — 97530 THERAPEUTIC ACTIVITIES: CPT | Performed by: PHYSICAL THERAPIST

## 2023-01-10 NOTE — PROGRESS NOTES
Physical Therapy Treatment Note    [facility] [place]  [address], [address]  [place] [address]    Patient: Joy C Sprigler   : 1940  Diagnosis/ICD-10 Code:  S/P lumbar laminectomy [Z98.890]  Referring practitioner: Jairo Wolf MD  Date of Initial Visit: Type: THERAPY  Noted: 2022  Today's Date: 1/10/2023  Patient seen for 8 sessions             Subjective     Joy Sprigler reports: Her back continues to be pain-free.  Decreased pain and muscle tightness in her L distal quad.  She feels that the manual therapy last session really seemed to help.         Objective   See Exercise, Manual, and Modality Logs for complete treatment.     Progressed / advanced exercises as noted in flow sheets;      Continued manual therapy with long axis traction to provide distraction on back and hip added STM / IASTM / TPR to lower quads due to increased muscle tension and guarding there.    Assessment/Plan   Decreased limp during gait.  Still with muscle tightness and trigger points in distal quads.    Progress strengthening /stabilization /functional activity as tolerated.             Manual Therapy:    10    mins  77571;  Therapeutic Exercise:    25     mins  94831;     Neuromuscular Eliot:        mins  44953;    Therapeutic Activity:     10     mins  14600;     Gait Training:           mins  17736;     Ultrasound:          mins  98926;    Electrical Stimulation:         mins  82070 ( );  Dry Needling          mins self-pay    Timed Treatment:   45   mins   Total Treatment:     45   mins    Filippo Chavez PT  Physical Therapist

## 2023-01-13 ENCOUNTER — TREATMENT (OUTPATIENT)
Dept: PHYSICAL THERAPY | Facility: CLINIC | Age: 83
End: 2023-01-13
Payer: MEDICARE

## 2023-01-13 DIAGNOSIS — R29.898 LEG WEAKNESS, BILATERAL: ICD-10-CM

## 2023-01-13 DIAGNOSIS — R26.89 BALANCE PROBLEM: ICD-10-CM

## 2023-01-13 DIAGNOSIS — Z98.890 S/P LUMBAR LAMINECTOMY: Primary | ICD-10-CM

## 2023-01-13 PROCEDURE — 97140 MANUAL THERAPY 1/> REGIONS: CPT | Performed by: PHYSICAL THERAPIST

## 2023-01-13 PROCEDURE — 97110 THERAPEUTIC EXERCISES: CPT | Performed by: PHYSICAL THERAPIST

## 2023-01-13 PROCEDURE — 97530 THERAPEUTIC ACTIVITIES: CPT | Performed by: PHYSICAL THERAPIST

## 2023-01-13 NOTE — PROGRESS NOTES
Physical Therapy Treatment Note    [facility] [place]  [address], [address]  [place] [address]    Patient: Joy C Sprigler   : 1940  Diagnosis/ICD-10 Code:  S/P lumbar laminectomy [Z98.890]  Referring practitioner: Jairo Wolf MD  Date of Initial Visit: Type: THERAPY  Noted: 2022  Today's Date: 2023  Patient seen for 9 sessions             Subjective     Joy Sprigler reports: Her back continues to be pain-free.  She still has pain and muscle tightness in her L distal quad, especially when rising after sitting for a while, but this is less than before.         Objective   See Exercise, Manual, and Modality Logs for complete treatment.     Progressed / advanced exercises as noted in flow sheets;      Continued manual therapy with long axis traction to provide distraction on back and hip added STM / IASTM / TPR to lower quads due to increased muscle tension and guarding there.    Continued manual quad stretch over side of plinth.      Assessment/Plan   Triggers points and tissue adhesions seem to be lessening visit over visit.  Patient with decreasing subjective pain reports.    Progress strengthening /stabilization /functional activity as tolerated.             Manual Therapy:    10    mins  95508;  Therapeutic Exercise:    25     mins  92112;     Neuromuscular Eliot:        mins  08185;    Therapeutic Activity:     10     mins  92818;     Gait Training:           mins  71133;     Ultrasound:          mins  39593;    Electrical Stimulation:         mins  93207 ( );  Dry Needling          mins self-pay    Timed Treatment:   45   mins   Total Treatment:     45   mins    Filippo Chavez PT  Physical Therapist

## 2023-01-17 ENCOUNTER — TREATMENT (OUTPATIENT)
Dept: PHYSICAL THERAPY | Facility: CLINIC | Age: 83
End: 2023-01-17
Payer: MEDICARE

## 2023-01-17 DIAGNOSIS — R26.89 BALANCE PROBLEM: ICD-10-CM

## 2023-01-17 DIAGNOSIS — R29.898 LEG WEAKNESS, BILATERAL: ICD-10-CM

## 2023-01-17 DIAGNOSIS — Z98.890 S/P LUMBAR LAMINECTOMY: Primary | ICD-10-CM

## 2023-01-17 PROCEDURE — 97140 MANUAL THERAPY 1/> REGIONS: CPT | Performed by: PHYSICAL THERAPIST

## 2023-01-17 PROCEDURE — 97110 THERAPEUTIC EXERCISES: CPT | Performed by: PHYSICAL THERAPIST

## 2023-01-17 PROCEDURE — 97530 THERAPEUTIC ACTIVITIES: CPT | Performed by: PHYSICAL THERAPIST

## 2023-01-17 NOTE — PROGRESS NOTES
Physical Therapy Treatment Note    [facility] [place]  [address], [address]  [place] [address]    Patient: Joy C Sprigler   : 1940  Diagnosis/ICD-10 Code:  S/P lumbar laminectomy [Z98.890]  Referring practitioner: Jairo Wolf MD  Date of Initial Visit: Type: THERAPY  Noted: 2022  Today's Date: 2023  Patient seen for 10 sessions             Subjective     Joy Sprigler reports: Her back continues to be pain-free.  The pain has moved out of the distal quad and groin and is now in her lateral hip.  It is also decreased in intensity and she has been able to decrease her use of pain medicines.      Objective   See Exercise, Manual, and Modality Logs for complete treatment.     Progressed / advanced exercises as noted in flow sheets;      Continued manual therapy with long axis traction to provide distraction on back and hip added STM / IASTM / TPR to lower quads due to increased muscle tension and guarding there.    Issued, instructed in & performed home exercise program below:   Access Code: [email or web address]  URL: [email or web address]  Date: 2023  Prepared by: Elias Chavez    Exercises  Standing Row with Anchored Resistance - 1 x daily - 7 x weekly - 1 sets - 15 reps        Assessment/Plan   No notable triggers points and tissue adhesions in quads.  Patient with decreasing subjective pain reports and centralization of symptoms.    Progress strengthening /stabilization /functional activity as tolerated.             Manual Therapy:  10      mins  10702;  Therapeutic Exercise:    25     mins  75143;     Neuromuscular Eliot:        mins  93479;    Therapeutic Activity:     10     mins  57230;     Gait Training:           mins  35566;     Ultrasound:          mins  38051;    Electrical Stimulation:         mins  20722 ( );  Dry Needling          mins self-pay    Timed Treatment:   45   mins   Total Treatment:     45   mins    Filippo Chavez PT  Physical  Therapist

## 2023-01-20 ENCOUNTER — TREATMENT (OUTPATIENT)
Dept: PHYSICAL THERAPY | Facility: CLINIC | Age: 83
End: 2023-01-20
Payer: MEDICARE

## 2023-01-20 DIAGNOSIS — Z98.890 S/P LUMBAR LAMINECTOMY: Primary | ICD-10-CM

## 2023-01-20 DIAGNOSIS — R29.898 LEG WEAKNESS, BILATERAL: ICD-10-CM

## 2023-01-20 DIAGNOSIS — R26.89 BALANCE PROBLEM: ICD-10-CM

## 2023-01-20 PROCEDURE — 97110 THERAPEUTIC EXERCISES: CPT | Performed by: PHYSICAL THERAPIST

## 2023-01-20 PROCEDURE — 97530 THERAPEUTIC ACTIVITIES: CPT | Performed by: PHYSICAL THERAPIST

## 2023-01-20 PROCEDURE — 97140 MANUAL THERAPY 1/> REGIONS: CPT | Performed by: PHYSICAL THERAPIST

## 2023-01-20 NOTE — PROGRESS NOTES
Physical Therapy Treatment Note    [facility] [place]  [address]  [place] [address]    Patient: Joy C Sprigler   : 1940  Diagnosis/ICD-10 Code:  S/P lumbar laminectomy [Z98.890]  Referring practitioner: Jairo Wolf MD  Date of Initial Visit: Type: THERAPY  Noted: 2022  Today's Date: 2023  Patient seen for 11 sessions             Subjective     Joy Sprigler reports: Her back continues to be pain-free.  The pain is moving up more in her lateral hip.  She was able to shop with her sister yesterday and had much better tolerance to that activity.      Objective   See Exercise, Manual, and Modality Logs for complete treatment.     Progressed / advanced exercises as noted in flow sheets;      Continued manual therapy with long axis traction to provide distraction on back and hip added STM / IASTM / TPR to lower quads due to increased muscle tension and guarding there.    Reviewed & performed home exercise program below:   Access Code: [email or web address]  URL: [email or web address]  Date: 2023  Prepared by: Elias Chavez    Exercises  Standing Row with Anchored Resistance - 1 x daily - 7 x weekly - 1 sets - 15 reps        Assessment/Plan   Patient with decreasing subjective pain reports and centralization of symptoms as well as improving tolerance to daily activity.    Progress strengthening /stabilization /functional activity as tolerated.             Manual Therapy:  10      mins  27816;  Therapeutic Exercise:    25     mins  70138;     Neuromuscular Eliot:        mins  18914;    Therapeutic Activity:     10     mins  51147;     Gait Training:           mins  38830;     Ultrasound:          mins  39408;    Electrical Stimulation:         mins  85411 ( );  Dry Needling          mins self-pay    Timed Treatment:   45   mins   Total Treatment:     45   mins    Filippo Chavez, PT  Physical Therapist

## 2023-01-24 ENCOUNTER — TREATMENT (OUTPATIENT)
Dept: PHYSICAL THERAPY | Facility: CLINIC | Age: 83
End: 2023-01-24
Payer: MEDICARE

## 2023-01-24 DIAGNOSIS — R29.898 LEG WEAKNESS, BILATERAL: ICD-10-CM

## 2023-01-24 DIAGNOSIS — Z98.890 S/P LUMBAR LAMINECTOMY: Primary | ICD-10-CM

## 2023-01-24 DIAGNOSIS — R26.89 BALANCE PROBLEM: ICD-10-CM

## 2023-01-24 PROCEDURE — 97140 MANUAL THERAPY 1/> REGIONS: CPT | Performed by: PHYSICAL THERAPIST

## 2023-01-24 PROCEDURE — 97530 THERAPEUTIC ACTIVITIES: CPT | Performed by: PHYSICAL THERAPIST

## 2023-01-24 PROCEDURE — 97110 THERAPEUTIC EXERCISES: CPT | Performed by: PHYSICAL THERAPIST

## 2023-01-24 NOTE — PROGRESS NOTES
Physical Therapy Treatment Note    [facility] [place]  [address]  [place] [address]    Patient: Joy C Sprigler   : 1940  Diagnosis/ICD-10 Code:  S/P lumbar laminectomy [Z98.890]  Referring practitioner: Jairo Wolf MD  Date of Initial Visit: Type: THERAPY  Noted: 2022  Today's Date: 2023  Patient seen for 12 sessions             Subjective     Joy Sprigler reports: Her back continues to be pain-free.  The pain is still in her lateral hip and maybe slightly more than last visit.  .    Objective   See Exercise, Manual, and Modality Logs for complete treatment.     Progressed / advanced exercises as noted in flow sheets;      Continued manual therapy with long axis traction to provide distraction on back and hip added STM / IASTM / TPR to lower quads due to increased muscle tension and guarding there.        Assessment/Plan   Patient with slight pain increase today, but overall has been trending well with decreasing subjective pain reports and centralization of symptoms as well as improving tolerance to daily activity.    Progress strengthening /stabilization /functional activity as tolerated.             Manual Therapy:  10      mins  53824;  Therapeutic Exercise:    25     mins  07310;     Neuromuscular Eliot:        mins  93290;    Therapeutic Activity:     10     mins  61468;     Gait Training:           mins  48130;     Ultrasound:          mins  64384;    Electrical Stimulation:         mins  74631 ( );  Dry Needling          mins self-pay    Timed Treatment:   45   mins   Total Treatment:     45   mins    Filippo Chavez PT  Physical Therapist

## 2023-01-27 ENCOUNTER — TREATMENT (OUTPATIENT)
Dept: PHYSICAL THERAPY | Facility: CLINIC | Age: 83
End: 2023-01-27
Payer: MEDICARE

## 2023-01-27 DIAGNOSIS — R29.898 LEG WEAKNESS, BILATERAL: ICD-10-CM

## 2023-01-27 DIAGNOSIS — Z98.890 S/P LUMBAR LAMINECTOMY: Primary | ICD-10-CM

## 2023-01-27 DIAGNOSIS — R26.89 BALANCE PROBLEM: ICD-10-CM

## 2023-01-27 PROCEDURE — 97530 THERAPEUTIC ACTIVITIES: CPT | Performed by: PHYSICAL THERAPIST

## 2023-01-27 PROCEDURE — 97110 THERAPEUTIC EXERCISES: CPT | Performed by: PHYSICAL THERAPIST

## 2023-01-27 NOTE — PROGRESS NOTES
Re-Assessment / Progress Note   [facility] [place]  [address], [address]  [place] [address]      Patient: Joy C Sprigler   : 1940  Diagnosis/ICD-10 Code:  S/P lumbar laminectomy [Z98.890]  Referring practitioner: Jairo Wolf MD  Date of Initial Visit: Episode Type: THERAPY  Noted: 2022    Today's Date: 2023  Patient seen for 13 sessions.      Subjective:     Subjective Questionnaire: Oswestry: 26%  Clinical Progress: improved  Home Program Compliance: Yes  Treatment has included: therapeutic exercise, neuromuscular re-education, manual therapy and therapeutic activity    Subjective     Joy Sprigler reports: Her leg pain continues to decrease and move up her L leg and into her hip.   She is tolerating more daily activities without pain, but still has pain sometimes when rising from sitting in her right thigh.  Overall, she continues to improve.    Objective     Postural Observations  Seated posture: fair  Standing posture: good (improved)           Strength/Myotome Testing      Lumbar   Left   Heel walk: normal  Toe walk: abnormal     Right   Heel walk: normal  Toe walk: abnormal     Left Hip   Planes of Motion   Flexion: 4+  Extension: 5  Abduction: 5  Adduction: 5     Right Hip   Planes of Motion   Flexion: 4+  Extension: 5  Abduction: 5  Adduction: 5     Left Knee   Flexion: 4+  Extension: 4+     Right Knee   Flexion: 5  Extension: 5     Left Ankle/Foot   Dorsiflexion: 5  Plantar flexion: 5  Great toe extension: 5     Right Ankle/Foot   Dorsiflexion: 5  Plantar flexion: 5  Great toe extension: 5    Additional Strength Details  Unable to toe walk     Lumbar Flexibility Comments:   minimal deficit in B Hamstring flexibility and tightness with B OCTAVIA test. (improved)    Assessment/Plan   Continues to have decreasing pain in R LE in regards to both intensity and frequency. Continues to lack functional strength in her right ankle as evidenced by inability to toe walk.  Overall her  functional mobility is improving and her symptoms are diminishing and centralizing.  Would recommend continued PT to continue to restore functional strength and mobility.    Progress toward previous goals: Partially Met    Goals    Short-term goals (STG): 5/5  Long-term goals (LTG): 2/6    STG (4 weeks)  1) Independent in home program for lower extremity and core strengthening (MET)   2) Independent in home program for posture correction (MET)   3) Demonstrates basic understanding of condition and role in treatment progression (MET)   4) Tolerates initiation of resistive lower extremity and core strengthening (MET)   5) Demonstrates moderate improvement in tolerance to daily activity per patient subjective reports (MET)     LTG (12 Weeks)  1) Independent in home program for self-management of  condition (NOT MET)   2) Demonstrates substantial improvement in tolerance to daily activity as evidenced by Oswestry disability score of 8% or less (NOT MET)   3) MMT of (B) lower extremities in major planes 4+/5 or greater to allow for safe ambulation and transfers with decreased risk of falls. (MET)   4) Demonstrates MMT for trunk flexion and extension of 4+/5 or greater to allow for improved spinal stability and improved tolerance to activities such as transfers and bending. (MET)   5) Demonstrates good posture in standing and good posture in sitting to improve tolerance to those positions. (NOT MET)   6) Reports ability to return to return to normal daily activities without increasing pain and without mobility or balance issues (NOT MET)         Recommendations: Continue as planned  Timeframe: 1 month  Prognosis to achieve goals: good    PT Signature: Filippo Chavez PT      Timed:         Manual Therapy:    5     mins  89567;     Therapeutic Exercise:    25     mins  70469;     Neuromuscular Eliot:        mins  26039;    Therapeutic Activity:     10    mins  81116;     Gait Training:           mins  63731;     Ultrasound:           mins  57462;    Ionto                                   mins   04053  Self Care                            mins   89615        Un-Timed:  Electrical Stimulation:         mins  63367 ( );  Dry Needling          mins self-pay  Traction          mins 19485  Low Eval          Mins  40489  Mod Eval          Mins  17014  High Eval                            Mins  51558  Re-Eval                               mins  60236        Timed Treatment:   40   mins   Total Treatment:     40   mins

## 2023-01-27 NOTE — LETTER
Progress Note  2023  Jairo Wolf MD    Re: Joy C Sprigler  ________________________________________________________________  Re-Assessment / Progress Note   [facility] [place]  [address], [address]  [place] [address]      Patient: Joy C Sprigler   : 1940  Diagnosis/ICD-10 Code:  S/P lumbar laminectomy [Z98.890]  Referring practitioner: Jairo Wolf MD  Date of Initial Visit: Episode Type: THERAPY  Noted: 2022    Today's Date: 2023  Patient seen for 13 sessions.      Subjective:     Subjective Questionnaire: Oswestry: 26%  Clinical Progress: improved  Home Program Compliance: Yes  Treatment has included: therapeutic exercise, neuromuscular re-education, manual therapy and therapeutic activity    Subjective     Joy Sprigler reports: Her leg pain continues to decrease and move up her L leg and into her hip.   She is tolerating more daily activities without pain, but still has pain sometimes when rising from sitting in her right thigh.  Overall, she continues to improve.    Objective     Postural Observations  Seated posture: fair  Standing posture: good (improved)           Strength/Myotome Testing      Lumbar   Left   Heel walk: normal  Toe walk: abnormal     Right   Heel walk: normal  Toe walk: abnormal     Left Hip   Planes of Motion   Flexion: 4+  Extension: 5  Abduction: 5  Adduction: 5     Right Hip   Planes of Motion   Flexion: 4+  Extension: 5  Abduction: 5  Adduction: 5     Left Knee   Flexion: 4+  Extension: 4+     Right Knee   Flexion: 5  Extension: 5     Left Ankle/Foot   Dorsiflexion: 5  Plantar flexion: 5  Great toe extension: 5     Right Ankle/Foot   Dorsiflexion: 5  Plantar flexion: 5  Great toe extension: 5    Additional Strength Details  Unable to toe walk     Lumbar Flexibility Comments:   minimal deficit in B Hamstring flexibility and tightness with B OCTAVIA test. (improved)    Assessment/Plan   Continues to have decreasing pain in R LE in regards to both  intensity and frequency. Continues to lack functional strength in her right ankle as evidenced by inability to toe walk.  Overall her functional mobility is improving and her symptoms are diminishing and centralizing.  Would recommend continued PT to continue to restore functional strength and mobility.    Progress toward previous goals: Partially Met    Goals    Short-term goals (STG): 5/5  Long-term goals (LTG): 2/6    STG (4 weeks)  1) Independent in home program for lower extremity and core strengthening (MET)   2) Independent in home program for posture correction (MET)   3) Demonstrates basic understanding of condition and role in treatment progression (MET)   4) Tolerates initiation of resistive lower extremity and core strengthening (MET)   5) Demonstrates moderate improvement in tolerance to daily activity per patient subjective reports (MET)     LTG (12 Weeks)  1) Independent in home program for self-management of  condition (NOT MET)   2) Demonstrates substantial improvement in tolerance to daily activity as evidenced by Oswestry disability score of 8% or less (NOT MET)   3) MMT of (B) lower extremities in major planes 4+/5 or greater to allow for safe ambulation and transfers with decreased risk of falls. (MET)   4) Demonstrates MMT for trunk flexion and extension of 4+/5 or greater to allow for improved spinal stability and improved tolerance to activities such as transfers and bending. (MET)   5) Demonstrates good posture in standing and good posture in sitting to improve tolerance to those positions. (NOT MET)   6) Reports ability to return to return to normal daily activities without increasing pain and without mobility or balance issues (NOT MET)         Recommendations: Continue as planned  Timeframe: 1 month  Prognosis to achieve goals: good    PT Signature: Filippo Chavez PT  Thank you for this referral.

## 2023-01-30 ENCOUNTER — TREATMENT (OUTPATIENT)
Dept: PHYSICAL THERAPY | Facility: CLINIC | Age: 83
End: 2023-01-30
Payer: MEDICARE

## 2023-01-30 DIAGNOSIS — Z98.890 S/P LUMBAR LAMINECTOMY: Primary | ICD-10-CM

## 2023-01-30 DIAGNOSIS — R29.898 LEG WEAKNESS, BILATERAL: ICD-10-CM

## 2023-01-30 DIAGNOSIS — R26.89 BALANCE PROBLEM: ICD-10-CM

## 2023-01-30 PROCEDURE — 97140 MANUAL THERAPY 1/> REGIONS: CPT | Performed by: PHYSICAL THERAPIST

## 2023-01-30 PROCEDURE — 97110 THERAPEUTIC EXERCISES: CPT | Performed by: PHYSICAL THERAPIST

## 2023-01-30 PROCEDURE — 97530 THERAPEUTIC ACTIVITIES: CPT | Performed by: PHYSICAL THERAPIST

## 2023-01-30 NOTE — PROGRESS NOTES
Physical Therapy Treatment Note    [facility] [place]  [address], [address]  [place] [address]    Patient: Joy C Sprigler   : 1940  Diagnosis/ICD-10 Code:  S/P lumbar laminectomy [Z98.890]  Referring practitioner: Jairo Wolf MD  Date of Initial Visit: Type: THERAPY  Noted: 2022  Today's Date: 2023  Patient seen for 14 sessions             Subjective     Joy Sprigler reports: Her back continues to be pain-free.  Some pain in her left hip still but getting better.   She reports that her hip was hurting when rising from sitting almost every time, but now it only does so sporadically.    Objective   See Exercise, Manual, and Modality Logs for complete treatment.     Progressed / advanced exercises as noted in flow sheets;      Continued manual therapy with long axis traction to provide distraction on back and hip added STM / IASTM / TPR to lower quads due to increased muscle tension and guarding there.      Assessment/Plan   Continues to report decreasing subjective pain and centralization of symptoms as well as improving tolerance to daily activity.  Improved tolerance with sit to stand.    Progress strengthening /stabilization /functional activity as tolerated.             Manual Therapy:  10      mins  55795;  Therapeutic Exercise:    25     mins  15125;     Neuromuscular Eliot:        mins  91266;    Therapeutic Activity:     10     mins  20772;     Gait Training:           mins  38125;     Ultrasound:          mins  11773;    Electrical Stimulation:         mins  96735 ( );  Dry Needling          mins self-pay    Timed Treatment:   45   mins   Total Treatment:     45   mins    Filippo Chavez PT  Physical Therapist

## 2023-02-03 ENCOUNTER — TREATMENT (OUTPATIENT)
Dept: PHYSICAL THERAPY | Facility: CLINIC | Age: 83
End: 2023-02-03
Payer: MEDICARE

## 2023-02-03 DIAGNOSIS — R26.89 BALANCE PROBLEM: ICD-10-CM

## 2023-02-03 DIAGNOSIS — R29.898 LEG WEAKNESS, BILATERAL: ICD-10-CM

## 2023-02-03 DIAGNOSIS — Z98.890 S/P LUMBAR LAMINECTOMY: Primary | ICD-10-CM

## 2023-02-03 PROCEDURE — 97110 THERAPEUTIC EXERCISES: CPT | Performed by: PHYSICAL THERAPIST

## 2023-02-03 PROCEDURE — 97530 THERAPEUTIC ACTIVITIES: CPT | Performed by: PHYSICAL THERAPIST

## 2023-02-03 PROCEDURE — 97140 MANUAL THERAPY 1/> REGIONS: CPT | Performed by: PHYSICAL THERAPIST

## 2023-02-03 NOTE — PROGRESS NOTES
Physical Therapy Treatment Note    [facility] [place] [place]  [address], [address]  [place] [address]    Patient: Joy C Sprigler   : 1940  Diagnosis/ICD-10 Code:  S/P lumbar laminectomy [Z98.890]  Referring practitioner: Jairo Wolf MD  Date of Initial Visit: Type: THERAPY  Noted: 2022  Today's Date: 2/3/2023  Patient seen for 15 sessions             Subjective     Joy Sprigler reports: She feels like she is slowly improving.  Still some pain in the top and front of her left leg but it is getting better over time.    Objective   See Exercise, Manual, and Modality Logs for complete treatment.     Progressed / advanced exercises as noted in flow sheets;      Continued manual therapy with long axis traction to provide distraction on back and hip added STM / IASTM / TPR to lower quads due to increased muscle tension and guarding there.      Assessment/Plan   Continues to report decreasing subjective pain and centralization of symptoms as well as improving tolerance to daily activity.  Improved tolerance with sit to stand and tolerance to daily activity per patient subjective reports.    Progress strengthening /stabilization /functional activity as tolerated.             Manual Therapy:  10      mins  31014;  Therapeutic Exercise:    25     mins  91223;     Neuromuscular Eliot:        mins  84252;    Therapeutic Activity:     10     mins  17097;     Gait Training:           mins  29669;     Ultrasound:          mins  07731;    Electrical Stimulation:         mins  99287 ( );  Dry Needling          mins self-pay    Timed Treatment:   45   mins   Total Treatment:     45   mins    Filippo Chavez PT  Physical Therapist

## 2023-02-06 ENCOUNTER — TREATMENT (OUTPATIENT)
Dept: PHYSICAL THERAPY | Facility: CLINIC | Age: 83
End: 2023-02-06
Payer: MEDICARE

## 2023-02-06 DIAGNOSIS — Z98.890 S/P LUMBAR LAMINECTOMY: Primary | ICD-10-CM

## 2023-02-06 DIAGNOSIS — R26.89 BALANCE PROBLEM: ICD-10-CM

## 2023-02-06 DIAGNOSIS — R29.898 LEG WEAKNESS, BILATERAL: ICD-10-CM

## 2023-02-06 PROCEDURE — 97530 THERAPEUTIC ACTIVITIES: CPT | Performed by: PHYSICAL THERAPIST

## 2023-02-06 PROCEDURE — 97140 MANUAL THERAPY 1/> REGIONS: CPT | Performed by: PHYSICAL THERAPIST

## 2023-02-06 PROCEDURE — 97110 THERAPEUTIC EXERCISES: CPT | Performed by: PHYSICAL THERAPIST

## 2023-02-06 NOTE — PROGRESS NOTES
Physical Therapy Treatment Note    [facility] [place]  [address], [address]  [place] [address]    Patient: Joy C Sprigler   : 1940  Diagnosis/ICD-10 Code:  S/P lumbar laminectomy [Z98.890]  Referring practitioner: Jairo Wolf MD  Date of Initial Visit: Type: THERAPY  Noted: 2022  Today's Date: 2023  Patient seen for 16 sessions             Subjective     Joy Sprigler reports: Taking ibuprofen less frequently.  Still with some minor anterior upper thigh pain, but this continues to diminish.        Objective   See Exercise, Manual, and Modality Logs for complete treatment.     Progressed / advanced exercises as noted in flow sheets;      Continued manual therapy with long axis traction to provide distraction on back and hip added STM / IASTM / TPR to lower quads due to increased muscle tension and guarding there.    Added static balance activities.      Assessment/Plan   Continues to report decreasing subjective pain and centralization of symptoms as well as improving tolerance to daily activity.  Improved tolerance with sit to stand and tolerance to daily activity per patient subjective reports.  Decreased use of pain medications.    Progress strengthening /stabilization /functional activity as tolerated.  Progress balance activities as tolerated.           Manual Therapy:  10      mins  40771;  Therapeutic Exercise:    25     mins  23406;     Neuromuscular Eliot:        mins  90174;    Therapeutic Activity:     10     mins  11489;     Gait Training:           mins  50985;     Ultrasound:          mins  04181;    Electrical Stimulation:         mins  31367 ( );  Dry Needling          mins self-pay    Timed Treatment:   45   mins   Total Treatment:     45   mins    Filippo Chavez PT  Physical Therapist

## 2023-02-10 ENCOUNTER — TREATMENT (OUTPATIENT)
Dept: PHYSICAL THERAPY | Facility: CLINIC | Age: 83
End: 2023-02-10
Payer: MEDICARE

## 2023-02-10 DIAGNOSIS — R29.898 LEG WEAKNESS, BILATERAL: ICD-10-CM

## 2023-02-10 DIAGNOSIS — Z98.890 S/P LUMBAR LAMINECTOMY: Primary | ICD-10-CM

## 2023-02-10 DIAGNOSIS — R26.89 BALANCE PROBLEM: ICD-10-CM

## 2023-02-10 PROCEDURE — 97530 THERAPEUTIC ACTIVITIES: CPT | Performed by: PHYSICAL THERAPIST

## 2023-02-10 PROCEDURE — 97140 MANUAL THERAPY 1/> REGIONS: CPT | Performed by: PHYSICAL THERAPIST

## 2023-02-10 PROCEDURE — 97110 THERAPEUTIC EXERCISES: CPT | Performed by: PHYSICAL THERAPIST

## 2023-02-10 NOTE — PROGRESS NOTES
Physical Therapy Treatment Note    [facility] [place]  [address], [address]  [place] [address]    Patient: Joy C Sprigler   : 1940  Diagnosis/ICD-10 Code:  S/P lumbar laminectomy [Z98.890]  Referring practitioner: Jairo Wolf MD  Date of Initial Visit: Type: THERAPY  Noted: 2022  Today's Date: 2/10/2023  Patient seen for 17 sessions             Subjective     Joy Sprigler reports: Taking ibuprofen less frequently.  Still with some minor anterior upper thigh pain, but this continues to diminish.  Some pain still intermittently when rising from chair.      Objective   See Exercise, Manual, and Modality Logs for complete treatment.     Progressed / advanced exercises as noted in flow sheets;      Continued manual therapy with long axis traction to provide distraction on back and hip added STM / IASTM / TPR to lower quads due to increased muscle tension and guarding there.  Added lateral distraction     Added static balance activities.      Assessment/Plan   Continues to report decreasing subjective pain and centralization of symptoms as well as improving tolerance to daily activity. Still with pain at times with sit to stand.  Did note initial improvement after lateral hip distraction.         Progress strengthening /stabilization /functional activity as tolerated.  Progress balance activities as tolerated.           Manual Therapy:  10      mins  69755;  Therapeutic Exercise:    25     mins  88146;     Neuromuscular Eliot:        mins  68035;    Therapeutic Activity:     10     mins  99668;     Gait Training:           mins  00977;     Ultrasound:          mins  84551;    Electrical Stimulation:         mins  36201 ( );  Dry Needling          mins self-pay    Timed Treatment:   45   mins   Total Treatment:     45   mins    Filippo Chavez PT  Physical Therapist

## 2023-02-15 ENCOUNTER — TREATMENT (OUTPATIENT)
Dept: PHYSICAL THERAPY | Facility: CLINIC | Age: 83
End: 2023-02-15
Payer: MEDICARE

## 2023-02-15 DIAGNOSIS — R26.89 BALANCE PROBLEM: ICD-10-CM

## 2023-02-15 DIAGNOSIS — Z98.890 S/P LUMBAR LAMINECTOMY: Primary | ICD-10-CM

## 2023-02-15 DIAGNOSIS — R29.898 LEG WEAKNESS, BILATERAL: ICD-10-CM

## 2023-02-15 PROCEDURE — 97110 THERAPEUTIC EXERCISES: CPT | Performed by: PHYSICAL THERAPIST

## 2023-02-15 PROCEDURE — 97530 THERAPEUTIC ACTIVITIES: CPT | Performed by: PHYSICAL THERAPIST

## 2023-02-15 PROCEDURE — 97140 MANUAL THERAPY 1/> REGIONS: CPT | Performed by: PHYSICAL THERAPIST

## 2023-02-15 NOTE — PROGRESS NOTES
Physical Therapy Treatment Note    [facility] [place] [place]  [address], [address]  [place] [address]    Patient: Joy C Sprigler   : 1940  Diagnosis/ICD-10 Code:  S/P lumbar laminectomy [Z98.890]  Referring practitioner: Jairo Wolf MD  Date of Initial Visit: Type: THERAPY  Noted: 2022  Today's Date: 2/15/2023  Patient seen for 18 sessions             Subjective     Joy Sprigler reports: Anterior hip pain is dramatically reduced which she attriibutes to added manual therapy last visit.  She is not having pain upon rising near as much.      Objective   See Exercise, Manual, and Modality Logs for complete treatment.     Progressed / advanced exercises as noted in flow sheets;      Continued manual therapy with long axis traction to provide distraction on back and hip STM / IASTM / TPR to lower quads due to increased muscle tension and guarding there.    Continued static balance activities.      Assessment/Plan   Continues to report decreasing subjective pain and centralization of symptoms as well as improving tolerance to daily activity.  Improved tolerance with sit to stand and tolerance to daily activity per patient subjective reports.  Decreased use of pain medications.    Progress strengthening /stabilization /functional activity as tolerated.  Progress balance activities as tolerated.           Manual Therapy:  10      mins  32618;  Therapeutic Exercise:    25     mins  97554;     Neuromuscular Eliot:        mins  54355;    Therapeutic Activity:     10     mins  53357;     Gait Training:           mins  35454;     Ultrasound:          mins  51585;    Electrical Stimulation:         mins  77909 ( );  Dry Needling          mins self-pay    Timed Treatment:   45   mins   Total Treatment:     45   mins    Filippo Chavez PT  Physical Therapist

## 2023-02-17 ENCOUNTER — TREATMENT (OUTPATIENT)
Dept: PHYSICAL THERAPY | Facility: CLINIC | Age: 83
End: 2023-02-17
Payer: MEDICARE

## 2023-02-17 DIAGNOSIS — R26.89 BALANCE PROBLEM: ICD-10-CM

## 2023-02-17 DIAGNOSIS — R29.898 LEG WEAKNESS, BILATERAL: ICD-10-CM

## 2023-02-17 DIAGNOSIS — Z98.890 S/P LUMBAR LAMINECTOMY: Primary | ICD-10-CM

## 2023-02-17 PROCEDURE — 97140 MANUAL THERAPY 1/> REGIONS: CPT | Performed by: PHYSICAL THERAPIST

## 2023-02-17 PROCEDURE — 97110 THERAPEUTIC EXERCISES: CPT | Performed by: PHYSICAL THERAPIST

## 2023-02-17 PROCEDURE — 97530 THERAPEUTIC ACTIVITIES: CPT | Performed by: PHYSICAL THERAPIST

## 2023-02-20 ENCOUNTER — TREATMENT (OUTPATIENT)
Dept: PHYSICAL THERAPY | Facility: CLINIC | Age: 83
End: 2023-02-20
Payer: MEDICARE

## 2023-02-20 DIAGNOSIS — Z98.890 S/P LUMBAR LAMINECTOMY: Primary | ICD-10-CM

## 2023-02-20 DIAGNOSIS — R29.898 LEG WEAKNESS, BILATERAL: ICD-10-CM

## 2023-02-20 DIAGNOSIS — R26.89 BALANCE PROBLEM: ICD-10-CM

## 2023-02-20 PROCEDURE — 97140 MANUAL THERAPY 1/> REGIONS: CPT | Performed by: PHYSICAL THERAPIST

## 2023-02-20 PROCEDURE — 97530 THERAPEUTIC ACTIVITIES: CPT | Performed by: PHYSICAL THERAPIST

## 2023-02-20 PROCEDURE — 97110 THERAPEUTIC EXERCISES: CPT | Performed by: PHYSICAL THERAPIST

## 2023-02-20 NOTE — PROGRESS NOTES
Physical Therapy Treatment Note    [facility] [place]  [address], [address]  [place] [address]    Patient: Joy C Sprigler   : 1940  Diagnosis/ICD-10 Code:  S/P lumbar laminectomy [Z98.890]  Referring practitioner: Jairo Wolf MD  Date of Initial Visit: Type: THERAPY  Noted: 2022  Today's Date: 2023  Patient seen for 19 sessions             Subjective     Joy Sprigler reports: She is doing very well and her hip causes her only infrequent pain.      Objective   See Exercise, Manual, and Modality Logs for complete treatment.     Progressed / advanced exercises as noted in flow sheets;      Continued manual therapy with long axis traction to provide distraction on back and hip added STM / IASTM / TPR to lower quads due to increased muscle tension and guarding there.    Added static balance activities.      Assessment/Plan   Continues to report decreasing subjective pain and centralization of symptoms as well as improving tolerance to daily activity.  Improved tolerance with sit to stand and tolerance to daily activity per patient subjective reports. Has not had to use pain medication in several days.    Progress strengthening /stabilization /functional activity as tolerated.  Extend out time between next visit and assess for potential discharge.         Manual Therapy:  10      mins  80178;  Therapeutic Exercise:    25     mins  13097;     Neuromuscular Eliot:        mins  13075;    Therapeutic Activity:     10     mins  93596;     Gait Training:           mins  94693;     Ultrasound:          mins  40964;    Electrical Stimulation:         mins  56756 ( );  Dry Needling          mins self-pay    Timed Treatment:   45   mins   Total Treatment:     45   mins    Filippo Chavez PT  Physical Therapist

## 2023-02-27 NOTE — PROGRESS NOTES
Physical Therapy Treatment Note    [facility] [place]  [address]  [place] [address]    Patient: Joy C Sprigler   : 1940  Diagnosis/ICD-10 Code:  S/P lumbar laminectomy [Z98.890]  Referring practitioner: Jairo Wolf MD  Date of Initial Visit: Type: THERAPY  Noted: 2022  Today's Date: 2023  Patient seen for 19 sessions             Subjective     Joy Sprigler reports: Anterior hip pain is dramatically reduced which she attriibutes to added manual therapy last visit.  She is not having pain upon rising near as much.      Objective   See Exercise, Manual, and Modality Logs for complete treatment.     Progressed / advanced exercises as noted in flow sheets;      Continued manual therapy with long axis traction to provide distraction on back and hip STM / IASTM / TPR to lower quads due to increased muscle tension and guarding there.    Continued static balance activities.      Assessment/Plan   Continues to report decreasing subjective pain and centralization of symptoms as well as improving tolerance to daily activity.  Improved tolerance with sit to stand and tolerance to daily activity per patient subjective reports.  Decreased use of pain medications.    Progress strengthening /stabilization /functional activity as tolerated.  Progress balance activities as tolerated.           Manual Therapy:  10      mins  69075;  Therapeutic Exercise:    25     mins  13760;     Neuromuscular Eliot:        mins  95908;    Therapeutic Activity:     10     mins  97563;     Gait Training:           mins  69872;     Ultrasound:          mins  57466;    Electrical Stimulation:         mins  92262 ( );  Dry Needling          mins self-pay    Timed Treatment:   45   mins   Total Treatment:     45   mins    Filippo Chavez PT  Physical Therapist

## 2023-03-07 ENCOUNTER — HOSPITAL ENCOUNTER (OUTPATIENT)
Dept: MAMMOGRAPHY | Facility: HOSPITAL | Age: 83
Discharge: HOME | End: 2023-03-07
Payer: MEDICARE

## 2023-03-07 ENCOUNTER — APPOINTMENT (OUTPATIENT)
Dept: OTHER | Facility: HOSPITAL | Age: 83
End: 2023-03-07
Payer: MEDICARE

## 2023-03-07 ENCOUNTER — TREATMENT (OUTPATIENT)
Dept: PHYSICAL THERAPY | Facility: CLINIC | Age: 83
End: 2023-03-07
Payer: MEDICARE

## 2023-03-07 DIAGNOSIS — Z00.6 EXAMINATION FOR NORMAL COMPARISON OR CONTROL IN CLINICAL RESEARCH: ICD-10-CM

## 2023-03-07 DIAGNOSIS — Z12.31 SCREENING MAMMOGRAM, ENCOUNTER FOR: ICD-10-CM

## 2023-03-07 DIAGNOSIS — R26.89 BALANCE PROBLEM: ICD-10-CM

## 2023-03-07 DIAGNOSIS — R29.898 LEG WEAKNESS, BILATERAL: ICD-10-CM

## 2023-03-07 DIAGNOSIS — Z98.890 S/P LUMBAR LAMINECTOMY: Primary | ICD-10-CM

## 2023-03-07 PROCEDURE — 77067 SCR MAMMO BI INCL CAD: CPT

## 2023-03-07 PROCEDURE — 77063 BREAST TOMOSYNTHESIS BI: CPT

## 2023-03-07 PROCEDURE — 97530 THERAPEUTIC ACTIVITIES: CPT | Performed by: PHYSICAL THERAPIST

## 2023-03-07 PROCEDURE — 97110 THERAPEUTIC EXERCISES: CPT | Performed by: PHYSICAL THERAPIST

## 2023-03-07 NOTE — PROGRESS NOTES
Physical Therapy Discharge Summary  [facility] [place]  [address], [address]  [place] [address]      Patient: Joy C Sprigler   : 1940  Diagnosis/ICD-10 Code:  S/P lumbar laminectomy [Z98.890]  Referring practitioner: Jairo Wolf MD  Date of Initial Visit: Episode Type: THERAPY  Noted: 2022    Today's Date: 3/7/2023  Patient seen for 21 sessions.      Subjective:     Subjective Questionnaire: Oswestry: 10%   Clinical Progress: improved  Home Program Compliance: Yes  Treatment has included: therapeutic exercise, neuromuscular re-education, manual therapy and therapeutic activity    Subjective     Joy Sprigler reports: She is doing very well and only has occasional minimal pain.  She is performing all her daily activities without hip pain. She is very pleased with her outcome and voices readiness for discharge.    Objective     Postural Observations  Seated posture: good (improved)  Standing posture: good (improved)           Strength/Myotome Testing      Lumbar   Left   Heel walk: normal  Toe walk: normal     Right   Heel walk: normal  Toe walk: normal     Left Hip   Planes of Motion   Flexion: 4+  Extension: 5  Abduction: 5  Adduction: 5     Right Hip   Planes of Motion   Flexion: 5  Extension: 5  Abduction: 5  Adduction: 5     Left Knee   Flexion: 5  Extension: 5     Right Knee   Flexion: 5  Extension: 5     Left Ankle/Foot   Dorsiflexion: 5  Plantar flexion: 5  Great toe extension: 5     Right Ankle/Foot   Dorsiflexion: 5  Plantar flexion: 5  Great toe extension: 5    Additional Strength Details  Unable to toe walk     Lumbar Flexibility Comments:   No notable deficits in B Hamstring flexibility or tightness with B OCTAVIA test. (improved)    Assessment/Plan   Patient is doing very well with normalization of gait, no significant pain and improved lower extremity and core strength.  She is tolerating her daily activities without issues and voices readiness for discharge.  She has been issued  and is independent in home program for self-management of condition.  Recommend discharge from PT at this time.    Progress toward previous goals: Partially Met    Goals    Short-term goals (STG): 5/5  Long-term goals (LTG): 5/6    STG (4 weeks)  1) Independent in home program for lower extremity and core strengthening (MET)   2) Independent in home program for posture correction (MET)   3) Demonstrates basic understanding of condition and role in treatment progression (MET)   4) Tolerates initiation of resistive lower extremity and core strengthening (MET)   5) Demonstrates moderate improvement in tolerance to daily activity per patient subjective reports (MET)     LTG (12 Weeks)  1) Independent in home program for self-management of  condition  (MET)   2) Demonstrates substantial improvement in tolerance to daily activity as evidenced by Oswestry disability score of 8% or less (NOT MET but very near goal)  3) MMT of (B) lower extremities in major planes 4+/5 or greater to allow for safe ambulation and transfers with decreased risk of falls. (MET)   4) Demonstrates MMT for trunk flexion and extension of 4+/5 or greater to allow for improved spinal stability and improved tolerance to activities such as transfers and bending. (MET)   5) Demonstrates good posture in standing and good posture in sitting to improve tolerance to those positions. (MET)   6) Reports ability to return to return to normal daily activities without increasing pain and without mobility or balance issues (MET)         Recommendations: Discharge  PT Signature: Filippo Chavez PT      Timed:         Manual Therapy:         mins  09490;     Therapeutic Exercise:    30     mins  71683;     Neuromuscular Eliot:        mins  34655;    Therapeutic Activity:     10    mins  08063;     Gait Training:           mins  93291;     Ultrasound:          mins  46250;    Ionto                                   mins   05159  Self Care                            mins    71780        Un-Timed:  Electrical Stimulation:         mins  77212 ( );  Dry Needling          mins self-pay  Traction          mins 93266  Low Eval          Mins  74533  Mod Eval          Mins  20981  High Eval                            Mins  78298  Re-Eval                               mins  59144        Timed Treatment:   40   mins   Total Treatment:     40   mins

## 2023-03-22 ENCOUNTER — OFFICE VISIT (OUTPATIENT)
Dept: FAMILY MEDICINE CLINIC | Facility: CLINIC | Age: 83
End: 2023-03-22
Payer: MEDICARE

## 2023-03-22 VITALS
HEART RATE: 74 BPM | OXYGEN SATURATION: 97 % | HEIGHT: 58 IN | BODY MASS INDEX: 25.15 KG/M2 | WEIGHT: 119.8 LBS | SYSTOLIC BLOOD PRESSURE: 118 MMHG | DIASTOLIC BLOOD PRESSURE: 70 MMHG

## 2023-03-22 DIAGNOSIS — I10 PRIMARY HYPERTENSION: ICD-10-CM

## 2023-03-22 DIAGNOSIS — E78.5 HYPERLIPIDEMIA, UNSPECIFIED HYPERLIPIDEMIA TYPE: ICD-10-CM

## 2023-03-22 DIAGNOSIS — Z13.820 ENCOUNTER FOR OSTEOPOROSIS SCREENING IN ASYMPTOMATIC POSTMENOPAUSAL PATIENT: ICD-10-CM

## 2023-03-22 DIAGNOSIS — Z78.0 ENCOUNTER FOR OSTEOPOROSIS SCREENING IN ASYMPTOMATIC POSTMENOPAUSAL PATIENT: ICD-10-CM

## 2023-03-22 DIAGNOSIS — N28.9 LESION OF LEFT NATIVE KIDNEY: ICD-10-CM

## 2023-03-22 DIAGNOSIS — Z00.00 ENCOUNTER FOR SUBSEQUENT ANNUAL WELLNESS VISIT (AWV) IN MEDICARE PATIENT: Primary | ICD-10-CM

## 2023-03-22 PROBLEM — B02.9 SHINGLES: Status: RESOLVED | Noted: 2017-09-11 | Resolved: 2023-03-22

## 2023-03-22 PROBLEM — N13.30 HYDROURETERONEPHROSIS: Status: RESOLVED | Noted: 2020-11-28 | Resolved: 2023-03-22

## 2023-03-22 PROBLEM — N20.1 RIGHT URETERAL STONE: Status: RESOLVED | Noted: 2020-11-27 | Resolved: 2023-03-22

## 2023-03-22 PROCEDURE — 3078F DIAST BP <80 MM HG: CPT | Performed by: INTERNAL MEDICINE

## 2023-03-22 PROCEDURE — G0439 PPPS, SUBSEQ VISIT: HCPCS | Performed by: INTERNAL MEDICINE

## 2023-03-22 PROCEDURE — 1159F MED LIST DOCD IN RCRD: CPT | Performed by: INTERNAL MEDICINE

## 2023-03-22 PROCEDURE — 3074F SYST BP LT 130 MM HG: CPT | Performed by: INTERNAL MEDICINE

## 2023-03-22 PROCEDURE — 1160F RVW MEDS BY RX/DR IN RCRD: CPT | Performed by: INTERNAL MEDICINE

## 2023-03-22 PROCEDURE — 1170F FXNL STATUS ASSESSED: CPT | Performed by: INTERNAL MEDICINE

## 2023-03-22 NOTE — PROGRESS NOTES
The ABCs of the Annual Wellness Visit  Subsequent Medicare Wellness Visit    Subjective      Joy C Sprigler is a 82 y.o. female who presents for a Subsequent Medicare Wellness Visit.  Madiha is here today for her wellness visit.  She seems to be up to speed on most things.  She is not interested in vaccines.  We did discuss those.  She is interested in a bone density test and we will arrange that.  She recently had her back surgery and is doing much better with that.  During the course of the work-up there was a lesion on her kidney that is likely a cyst that has been present on prior CAT scans.  The following portions of the patient's history were reviewed and   updated as appropriate: allergies, current medications, past family history, past medical history, past social history, past surgical history and problem list.    Compared to one year ago, the patient feels her physical   health is better.    Compared to one year ago, the patient feels her mental   health is better.    Recent Hospitalizations:  She was not admitted to the hospital during the last year.       Current Medical Providers:  Patient Care Team:  Alfred Joyner MD as PCP - General  Jairo Wolf MD as Surgeon (Neurosurgery)    Outpatient Medications Prior to Visit   Medication Sig Dispense Refill   • amLODIPine-benazepril (LOTREL 5-10) 5-10 MG per capsule TAKE 1 CAPSULE BY MOUTH DAILY 90 capsule 0   • clotrimazole-betamethasone (Lotrisone) 1-0.05 % cream Apply  topically to the appropriate area as directed Every 12 (Twelve) Hours. (Patient taking differently: Apply  topically to the appropriate area as directed As Needed.) 15 g 0   • ESTRACE VAGINAL 0.1 MG/GM vaginal cream Insert 2 g into the vagina 1 (One) Time Per Week.  3   • ibuprofen (ADVIL,MOTRIN) 200 MG tablet Take 2 tablets by mouth Every 6 (Six) Hours As Needed for Mild Pain.     • Multiple Vitamin (MULTI VITAMIN DAILY PO) Take 1 tablet by mouth Daily. HOLD FOR SURGERY     •  "acetaminophen (TYLENOL) 325 MG tablet Take 2 tablets by mouth Every 4 (Four) Hours As Needed for Mild Pain . 30 tablet 0   • gabapentin (NEURONTIN) 300 MG capsule TAKE 1 CAPSULE BY MOUTH THREE TIMES DAILY (Patient not taking: Reported on 3/22/2023) 90 capsule 5   • HYDROcodone-acetaminophen (NORCO) 5-325 MG per tablet Take 1 tablet by mouth Every 4 (Four) Hours As Needed for Moderate Pain 35 tablet 0   • meloxicam (MOBIC) 15 MG tablet TAKE 1 TABLET BY MOUTH DAILY 90 tablet 0     No facility-administered medications prior to visit.       No opioid medication identified on active medication list. I have reviewed chart for other potential  high risk medication/s and harmful drug interactions in the elderly.          Aspirin is not on active medication list.  Aspirin use is not indicated based on review of current medical condition/s. Risk of harm outweighs potential benefits.  .    Patient Active Problem List   Diagnosis   • Abdominal pain, epigastric   • Abdominal pain, left upper quadrant   • Allergic rhinitis   • HLD (hyperlipidemia)   • HTN (hypertension)   • Back ache   • Tingling   • Stress incontinence in female   • Bartholin's cyst   • Atrophic vaginitis   • Shingles   • Female bladder prolapse   • Right ureteral stone   • Hydroureteronephrosis   • UTI (urinary tract infection)   • Age-related cataract of left eye   • Nuclear cataract   • Spinal stenosis of lumbar region with neurogenic claudication     Advance Care Planning  Advance Directive is not on file.  ACP discussion was held with the patient during this visit. Patient does not have an advance directive, information provided.     Objective    Vitals:    03/22/23 0949   BP: 118/70   BP Location: Left arm   Pulse: 74   SpO2: 97%   Weight: 54.3 kg (119 lb 12.8 oz)   Height: 147.3 cm (57.99\")   PainSc: 0-No pain     Estimated body mass index is 25.05 kg/m² as calculated from the following:    Height as of this encounter: 147.3 cm (57.99\").    Weight as of " this encounter: 54.3 kg (119 lb 12.8 oz).    BMI is >= 25 and <30. (Overweight) The following options were offered after discussion;: weight loss educational material (shared in after visit summary) and exercise counseling/recommendations  Physical Exam  Constitutional:       Appearance: Normal appearance.   Neck:      Vascular: No carotid bruit.   Cardiovascular:      Rate and Rhythm: Normal rate and regular rhythm.      Pulses: Normal pulses.   Pulmonary:      Effort: Pulmonary effort is normal.      Breath sounds: No wheezing or rhonchi.   Abdominal:      General: Bowel sounds are normal.      Palpations: Abdomen is soft.      Comments: She has some minor midepigastric tenderness   Musculoskeletal:      Right lower leg: No edema.      Left lower leg: No edema.   Neurological:      Mental Status: She is alert.           Does the patient have evidence of cognitive impairment?   No            HEALTH RISK ASSESSMENT    Smoking Status:  Social History     Tobacco Use   Smoking Status Never   Smokeless Tobacco Never     Alcohol Consumption:  Social History     Substance and Sexual Activity   Alcohol Use Yes    Comment: rarely     Fall Risk Screen:    [facility], and patient is at MODERATE risk for falls. Assessment completed on:3/22/2023    Depression Screening:  PHQ-2/PHQ-9 Depression Screening 3/22/2023   Little Interest or Pleasure in Doing Things 0-->not at all   Feeling Down, Depressed or Hopeless 0-->not at all   PHQ-9: Brief Depression Severity Measure Score 0       Health Habits and Functional and Cognitive Screening:  Functional & Cognitive Status 3/22/2023   Do you have difficulty preparing food and eating? No   Do you have difficulty bathing yourself, getting dressed or grooming yourself? No   Do you have difficulty using the toilet? No   Do you have difficulty moving around from place to place? No   Do you have trouble with steps or getting out of a bed or a chair? No    Current Diet Well Balanced Diet   Dental Exam Up to date   Eye Exam Up to date   Do you need help using the phone?  No   Are you deaf or do you have serious difficulty hearing?  Yes   Do you need help with transportation? No   Do you need help shopping? No   Do you need help preparing meals?  No   Do you need help with housework?  No   Do you need help with laundry? No   Do you need help taking your medications? No   Do you need help managing money? No   Do you ever drive or ride in a car without wearing a seat belt? No   Have you felt unusual stress, anger or loneliness in the last month? No   Who do you live with? Alone   If you need help, do you have trouble finding someone available to you? No   Do you have difficulty concentrating, remembering or making decisions? No       Age-appropriate Screening Schedule:  Refer to the list below for future screening recommendations based on patient's age, sex and/or medical conditions. Orders for these recommended tests are listed in the plan section. The patient has been provided with a written plan.    Health Maintenance   Topic Date Due   • DXA SCAN  Never done   • ZOSTER VACCINE (1 of 2) Never done   • Pneumococcal Vaccine 65+ (1 - PCV) Never done   • ANNUAL WELLNESS VISIT  Never done   • LIPID PANEL  06/10/2020   • COVID-19 Vaccine (3 - Booster for Pfizer series) 03/24/2023 (Originally 4/14/2021)   • INFLUENZA VACCINE  03/31/2023 (Originally 8/1/2022)   • MAMMOGRAM  03/07/2025   • TDAP/TD VACCINES (2 - Td or Tdap) 07/22/2030                CMS Preventative Services Quick Reference  Risk Factors Identified During Encounter:    Immunizations Discussed/Encouraged: Influenza, Prevnar 20 (Pneumococcal 20-valent conjugate), Shingrix and COVID19    The above risks/problems have been discussed with the patient.  Pertinent information has been shared with the patient in the After Visit Summary.    Diagnoses and all orders for this visit:    1. Encounter for subsequent annual  wellness visit (AWV) in Medicare patient (Primary)    2. Lesion of left native kidney  -     US Renal Bilateral; Future    3. Encounter for osteoporosis screening in asymptomatic postmenopausal patient  -     DEXA Bone Density Axial; Future    4. Hyperlipidemia, unspecified hyperlipidemia type  -     Lipid Panel    5. Primary hypertension  -     CBC & Differential  -     Comprehensive Metabolic Panel    Madiha is here today for her wellness visit.  She is up to speed on most things.  She is not interested in vaccines.  We are going to schedule a bone density test.  We will do an ultrasound of her kidney although I really suspect that is just a prior cyst that we have seen on her kidney on CT scan.    Follow Up:   Next Medicare Wellness visit to be scheduled in 1 year.      An After Visit Summary and PPPS were made available to the patient.

## 2023-03-22 NOTE — PATIENT INSTRUCTIONS
Medicare Wellness  Personal Prevention Plan of Service     Date of Office Visit:    Encounter Provider:  Alfred Joyner MD  Place of Service:  [facility]  Patient Name: Joy C Sprigler  :  1940    As part of the Medicare Wellness portion of your visit today, we are providing you with this personalized preventive plan of services (PPPS). This plan is based upon recommendations of the [place] Preventive Services Task Force (USPSTF) and the Advisory Committee on Immunization Practices (ACIP).    This lists the preventive care services that should be considered, and provides dates of when you are due. Items listed as completed are up-to-date and do not require any further intervention.    Health Maintenance   Topic Date Due    DXA SCAN  Never done    ZOSTER VACCINE (1 of 2) Never done    Pneumococcal Vaccine 65+ (1 - PCV) Never done    ANNUAL WELLNESS VISIT  Never done    LIPID PANEL  06/10/2020    COVID-19 Vaccine (3 - Booster for Pfizer series) 2023 (Originally 2021)    INFLUENZA VACCINE  2023 (Originally 2022)    MAMMOGRAM  2025    TDAP/TD VACCINES (2 - Td or Tdap) 2030       Orders Placed This Encounter   Procedures    US Renal Bilateral     Standing Status:   Future     Standing Expiration Date:   3/22/2024     Order Specific Question:   Reason for Exam:     Answer:   left kidney lesion seen on mri lumbar spine, prior cysts     Order Specific Question:   Release to patient     Answer:   Routine Release    DEXA Bone Density Axial     Standing Status:   Future     Standing Expiration Date:   3/22/2024     Order Specific Question:   Reason for Exam:     Answer:   postmenopausal screening    Comprehensive Metabolic Panel     Order Specific Question:   Release to patient     Answer:   Routine Release    Lipid Panel    CBC & Differential     Order Specific Question:   Manual Differential     Answer:   No       No follow-ups on  file.

## 2023-03-23 LAB
ALBUMIN SERPL-MCNC: 4.9 G/DL (ref 3.5–5.2)
ALBUMIN/GLOB SERPL: 2.1 G/DL
ALP SERPL-CCNC: 86 U/L (ref 39–117)
ALT SERPL-CCNC: 14 U/L (ref 1–33)
AST SERPL-CCNC: 16 U/L (ref 1–32)
BASOPHILS # BLD AUTO: 0.05 10*3/MM3 (ref 0–0.2)
BASOPHILS NFR BLD AUTO: 0.6 % (ref 0–1.5)
BILIRUB SERPL-MCNC: 0.6 MG/DL (ref 0–1.2)
BUN SERPL-MCNC: 12 MG/DL (ref 8–23)
BUN/CREAT SERPL: 19.7 (ref 7–25)
CALCIUM SERPL-MCNC: 9.9 MG/DL (ref 8.6–10.5)
CHLORIDE SERPL-SCNC: 103 MMOL/L (ref 98–107)
CHOLEST SERPL-MCNC: 199 MG/DL (ref 0–200)
CO2 SERPL-SCNC: 26.8 MMOL/L (ref 22–29)
CREAT SERPL-MCNC: 0.61 MG/DL (ref 0.57–1)
EGFRCR SERPLBLD CKD-EPI 2021: 88.3 ML/MIN/1.73
EOSINOPHIL # BLD AUTO: 0.14 10*3/MM3 (ref 0–0.4)
EOSINOPHIL NFR BLD AUTO: 1.7 % (ref 0.3–6.2)
ERYTHROCYTE [DISTWIDTH] IN BLOOD BY AUTOMATED COUNT: 13.1 % (ref 12.3–15.4)
GLOBULIN SER CALC-MCNC: 2.3 GM/DL
GLUCOSE SERPL-MCNC: 92 MG/DL (ref 65–99)
HCT VFR BLD AUTO: 42.7 % (ref 34–46.6)
HDLC SERPL-MCNC: 53 MG/DL (ref 40–60)
HGB BLD-MCNC: 14.3 G/DL (ref 12–15.9)
IMM GRANULOCYTES # BLD AUTO: 0.03 10*3/MM3 (ref 0–0.05)
IMM GRANULOCYTES NFR BLD AUTO: 0.4 % (ref 0–0.5)
LDLC SERPL CALC-MCNC: 125 MG/DL (ref 0–100)
LYMPHOCYTES # BLD AUTO: 2.26 10*3/MM3 (ref 0.7–3.1)
LYMPHOCYTES NFR BLD AUTO: 27.6 % (ref 19.6–45.3)
MCH RBC QN AUTO: 29 PG (ref 26.6–33)
MCHC RBC AUTO-ENTMCNC: 33.5 G/DL (ref 31.5–35.7)
MCV RBC AUTO: 86.6 FL (ref 79–97)
MONOCYTES # BLD AUTO: 0.63 10*3/MM3 (ref 0.1–0.9)
MONOCYTES NFR BLD AUTO: 7.7 % (ref 5–12)
NEUTROPHILS # BLD AUTO: 5.08 10*3/MM3 (ref 1.7–7)
NEUTROPHILS NFR BLD AUTO: 62 % (ref 42.7–76)
NRBC BLD AUTO-RTO: 0 /100 WBC (ref 0–0.2)
PLATELET # BLD AUTO: 373 10*3/MM3 (ref 140–450)
POTASSIUM SERPL-SCNC: 4.5 MMOL/L (ref 3.5–5.2)
PROT SERPL-MCNC: 7.2 G/DL (ref 6–8.5)
RBC # BLD AUTO: 4.93 10*6/MM3 (ref 3.77–5.28)
SODIUM SERPL-SCNC: 142 MMOL/L (ref 136–145)
TRIGL SERPL-MCNC: 118 MG/DL (ref 0–150)
VLDLC SERPL CALC-MCNC: 21 MG/DL (ref 5–40)
WBC # BLD AUTO: 8.19 10*3/MM3 (ref 3.4–10.8)

## 2023-03-24 RX ORDER — AMLODIPINE BESYLATE AND BENAZEPRIL HYDROCHLORIDE 5; 10 MG/1; MG/1
CAPSULE ORAL
Qty: 90 CAPSULE | Refills: 0 | Status: SHIPPED | OUTPATIENT
Start: 2023-03-24

## 2023-03-29 ENCOUNTER — HOSPITAL ENCOUNTER (OUTPATIENT)
Dept: ULTRASOUND IMAGING | Facility: HOSPITAL | Age: 83
Discharge: HOME | End: 2023-03-29
Payer: MEDICARE

## 2023-03-29 ENCOUNTER — HOSPITAL ENCOUNTER (OUTPATIENT)
Dept: BONE DENSITY | Facility: HOSPITAL | Age: 83
Discharge: HOME | End: 2023-03-29
Payer: MEDICARE

## 2023-03-29 DIAGNOSIS — N28.9 LESION OF LEFT NATIVE KIDNEY: ICD-10-CM

## 2023-03-29 DIAGNOSIS — Z78.0 ENCOUNTER FOR OSTEOPOROSIS SCREENING IN ASYMPTOMATIC POSTMENOPAUSAL PATIENT: ICD-10-CM

## 2023-03-29 DIAGNOSIS — Z13.820 ENCOUNTER FOR OSTEOPOROSIS SCREENING IN ASYMPTOMATIC POSTMENOPAUSAL PATIENT: ICD-10-CM

## 2023-03-29 PROCEDURE — 77080 DXA BONE DENSITY AXIAL: CPT

## 2023-03-29 PROCEDURE — 76775 US EXAM ABDO BACK WALL LIM: CPT

## 2023-03-29 RX ORDER — ALENDRONATE SODIUM 70 MG/1
70 TABLET ORAL
Qty: 13 TABLET | Refills: 3 | Status: SHIPPED | OUTPATIENT
Start: 2023-03-29

## 2023-04-03 RX ORDER — MELOXICAM 15 MG/1
15 TABLET ORAL DAILY
Qty: 90 TABLET | Refills: 0 | Status: SHIPPED | OUTPATIENT
Start: 2023-04-03

## 2023-05-11 ENCOUNTER — TELEPHONE (OUTPATIENT)
Dept: FAMILY MEDICINE CLINIC | Facility: CLINIC | Age: 83
End: 2023-05-11
Payer: MEDICARE

## 2023-05-11 RX ORDER — GLYCOPYRROLATE 1 MG/1
1 TABLET ORAL 2 TIMES DAILY
Qty: 20 TABLET | Refills: 0 | Status: SHIPPED | OUTPATIENT
Start: 2023-05-11

## 2023-05-22 ENCOUNTER — LAB (OUTPATIENT)
Dept: LAB | Facility: HOSPITAL | Age: 83
End: 2023-05-22
Payer: MEDICARE

## 2023-05-22 ENCOUNTER — OFFICE VISIT (OUTPATIENT)
Dept: FAMILY MEDICINE CLINIC | Facility: CLINIC | Age: 83
End: 2023-05-22
Payer: MEDICARE

## 2023-05-22 VITALS
SYSTOLIC BLOOD PRESSURE: 120 MMHG | HEART RATE: 86 BPM | RESPIRATION RATE: 18 BRPM | OXYGEN SATURATION: 98 % | DIASTOLIC BLOOD PRESSURE: 62 MMHG | WEIGHT: 118 LBS | BODY MASS INDEX: 24.77 KG/M2 | TEMPERATURE: 97.7 F | HEIGHT: 58 IN

## 2023-05-22 DIAGNOSIS — K51.519 LEFT SIDED COLITIS WITH UNSPECIFIED COMPLICATIONS: ICD-10-CM

## 2023-05-22 DIAGNOSIS — R10.32 BILATERAL LOWER ABDOMINAL DISCOMFORT: ICD-10-CM

## 2023-05-22 DIAGNOSIS — R19.7 DIARRHEA, UNSPECIFIED TYPE: Primary | ICD-10-CM

## 2023-05-22 DIAGNOSIS — N81.10 FEMALE BLADDER PROLAPSE: Primary | ICD-10-CM

## 2023-05-22 DIAGNOSIS — R19.7 DIARRHEA, UNSPECIFIED TYPE: ICD-10-CM

## 2023-05-22 DIAGNOSIS — R10.31 BILATERAL LOWER ABDOMINAL DISCOMFORT: ICD-10-CM

## 2023-05-22 PROCEDURE — 3074F SYST BP LT 130 MM HG: CPT | Performed by: INTERNAL MEDICINE

## 2023-05-22 PROCEDURE — 3078F DIAST BP <80 MM HG: CPT | Performed by: INTERNAL MEDICINE

## 2023-05-22 PROCEDURE — 99214 OFFICE O/P EST MOD 30 MIN: CPT | Performed by: INTERNAL MEDICINE

## 2023-05-22 PROCEDURE — 87507 IADNA-DNA/RNA PROBE TQ 12-25: CPT

## 2023-05-22 PROCEDURE — 83630 LACTOFERRIN FECAL (QUAL): CPT

## 2023-05-22 NOTE — PROGRESS NOTES
Subjective Chief complaint is diarrhea  Joy C Sprigler is a 82 y.o. female.     History of Present Illness Madiha is here today for complaints of diarrhea.  Initially this was just after eating.  It would seem like things would run straight through her.  Now for the last 2 days she has had just large volumes of diarrhea in the morning.  She is not having fever or chills.  She is not having blood in the bowel movements.  She does have some lower abdominal discomfort.  She feels like her bladder may be falling out.  It is uncomfortable to sit at times.  She has some urinary issues along with that.  I did review a CT scan of her abdomen from 2020.  There is no evidence of colitis.  Her pancreas looked okay.    The following portions of the patient's history were reviewed and updated as appropriate: allergies, current medications, past family history, past medical history, past social history, past surgical history and problem list.    Review of Systems   Constitutional: Negative for chills and fever.   Respiratory: Negative for chest tightness and shortness of breath.    Gastrointestinal: Positive for diarrhea. Negative for anal bleeding, blood in stool, GERD and indigestion.   Genitourinary: Positive for pelvic pain and pelvic pressure.       Objective   Physical Exam  Vitals and nursing note reviewed.   Constitutional:       Appearance: Normal appearance.   Cardiovascular:      Rate and Rhythm: Normal rate and regular rhythm.      Heart sounds: No murmur heard.    No friction rub. No gallop.   Pulmonary:      Effort: Pulmonary effort is normal.      Breath sounds: No wheezing, rhonchi or rales.   Abdominal:      Comments: She has some fairly diffuse lower quadrant tenderness.  No rebound or guarding   Neurological:      Mental Status: She is alert.           Assessment & Plan   Diagnoses and all orders for this visit:    1. Female bladder prolapse (Primary)  -     Urinalysis With Microscopic - Urine, Clean Catch  -      Urine Culture - Urine, Urine, Clean Catch  -     Ambulatory Referral to Gynecologic Urology    2. Diarrhea, unspecified type  -     CBC & Differential  -     Comprehensive Metabolic Panel  -     Urinalysis With Microscopic - Urine, Clean Catch  -     Urine Culture - Urine, Urine, Clean Catch  -     Amylase  -     Lipase  -     Celiac Comprehensive Panel  -     Cancel: Fecal Lactoferrin Qual. - Stool, Per Rectum; Future  -     Enteric Bacterial Panel - Stool, Per Rectum; Future  -     Enteric Parasite Panel - Stool, Per Rectum; Future  -     Fecal Lactoferrin Qual. - Stool, Per Rectum; Future    3. Bilateral lower abdominal discomfort  -     CBC & Differential  -     Comprehensive Metabolic Panel  -     Urinalysis With Microscopic - Urine, Clean Catch  -     Urine Culture - Urine, Urine, Clean Catch      Madiha is here today for diarrhea.  She also has some bladder prolapse symptoms.  We are going to check some lab work today and order some stool specimens.  I am going to have her see a urogynecologist.

## 2023-05-23 LAB
ADV 40+41 DNA STL QL NAA+NON-PROBE: NOT DETECTED
ASTRO TYP 1-8 RNA STL QL NAA+NON-PROBE: NOT DETECTED
C CAYETANENSIS DNA STL QL NAA+NON-PROBE: NOT DETECTED
C COLI+JEJ+UPSA DNA STL QL NAA+NON-PROBE: NOT DETECTED
CRYPTOSP DNA STL QL NAA+NON-PROBE: NOT DETECTED
E HISTOLYT DNA STL QL NAA+NON-PROBE: NOT DETECTED
EAEC PAA PLAS AGGR+AATA ST NAA+NON-PRB: NOT DETECTED
EC STX1+STX2 GENES STL QL NAA+NON-PROBE: NOT DETECTED
EPEC EAE GENE STL QL NAA+NON-PROBE: NOT DETECTED
ETEC LTA+ST1A+ST1B TOX ST NAA+NON-PROBE: NOT DETECTED
G LAMBLIA DNA STL QL NAA+NON-PROBE: NOT DETECTED
LACTOFERRIN STL QL LA: POSITIVE
NOROVIRUS GI+II RNA STL QL NAA+NON-PROBE: NOT DETECTED
P SHIGELLOIDES DNA STL QL NAA+NON-PROBE: NOT DETECTED
RVA RNA STL QL NAA+NON-PROBE: NOT DETECTED
S ENT+BONG DNA STL QL NAA+NON-PROBE: NOT DETECTED
SAPO I+II+IV+V RNA STL QL NAA+NON-PROBE: NOT DETECTED
SHIGELLA SP+EIEC IPAH ST NAA+NON-PROBE: NOT DETECTED
V CHOL+PARA+VUL DNA STL QL NAA+NON-PROBE: NOT DETECTED
V CHOLERAE DNA STL QL NAA+NON-PROBE: NOT DETECTED
Y ENTEROCOL DNA STL QL NAA+NON-PROBE: NOT DETECTED

## 2023-05-24 LAB
ALBUMIN SERPL-MCNC: 4.5 G/DL (ref 3.5–5.2)
ALBUMIN/GLOB SERPL: 1.7 G/DL
ALP SERPL-CCNC: 84 U/L (ref 39–117)
ALT SERPL-CCNC: 14 U/L (ref 1–33)
AMYLASE SERPL-CCNC: 77 U/L (ref 28–100)
AST SERPL-CCNC: 14 U/L (ref 1–32)
BACTERIA UR CULT: NORMAL
BACTERIA UR CULT: NORMAL
BASOPHILS # BLD AUTO: 0.06 10*3/MM3 (ref 0–0.2)
BASOPHILS NFR BLD AUTO: 0.5 % (ref 0–1.5)
BILIRUB SERPL-MCNC: 0.5 MG/DL (ref 0–1.2)
BUN SERPL-MCNC: 14 MG/DL (ref 8–23)
BUN/CREAT SERPL: 20 (ref 7–25)
CALCIUM SERPL-MCNC: 10.3 MG/DL (ref 8.6–10.5)
CHLORIDE SERPL-SCNC: 103 MMOL/L (ref 98–107)
CO2 SERPL-SCNC: 27.3 MMOL/L (ref 22–29)
CREAT SERPL-MCNC: 0.7 MG/DL (ref 0.57–1)
EGFRCR SERPLBLD CKD-EPI 2021: 85.4 ML/MIN/1.73
ENDOMYSIUM IGA SER QL: NEGATIVE
EOSINOPHIL # BLD AUTO: 0.1 10*3/MM3 (ref 0–0.4)
EOSINOPHIL NFR BLD AUTO: 0.8 % (ref 0.3–6.2)
ERYTHROCYTE [DISTWIDTH] IN BLOOD BY AUTOMATED COUNT: 13 % (ref 12.3–15.4)
GLIADIN PEPTIDE IGA SER-ACNC: 14 UNITS (ref 0–19)
GLIADIN PEPTIDE IGG SER-ACNC: 3 UNITS (ref 0–19)
GLOBULIN SER CALC-MCNC: 2.6 GM/DL
GLUCOSE SERPL-MCNC: 95 MG/DL (ref 65–99)
HCT VFR BLD AUTO: 41.9 % (ref 34–46.6)
HGB BLD-MCNC: 13.8 G/DL (ref 12–15.9)
IGA SERPL-MCNC: 358 MG/DL (ref 64–422)
IMM GRANULOCYTES # BLD AUTO: 0.04 10*3/MM3 (ref 0–0.05)
IMM GRANULOCYTES NFR BLD AUTO: 0.3 % (ref 0–0.5)
LIPASE SERPL-CCNC: 22 U/L (ref 13–60)
LYMPHOCYTES # BLD AUTO: 2.42 10*3/MM3 (ref 0.7–3.1)
LYMPHOCYTES NFR BLD AUTO: 20 % (ref 19.6–45.3)
MCH RBC QN AUTO: 28.5 PG (ref 26.6–33)
MCHC RBC AUTO-ENTMCNC: 32.9 G/DL (ref 31.5–35.7)
MCV RBC AUTO: 86.4 FL (ref 79–97)
MONOCYTES # BLD AUTO: 0.98 10*3/MM3 (ref 0.1–0.9)
MONOCYTES NFR BLD AUTO: 8.1 % (ref 5–12)
NEUTROPHILS # BLD AUTO: 8.53 10*3/MM3 (ref 1.7–7)
NEUTROPHILS NFR BLD AUTO: 70.3 % (ref 42.7–76)
NRBC BLD AUTO-RTO: 0 /100 WBC (ref 0–0.2)
PLATELET # BLD AUTO: 464 10*3/MM3 (ref 140–450)
POTASSIUM SERPL-SCNC: 3.5 MMOL/L (ref 3.5–5.2)
PROT SERPL-MCNC: 7.1 G/DL (ref 6–8.5)
RBC # BLD AUTO: 4.85 10*6/MM3 (ref 3.77–5.28)
SODIUM SERPL-SCNC: 143 MMOL/L (ref 136–145)
TTG IGA SER-ACNC: <2 U/ML (ref 0–3)
TTG IGG SER-ACNC: <2 U/ML (ref 0–5)
WBC # BLD AUTO: 12.13 10*3/MM3 (ref 3.4–10.8)

## 2023-05-24 RX ORDER — METRONIDAZOLE 250 MG/1
500 TABLET ORAL 2 TIMES DAILY
Qty: 14 TABLET | Refills: 0 | Status: SHIPPED | OUTPATIENT
Start: 2023-05-24

## 2023-06-07 DIAGNOSIS — R19.7 DIARRHEA, UNSPECIFIED TYPE: Primary | ICD-10-CM

## 2023-06-16 RX ORDER — AMLODIPINE BESYLATE AND BENAZEPRIL HYDROCHLORIDE 5; 10 MG/1; MG/1
CAPSULE ORAL
Qty: 90 CAPSULE | Refills: 0 | Status: SHIPPED | OUTPATIENT
Start: 2023-06-16

## 2023-06-29 ENCOUNTER — TELEPHONE (OUTPATIENT)
Dept: GASTROENTEROLOGY | Facility: CLINIC | Age: 83
End: 2023-06-29

## 2023-06-29 NOTE — TELEPHONE ENCOUNTER
Caller: Sprigler, Joy C    Relationship to patient: Self    Best call back number: [phone]    Patient is needing: PT RETURNING MISSED PHONE CALL FROM A FEW MINUTES AGO, SHE WASN'T SURE WHAT IT WAS ABOUT BUT WOULD LIKE A CALL BACK.

## 2023-08-02 ENCOUNTER — HOSPITAL ENCOUNTER (OUTPATIENT)
Dept: CT IMAGING | Facility: HOSPITAL | Age: 83
Discharge: HOME | End: 2023-08-02
Admitting: INTERNAL MEDICINE
Payer: MEDICARE

## 2023-08-02 DIAGNOSIS — R19.4 CHANGE IN BOWEL HABITS: ICD-10-CM

## 2023-08-02 DIAGNOSIS — R63.4 WEIGHT LOSS: ICD-10-CM

## 2023-08-02 DIAGNOSIS — R19.7 DIARRHEA, UNSPECIFIED TYPE: ICD-10-CM

## 2023-08-02 LAB
CREAT BLDA-MCNC: 0.7 MG/DL (ref 0.6–1.3)
EGFRCR SERPLBLD CKD-EPI 2021: 85.4 ML/MIN/1.73

## 2023-08-02 PROCEDURE — 25510000001 IOPAMIDOL PER 1 ML: Performed by: INTERNAL MEDICINE

## 2023-08-02 PROCEDURE — 82565 ASSAY OF CREATININE: CPT

## 2023-08-02 PROCEDURE — 74178 CT ABD&PLV WO CNTR FLWD CNTR: CPT

## 2023-08-02 RX ADMIN — IOPAMIDOL 100 ML: 755 INJECTION, SOLUTION INTRAVENOUS at 13:00

## 2023-08-11 ENCOUNTER — TELEPHONE (OUTPATIENT)
Dept: GASTROENTEROLOGY | Facility: CLINIC | Age: 83
End: 2023-08-11
Payer: MEDICARE

## 2023-08-11 NOTE — TELEPHONE ENCOUNTER
----- Message from Anthony De MD sent at 8/10/2023  6:54 PM EDT -----  08/10/23       I called her with the results of this CAT scan of the abdomen and pelvis.  Please send a copy of this report to Dr. Joyner.  Thx. kj

## 2023-09-18 RX ORDER — AMLODIPINE BESYLATE AND BENAZEPRIL HYDROCHLORIDE 5; 10 MG/1; MG/1
CAPSULE ORAL
Qty: 90 CAPSULE | Refills: 0 | Status: SHIPPED | OUTPATIENT
Start: 2023-09-18

## 2023-09-20 ENCOUNTER — OFFICE VISIT (OUTPATIENT)
Dept: FAMILY MEDICINE CLINIC | Facility: CLINIC | Age: 83
End: 2023-09-20
Payer: MEDICARE

## 2023-09-20 VITALS
DIASTOLIC BLOOD PRESSURE: 70 MMHG | HEIGHT: 58 IN | RESPIRATION RATE: 16 BRPM | HEART RATE: 70 BPM | SYSTOLIC BLOOD PRESSURE: 112 MMHG | OXYGEN SATURATION: 99 % | WEIGHT: 116 LBS | BODY MASS INDEX: 24.35 KG/M2

## 2023-09-20 DIAGNOSIS — I10 PRIMARY HYPERTENSION: Primary | ICD-10-CM

## 2023-09-20 PROCEDURE — 3074F SYST BP LT 130 MM HG: CPT | Performed by: INTERNAL MEDICINE

## 2023-09-20 PROCEDURE — 99213 OFFICE O/P EST LOW 20 MIN: CPT | Performed by: INTERNAL MEDICINE

## 2023-09-20 PROCEDURE — 3078F DIAST BP <80 MM HG: CPT | Performed by: INTERNAL MEDICINE

## 2023-09-20 NOTE — PROGRESS NOTES
Subjective chief complaint follow-up on blood pressure  Joy C Sprigler is a 82 y.o. female.     History of Present Illness Madiha is here today for follow-up on her blood pressure.  That seems to be doing well on her current dose.  She is not taking any other medicines right now.  She is still battling some with diarrhea.  It is not as uncontrolled as it was.  It is manageable at the current time.  Her back is basically doing okay.    The following portions of the patient's history were reviewed and updated as appropriate: allergies, current medications, and problem list.    Review of Systems   Constitutional:  Negative for chills and fever.   Respiratory:  Negative for chest tightness and shortness of breath.    Gastrointestinal:  Positive for diarrhea.     Objective   Physical Exam  Vitals and nursing note reviewed.   Constitutional:       Appearance: Normal appearance.   Cardiovascular:      Rate and Rhythm: Normal rate and regular rhythm.      Pulses: Normal pulses.   Pulmonary:      Effort: Pulmonary effort is normal.      Breath sounds: No wheezing, rhonchi or rales.   Abdominal:      General: Bowel sounds are normal.      Palpations: Abdomen is soft.      Tenderness: There is no abdominal tenderness. There is no guarding or rebound.   Neurological:      Mental Status: She is alert.       Assessment & Plan   Diagnoses and all orders for this visit:    1. Primary hypertension (Primary)      Brianne is here today for her blood pressure.  That seems to be doing well on her current dose.  We are not going to make any changes.  I can see her back in 6 months.

## 2023-10-11 ENCOUNTER — OFFICE VISIT (OUTPATIENT)
Dept: GASTROENTEROLOGY | Facility: CLINIC | Age: 83
End: 2023-10-11
Payer: MEDICARE

## 2023-10-11 VITALS
BODY MASS INDEX: 24.35 KG/M2 | DIASTOLIC BLOOD PRESSURE: 77 MMHG | HEIGHT: 58 IN | TEMPERATURE: 96.8 F | OXYGEN SATURATION: 98 % | WEIGHT: 116 LBS | HEART RATE: 70 BPM | SYSTOLIC BLOOD PRESSURE: 148 MMHG

## 2023-10-11 DIAGNOSIS — R19.7 DIARRHEA, UNSPECIFIED TYPE: Primary | ICD-10-CM

## 2023-10-11 DIAGNOSIS — R63.4 WEIGHT LOSS: ICD-10-CM

## 2023-10-11 PROCEDURE — 1159F MED LIST DOCD IN RCRD: CPT | Performed by: INTERNAL MEDICINE

## 2023-10-11 PROCEDURE — 3077F SYST BP >= 140 MM HG: CPT | Performed by: INTERNAL MEDICINE

## 2023-10-11 PROCEDURE — 99213 OFFICE O/P EST LOW 20 MIN: CPT | Performed by: INTERNAL MEDICINE

## 2023-10-11 PROCEDURE — 1160F RVW MEDS BY RX/DR IN RCRD: CPT | Performed by: INTERNAL MEDICINE

## 2023-10-11 PROCEDURE — 3078F DIAST BP <80 MM HG: CPT | Performed by: INTERNAL MEDICINE

## 2023-10-11 NOTE — PROGRESS NOTES
Chief Complaint   Patient presents with    CT scan follow up    Diarrhea       History of Present Illness:   82 y.o. female who I last saw in 7 of 2023.  My assessment and plan at that time was as follows:  Assessment:  1. Diarrhea and now soft BM's  2. Weight loss that has stabilized.  3.      Recommendations:  1. TSH  2. CT abd/pelvis  3. She prefers no colonoscopy given her age.   4. F/u 3 mos       Her last colonoscopy was in 12/16.          On 8/2/23 she had a CT of the abdomen and pelvis with IV contrast that showed:  Impression:  1.No acute abnormality identified within the abdomen or pelvis.  2.Unremarkable CT appearance of the pancreas.  3.Chronic-appearing compression fractures of T8, T10, T12, and L4. Please correlate clinically for back pain/tenderness at these sites.  4.Additional findings as detailed above.     Electronically Signed: Vinay Ledesma    8/2/2023 1:16 PM EDT    Workstation ID: [id number]       Some good days and bad days. On bad days she has soft BM, occasional runny BM's. No rectal bleeding or melena. No chest pain. NO abdominal pain. NO nausea or vomtiing. Her weight has remained stable. She has 2-3 BM/day.        She had back surgery in 11/22 and has done well.    Past Medical History:   Diagnosis Date    Abrasion of skin     WELPS ON LOWER ABDOMEN AND LOWER BACK LIKELY DUE TO ALLERGIC REACTION FROM HYDROCODONE    Arthritis     Female bladder prolapse     Hydroureteronephrosis 11/28/2020    Hyperlipemia     Pt denies    Hypertension     Kidney stone     Lower back pain 11/15/2022    Right ureteral stone 11/27/2020    Shingles 9/11/2017       Past Surgical History:   Procedure Laterality Date    CARPAL TUNNEL RELEASE      COLONOSCOPY N/A 12/02/2016    Procedure: COLONOSCOPY TO CECUM/TI;  Surgeon: Anthony De MD;  Location: [facility] ENDOSCOPY;  Service:     CYSTOSCOPY W/ URETERAL STENT PLACEMENT Right 11/28/2020    Procedure: RIGHT URETERAL STONE EXTRACTION WITH LITHOPAXY OF STONE AND STENT  PLACEMENT;  Surgeon: Enrique Youssef MD;  Location: [facility] MAIN OR;  Service: Urology;  Laterality: Right;    ENDOSCOPY N/A 12/02/2016    Procedure: ESOPHAGOGASTRODUODENOSCOPY with biopsy;  Surgeon: Anthony De MD;  Location: [facility] ENDOSCOPY;  Service:     LUMBAR DISCECTOMY N/A 11/21/2022    Procedure: Lumbar 4 to lumbar 5 laminectomy with metrx;  Surgeon: Jairo Wolf MD;  Location: [facility] MAIN OR;  Service: Neurosurgery;  Laterality: N/A;         Current Outpatient Medications:     amLODIPine-benazepril (LOTREL 5-10) 5-10 MG per capsule, TAKE 1 CAPSULE BY MOUTH DAILY, Disp: 90 capsule, Rfl: 0    CALCIUM PO, Take  by mouth., Disp: , Rfl:     ESTRACE VAGINAL 0.1 MG/GM vaginal cream, Insert 2 g into the vagina 1 (One) Time Per Week., Disp: , Rfl: 3    ibuprofen (ADVIL,MOTRIN) 200 MG tablet, Take 2 tablets by mouth Every 6 (Six) Hours As Needed for Mild Pain., Disp: , Rfl:     Multiple Vitamin (MULTI VITAMIN DAILY PO), Take 1 tablet by mouth Daily. HOLD FOR SURGERY, Disp: , Rfl:     No Known Allergies    Family History   Problem Relation Age of Onset    Heart disease Mother     Heart disease Father     Heart disease Brother     Cancer Brother         tongue, prostate    Heart disease Brother     Heart disease Brother     Heart disease Brother     Heart disease Brother     Malig Hyperthermia Neg Hx        Social History     Socioeconomic History    Marital status:    Tobacco Use    Smoking status: Never    Smokeless tobacco: Never   Vaping Use    Vaping Use: Never used   Substance and Sexual Activity    Alcohol use: Yes     Comment: rarely    Drug use: No    Sexual activity: Defer       Review of Systems   Gastrointestinal:  Positive for diarrhea. Negative for abdominal pain.   All other systems reviewed and are negative.    Pertinent positives and negatives documented in the HPI and all other systems reviewed and were found to be negative.  Vitals:    10/11/23 1016   BP: 148/77   Pulse: 70   Temp:  96.8 øF (36 øC)   SpO2: 98%       Physical Exam  Vitals reviewed.   Constitutional:       General: She is not in acute distress.     Appearance: Normal appearance. She is well-developed. She is not diaphoretic.   HENT:      Head: Normocephalic and atraumatic. Hair is normal.      Right Ear: Hearing, tympanic membrane, ear canal and external ear normal. No decreased hearing noted. No drainage.      Left Ear: Hearing, tympanic membrane, ear canal and external ear normal. No decreased hearing noted.      Nose: Nose normal. No nasal deformity.      Mouth/Throat:      Mouth: Mucous membranes are moist.   Eyes:      General: Lids are normal.         Right eye: No discharge.         Left eye: No discharge.      Extraocular Movements: Extraocular movements intact.      Conjunctiva/sclera: Conjunctivae normal.      Pupils: Pupils are equal, round, and reactive to light.   Neck:      Thyroid: No thyromegaly.      Vascular: No JVD.      Trachea: No tracheal deviation.   Cardiovascular:      Rate and Rhythm: Normal rate and regular rhythm.      Pulses: Normal pulses.      Heart sounds: Normal heart sounds. No murmur heard.     No friction rub. No gallop.   Pulmonary:      Effort: Pulmonary effort is normal. No respiratory distress.      Breath sounds: Normal breath sounds. No wheezing or rales.   Chest:      Chest wall: No tenderness.   Abdominal:      General: Bowel sounds are normal. There is no distension.      Palpations: Abdomen is soft. There is no mass.      Tenderness: There is no abdominal tenderness. There is no guarding or rebound.      Hernia: No hernia is present.   Musculoskeletal:         General: No tenderness or deformity. Normal range of motion.      Cervical back: Normal range of motion and neck supple.   Lymphadenopathy:      Cervical: No cervical adenopathy.   Skin:     General: Skin is warm and dry.      Findings: No erythema or rash.   Neurological:      Mental Status: She is alert and oriented to person,  place, and time.      Cranial Nerves: No cranial nerve deficit.      Motor: No abnormal muscle tone.      Coordination: Coordination normal.      Deep Tendon Reflexes: Reflexes are normal and symmetric. Reflexes normal.   Psychiatric:         Mood and Affect: Mood normal.         Behavior: Behavior normal.         Thought Content: Thought content normal.         Judgment: Judgment normal.         Diagnoses and all orders for this visit:    1. Diarrhea, unspecified type (Primary)    2. Weight loss      Assessment:  Diarrhea and now soft BM's   Weight loss that has stabilized.      Recommendations:  She wants to think about whether to have a colonoscopy or not?  F/u prn  Try taking a fiber supplement daily    No follow-ups on file.    Anthony De MD  10/11/2023

## 2023-12-18 RX ORDER — AMLODIPINE BESYLATE AND BENAZEPRIL HYDROCHLORIDE 5; 10 MG/1; MG/1
CAPSULE ORAL
Qty: 90 CAPSULE | Refills: 0 | Status: SHIPPED | OUTPATIENT
Start: 2023-12-18 | End: 2023-12-21

## 2023-12-20 ENCOUNTER — OFFICE VISIT (OUTPATIENT)
Dept: FAMILY MEDICINE CLINIC | Facility: CLINIC | Age: 83
End: 2023-12-20
Payer: MEDICARE

## 2023-12-20 VITALS
SYSTOLIC BLOOD PRESSURE: 132 MMHG | HEIGHT: 58 IN | WEIGHT: 114 LBS | DIASTOLIC BLOOD PRESSURE: 68 MMHG | RESPIRATION RATE: 16 BRPM | HEART RATE: 82 BPM | BODY MASS INDEX: 23.93 KG/M2 | OXYGEN SATURATION: 99 %

## 2023-12-20 DIAGNOSIS — R94.31 ABNORMAL EKG: Primary | ICD-10-CM

## 2023-12-20 PROCEDURE — 3078F DIAST BP <80 MM HG: CPT | Performed by: INTERNAL MEDICINE

## 2023-12-20 PROCEDURE — 3075F SYST BP GE 130 - 139MM HG: CPT | Performed by: INTERNAL MEDICINE

## 2023-12-20 PROCEDURE — 99213 OFFICE O/P EST LOW 20 MIN: CPT | Performed by: INTERNAL MEDICINE

## 2023-12-20 NOTE — PROGRESS NOTES
Subjective chief complaint is abnormal EKG on preoperative testing  Joy C Sprigler is a 83 y.o. female.     History of Present Illness Madiha is here today for abnormal EKG.  She was going to have a procedure on her bladder.  A preoperative EKG at [place] was done.  I can see a report but I cannot see the actual images.  It reads sinus rhythm consider anteroseptal infarct.  I did look at an EKG from 2022 in September.  She had a similar report.  Her V1 and V2 look like they might have been diminished compared to an EKG from 2020.  However I suspect lead placement played a part in that.  She has never had any chest pain or shortness of breath.  She does have hypertension but that has been well-controlled.    The following portions of the patient's history were reviewed and updated as appropriate: allergies, current medications, and problem list.    Review of Systems   Respiratory:  Negative for chest tightness and shortness of breath.    Cardiovascular:  Negative for chest pain.       Objective   Physical Exam  Vitals and nursing note reviewed.   Constitutional:       Appearance: Normal appearance.   Neck:      Vascular: No carotid bruit.   Cardiovascular:      Rate and Rhythm: Normal rate and regular rhythm.      Heart sounds: No murmur heard.     No friction rub. No gallop.   Pulmonary:      Effort: Pulmonary effort is normal.      Breath sounds: No wheezing, rhonchi or rales.   Musculoskeletal:      Right lower leg: No edema.      Left lower leg: No edema.   Neurological:      Mental Status: She is alert.           Assessment & Plan   Diagnoses and all orders for this visit:    1. Abnormal EKG (Primary)    Madiha is here today for an abnormal EKG.  I cannot see the actual images and I am going to try to get them.  It sounds like this EKG is very similar to when she had done last year.  Suspect some of that 1 may have been lead placement.

## 2023-12-21 ENCOUNTER — TELEPHONE (OUTPATIENT)
Dept: FAMILY MEDICINE CLINIC | Facility: CLINIC | Age: 83
End: 2023-12-21
Payer: MEDICARE

## 2023-12-21 RX ORDER — AMLODIPINE BESYLATE AND BENAZEPRIL HYDROCHLORIDE 5; 10 MG/1; MG/1
CAPSULE ORAL
Qty: 90 CAPSULE | Refills: 0 | Status: SHIPPED | OUTPATIENT
Start: 2023-12-21

## 2024-01-09 ENCOUNTER — TELEPHONE (OUTPATIENT)
Dept: FAMILY MEDICINE CLINIC | Facility: CLINIC | Age: 84
End: 2024-01-09

## 2024-01-09 NOTE — TELEPHONE ENCOUNTER
Caller: CHRISTO    Relationship to patient: Other    Best call back number:     Patient is needing: CHRISTO IS CALLING BACK TO SEE IF THE MEDICAL CLEARANCE AND EKG TRACING HAS BEEN REVIEWED/APPROVED.   PLEASE NOTIFY CHRISTO TO ADVISE AS THE PATIENT IS SCHEDULED TO HAVE SURGERY ON 1/16/24.

## 2024-03-20 ENCOUNTER — OFFICE VISIT (OUTPATIENT)
Dept: FAMILY MEDICINE CLINIC | Facility: CLINIC | Age: 84
End: 2024-03-20
Payer: MEDICARE

## 2024-03-20 VITALS
RESPIRATION RATE: 16 BRPM | HEIGHT: 58 IN | BODY MASS INDEX: 24.77 KG/M2 | SYSTOLIC BLOOD PRESSURE: 118 MMHG | OXYGEN SATURATION: 97 % | DIASTOLIC BLOOD PRESSURE: 68 MMHG | HEART RATE: 71 BPM | WEIGHT: 118 LBS

## 2024-03-20 DIAGNOSIS — E78.5 HYPERLIPIDEMIA, UNSPECIFIED HYPERLIPIDEMIA TYPE: ICD-10-CM

## 2024-03-20 DIAGNOSIS — I10 PRIMARY HYPERTENSION: Primary | ICD-10-CM

## 2024-03-20 PROBLEM — N81.2 UTEROVAGINAL PROLAPSE, INCOMPLETE: Status: ACTIVE | Noted: 2024-03-20

## 2024-03-20 PROBLEM — N81.3 COMPLETE UTEROVAGINAL PROLAPSE: Status: ACTIVE | Noted: 2023-10-23

## 2024-03-20 PROBLEM — N81.6 RECTOCELE: Status: ACTIVE | Noted: 2024-03-20

## 2024-03-20 PROCEDURE — 3074F SYST BP LT 130 MM HG: CPT | Performed by: INTERNAL MEDICINE

## 2024-03-20 PROCEDURE — 99213 OFFICE O/P EST LOW 20 MIN: CPT | Performed by: INTERNAL MEDICINE

## 2024-03-20 PROCEDURE — 3078F DIAST BP <80 MM HG: CPT | Performed by: INTERNAL MEDICINE

## 2024-03-20 RX ORDER — AMLODIPINE BESYLATE AND BENAZEPRIL HYDROCHLORIDE 5; 10 MG/1; MG/1
1 CAPSULE ORAL DAILY
Qty: 90 CAPSULE | Refills: 1 | Status: SHIPPED | OUTPATIENT
Start: 2024-03-20

## 2024-03-20 RX ORDER — CALCIUM POLYCARBOPHIL 625 MG
625 TABLET ORAL DAILY
COMMUNITY

## 2024-03-20 NOTE — PROGRESS NOTES
Subjective chief complaint is follow-up on blood pressure  Joy C Sprigler is a 83 y.o. female.     Hypertension  Pertinent negatives include no chest pain or shortness of breath.  Madiha is here today for follow-up on her blood pressure.  Since her last visit she did have her bladder surgery.  She is doing better.  There is no longer anything prolapsing.  She still leaks urine but it is so much better.  She has some hyperlipidemia.  We have not required treatment.  Her LDL is only minimally elevated.    The following portions of the patient's history were reviewed and updated as appropriate: allergies, current medications, and problem list.    Review of Systems   Respiratory:  Negative for chest tightness and shortness of breath.    Cardiovascular:  Negative for chest pain.   Genitourinary:  Positive for urinary incontinence.     Objective   Physical Exam  Vitals and nursing note reviewed.   Constitutional:       Appearance: Normal appearance.   Neck:      Vascular: No carotid bruit.   Cardiovascular:      Rate and Rhythm: Normal rate and regular rhythm.      Pulses: Normal pulses.      Heart sounds: No murmur heard.     No friction rub. No gallop.   Pulmonary:      Effort: Pulmonary effort is normal.      Breath sounds: No wheezing, rhonchi or rales.   Abdominal:      Tenderness: There is no abdominal tenderness. There is no guarding.   Neurological:      Mental Status: She is alert.       Assessment & Plan   Diagnoses and all orders for this visit:    1. Primary hypertension (Primary)    2. Hyperlipidemia, unspecified hyperlipidemia type  -     Lipid Panel    Other orders  -     amLODIPine-benazepril (LOTREL 5-10) 5-10 MG per capsule; Take 1 capsule by mouth Daily.  Dispense: 90 capsule; Refill: 1    Madiha is here today for follow-up.  She seems to be doing quite well for 83 years of age.  Her blood pressure is controlled.  We are going to check her cholesterol today.  She is not fasting but we will make allowances for  that

## 2024-03-21 LAB
CHOLEST SERPL-MCNC: 210 MG/DL (ref 100–199)
HDLC SERPL-MCNC: 53 MG/DL
LDLC SERPL CALC-MCNC: 140 MG/DL (ref 0–99)
TRIGL SERPL-MCNC: 96 MG/DL (ref 0–149)
VLDLC SERPL CALC-MCNC: 17 MG/DL (ref 5–40)

## 2024-09-17 RX ORDER — AMLODIPINE AND BENAZEPRIL HYDROCHLORIDE 5; 10 MG/1; MG/1
1 CAPSULE ORAL DAILY
Qty: 90 CAPSULE | Refills: 1 | Status: SHIPPED | OUTPATIENT
Start: 2024-09-17

## 2024-09-20 ENCOUNTER — OFFICE VISIT (OUTPATIENT)
Dept: FAMILY MEDICINE CLINIC | Facility: CLINIC | Age: 84
End: 2024-09-20
Payer: MEDICARE

## 2024-09-20 VITALS
HEIGHT: 58 IN | RESPIRATION RATE: 16 BRPM | WEIGHT: 117.4 LBS | HEART RATE: 82 BPM | OXYGEN SATURATION: 97 % | SYSTOLIC BLOOD PRESSURE: 132 MMHG | BODY MASS INDEX: 24.64 KG/M2 | DIASTOLIC BLOOD PRESSURE: 84 MMHG

## 2024-09-20 DIAGNOSIS — Z00.00 ENCOUNTER FOR SUBSEQUENT ANNUAL WELLNESS VISIT (AWV) IN MEDICARE PATIENT: Primary | ICD-10-CM

## 2024-09-20 PROCEDURE — 90677 PCV20 VACCINE IM: CPT | Performed by: INTERNAL MEDICINE

## 2024-09-20 PROCEDURE — G0439 PPPS, SUBSEQ VISIT: HCPCS | Performed by: INTERNAL MEDICINE

## 2024-09-20 PROCEDURE — G0009 ADMIN PNEUMOCOCCAL VACCINE: HCPCS | Performed by: INTERNAL MEDICINE

## 2024-09-20 PROCEDURE — 1126F AMNT PAIN NOTED NONE PRSNT: CPT | Performed by: INTERNAL MEDICINE

## 2024-09-20 PROCEDURE — 3078F DIAST BP <80 MM HG: CPT | Performed by: INTERNAL MEDICINE

## 2024-09-20 PROCEDURE — 3074F SYST BP LT 130 MM HG: CPT | Performed by: INTERNAL MEDICINE

## 2024-09-20 RX ORDER — DIAPER,BRIEF,INFANT-TODD,DISP
EACH MISCELLANEOUS
COMMUNITY
Start: 2024-09-12

## 2024-12-20 ENCOUNTER — OFFICE VISIT (OUTPATIENT)
Dept: FAMILY MEDICINE CLINIC | Facility: CLINIC | Age: 84
End: 2024-12-20
Payer: MEDICARE

## 2024-12-20 VITALS
HEIGHT: 58 IN | BODY MASS INDEX: 24.35 KG/M2 | RESPIRATION RATE: 16 BRPM | HEART RATE: 85 BPM | SYSTOLIC BLOOD PRESSURE: 126 MMHG | TEMPERATURE: 98.8 F | OXYGEN SATURATION: 97 % | WEIGHT: 116 LBS | DIASTOLIC BLOOD PRESSURE: 74 MMHG

## 2024-12-20 DIAGNOSIS — U07.1 COVID-19 VIRUS INFECTION: Primary | ICD-10-CM

## 2024-12-20 LAB
EXPIRATION DATE: ABNORMAL
FLUAV AG UPPER RESP QL IA.RAPID: NOT DETECTED
FLUBV AG UPPER RESP QL IA.RAPID: NOT DETECTED
INTERNAL CONTROL: ABNORMAL
Lab: ABNORMAL
SARS-COV-2 AG UPPER RESP QL IA.RAPID: DETECTED

## 2024-12-20 PROCEDURE — 99213 OFFICE O/P EST LOW 20 MIN: CPT | Performed by: INTERNAL MEDICINE

## 2024-12-20 PROCEDURE — 87428 SARSCOV & INF VIR A&B AG IA: CPT | Performed by: INTERNAL MEDICINE

## 2024-12-20 PROCEDURE — 1126F AMNT PAIN NOTED NONE PRSNT: CPT | Performed by: INTERNAL MEDICINE

## 2024-12-20 NOTE — PROGRESS NOTES
Subjective chief complaint is cough and runny nose  Joy C Sprigler is a 84 y.o. female.     Cough  Associated symptoms include rhinorrhea.  [place] is here today for complaints of some head congestion and drainage as well as an intermittent cough.  She is not having fever or chills.  She does not feel any more short of breath than usual.  Her symptoms began approximately 4 days ago.  Her COVID test is positive here today.  She does reports she is coughing up some thick mucoid drainage    The following portions of the patient's history were reviewed and updated as appropriate: allergies, current medications, and problem list.    Review of Systems   HENT:  Positive for congestion and rhinorrhea.    Respiratory:  Positive for cough.      Objective   Physical Exam  Vitals and nursing note reviewed.   HENT:      Right Ear: Tympanic membrane normal.      Left Ear: Tympanic membrane normal.      Nose: Congestion and rhinorrhea present.      Mouth/Throat:      Pharynx: Posterior oropharyngeal erythema present.   Cardiovascular:      Rate and Rhythm: Normal rate and regular rhythm.   Pulmonary:      Effort: Pulmonary effort is normal.      Breath sounds: No wheezing, rhonchi or rales.     Assessment & Plan   Diagnoses and all orders for this visit:    1. COVID-19 virus infection (Primary)    Madiha is here today for respiratory symptoms.  Her COVID test is positive.  We did discuss treatment options.  We did advise rest, fluids, Tylenol for aches and fever, and Mucinex DM for cough and congestion.  We did discuss antiviral therapy.  She is on the cusp of where that may or may not be effective and she really does not appear terribly ill.  For now she is going to opt for no antiviral treatment.

## 2025-03-20 RX ORDER — AMLODIPINE AND BENAZEPRIL HYDROCHLORIDE 5; 10 MG/1; MG/1
1 CAPSULE ORAL DAILY
Qty: 90 CAPSULE | Refills: 1 | Status: SHIPPED | OUTPATIENT
Start: 2025-03-20

## 2025-03-21 ENCOUNTER — OFFICE VISIT (OUTPATIENT)
Dept: FAMILY MEDICINE CLINIC | Facility: CLINIC | Age: 85
End: 2025-03-21
Payer: MEDICARE

## 2025-03-21 VITALS
WEIGHT: 119.2 LBS | BODY MASS INDEX: 25.02 KG/M2 | RESPIRATION RATE: 16 BRPM | DIASTOLIC BLOOD PRESSURE: 76 MMHG | HEIGHT: 58 IN | HEART RATE: 78 BPM | OXYGEN SATURATION: 98 % | SYSTOLIC BLOOD PRESSURE: 130 MMHG

## 2025-03-21 DIAGNOSIS — I10 PRIMARY HYPERTENSION: Primary | ICD-10-CM

## 2025-03-21 DIAGNOSIS — E78.5 HYPERLIPIDEMIA, UNSPECIFIED HYPERLIPIDEMIA TYPE: ICD-10-CM

## 2025-03-21 DIAGNOSIS — R07.89 CHEST WALL PAIN: ICD-10-CM

## 2025-03-21 LAB
ALBUMIN SERPL-MCNC: 4.4 G/DL (ref 3.5–5.2)
ALBUMIN/GLOB SERPL: 1.7 G/DL
ALP SERPL-CCNC: 67 U/L (ref 39–117)
ALT SERPL-CCNC: 12 U/L (ref 1–33)
AST SERPL-CCNC: 17 U/L (ref 1–32)
BASOPHILS # BLD AUTO: 0.04 10*3/MM3 (ref 0–0.2)
BASOPHILS NFR BLD AUTO: 0.5 % (ref 0–1.5)
BILIRUB SERPL-MCNC: 0.6 MG/DL (ref 0–1.2)
BUN SERPL-MCNC: 14 MG/DL (ref 8–23)
BUN/CREAT SERPL: 18.9 (ref 7–25)
CALCIUM SERPL-MCNC: 9.5 MG/DL (ref 8.6–10.5)
CHLORIDE SERPL-SCNC: 101 MMOL/L (ref 98–107)
CHOLEST SERPL-MCNC: 191 MG/DL (ref 0–200)
CO2 SERPL-SCNC: 27.4 MMOL/L (ref 22–29)
CREAT SERPL-MCNC: 0.74 MG/DL (ref 0.57–1)
EGFRCR SERPLBLD CKD-EPI 2021: 78.9 ML/MIN/1.73
EOSINOPHIL # BLD AUTO: 0.2 10*3/MM3 (ref 0–0.4)
EOSINOPHIL NFR BLD AUTO: 2.4 % (ref 0.3–6.2)
ERYTHROCYTE [DISTWIDTH] IN BLOOD BY AUTOMATED COUNT: 13.4 % (ref 12.3–15.4)
GLOBULIN SER CALC-MCNC: 2.6 GM/DL
GLUCOSE SERPL-MCNC: 87 MG/DL (ref 65–99)
HCT VFR BLD AUTO: 42.4 % (ref 34–46.6)
HDLC SERPL-MCNC: 52 MG/DL (ref 40–60)
HGB BLD-MCNC: 14.4 G/DL (ref 12–15.9)
IMM GRANULOCYTES # BLD AUTO: 0.02 10*3/MM3 (ref 0–0.05)
IMM GRANULOCYTES NFR BLD AUTO: 0.2 % (ref 0–0.5)
LDLC SERPL CALC-MCNC: 121 MG/DL (ref 0–100)
LYMPHOCYTES # BLD AUTO: 2.41 10*3/MM3 (ref 0.7–3.1)
LYMPHOCYTES NFR BLD AUTO: 28.8 % (ref 19.6–45.3)
MCH RBC QN AUTO: 29.2 PG (ref 26.6–33)
MCHC RBC AUTO-ENTMCNC: 34 G/DL (ref 31.5–35.7)
MCV RBC AUTO: 86 FL (ref 79–97)
MONOCYTES # BLD AUTO: 0.66 10*3/MM3 (ref 0.1–0.9)
MONOCYTES NFR BLD AUTO: 7.9 % (ref 5–12)
NEUTROPHILS # BLD AUTO: 5.05 10*3/MM3 (ref 1.7–7)
NEUTROPHILS NFR BLD AUTO: 60.2 % (ref 42.7–76)
NRBC BLD AUTO-RTO: 0 /100 WBC (ref 0–0.2)
PLATELET # BLD AUTO: 317 10*3/MM3 (ref 140–450)
POTASSIUM SERPL-SCNC: 4.1 MMOL/L (ref 3.5–5.2)
PROT SERPL-MCNC: 7 G/DL (ref 6–8.5)
RBC # BLD AUTO: 4.93 10*6/MM3 (ref 3.77–5.28)
SODIUM SERPL-SCNC: 140 MMOL/L (ref 136–145)
T4 FREE SERPL-MCNC: 1.33 NG/DL (ref 0.92–1.68)
TRIGL SERPL-MCNC: 102 MG/DL (ref 0–150)
TSH SERPL DL<=0.005 MIU/L-ACNC: 1.59 UIU/ML (ref 0.27–4.2)
VLDLC SERPL CALC-MCNC: 18 MG/DL (ref 5–40)
WBC # BLD AUTO: 8.38 10*3/MM3 (ref 3.4–10.8)

## 2025-03-21 PROCEDURE — 1126F AMNT PAIN NOTED NONE PRSNT: CPT | Performed by: INTERNAL MEDICINE

## 2025-03-21 PROCEDURE — 99213 OFFICE O/P EST LOW 20 MIN: CPT | Performed by: INTERNAL MEDICINE

## 2025-03-21 RX ORDER — CLINDAMYCIN PHOSPHATE 10 MG/G
1 AEROSOL, FOAM TOPICAL 2 TIMES DAILY
Qty: 50 G | Refills: 1 | Status: SHIPPED | OUTPATIENT
Start: 2025-03-21

## 2025-03-21 NOTE — PROGRESS NOTES
Subjective Chief complaint is follow up on blood pressure  Joy C Sprigler is a 84 y.o. female.     Hypertension  Pertinent negatives include no chest pain, headaches or shortness of breath.  Madiha is here today for follow up on her blood pressure. She has been feeling well with no headache, dizziness , chest pain, or shortness of breath. She occasionally has some left lowe rig discomfort from bending forward  It has been a year since we checked on her cholesterol. She currently does not require medication for that.  The following portions of the patient's history were reviewed and updated as appropriate: She  has a past medical history of Abrasion of skin, Arthritis, Female bladder prolapse, Hydroureteronephrosis (11/28/2020), Hyperlipemia, Hypertension, Kidney stone, Lower back pain (11/15/2022), Right ureteral stone (11/27/2020), and Shingles (9/11/2017).  She does not have any pertinent problems on file.  Current Outpatient Medications   Medication Sig Dispense Refill    amLODIPine-benazepril (LOTREL 5-10) 5-10 MG per capsule TAKE 1 CAPSULE BY MOUTH DAILY 90 capsule 1    CALCIUM PO Take  by mouth.      ESTRACE VAGINAL 0.1 MG/GM vaginal cream Insert 2 g into the vagina 1 (One) Time Per Week.  3    hydrocortisone 1 % ointment APPLY SPARINGLY TO AFFECTED AREA TWICE DAILY      ibuprofen (ADVIL,MOTRIN) 200 MG tablet Take 2 tablets by mouth Every 6 (Six) Hours As Needed for Mild Pain.      Multiple Vitamin (MULTI VITAMIN DAILY PO) Take 1 tablet by mouth Daily. HOLD FOR SURGERY      polycarbophil 625 MG tablet tablet Take 1 tablet by mouth Daily.      Clindamycin Phosphate 1 % foam Apply 1 Act topically to the appropriate area as directed 2 (Two) Times a Day. 50 g 1     No current facility-administered medications for this visit.     She has no known allergies..    Review of Systems   Respiratory:  Negative for chest tightness and shortness of breath.    Cardiovascular:  Negative for chest pain and leg swelling.    Neurological:  Negative for dizziness, light-headedness and headaches.     Objective   Physical Exam  Vitals and nursing note reviewed.   Constitutional:       Appearance: Normal appearance.   Neck:      Vascular: No carotid bruit.   Cardiovascular:      Rate and Rhythm: Normal rate and regular rhythm.      Heart sounds: No murmur heard.     No friction rub. No gallop.   Pulmonary:      Breath sounds: No wheezing, rhonchi or rales.      Comments: She has tenderness to the left costochondral cartilage.  Chest:      Chest wall: Tenderness present.   Musculoskeletal:      Right lower leg: No edema.      Left lower leg: No edema.   Neurological:      Mental Status: She is alert.     Assessment & Plan   Diagnoses and all orders for this visit:    1. Primary hypertension (Primary)  -     CBC & Differential    2. Hyperlipidemia, unspecified hyperlipidemia type  -     Comprehensive Metabolic Panel  -     Lipid Panel  -     TSH+Free T4    3. Chest wall pain    Other orders  -     Clindamycin Phosphate 1 % foam; Apply 1 Act topically to the appropriate area as directed 2 (Two) Times a Day.  Dispense: 50 g; Refill: 1    Madiha is here today for follow up on her blood pressure . She seems to be doing well and no changes were made

## 2025-04-04 ENCOUNTER — TELEPHONE (OUTPATIENT)
Dept: FAMILY MEDICINE CLINIC | Facility: CLINIC | Age: 85
End: 2025-04-04
Payer: MEDICARE

## 2025-04-04 NOTE — TELEPHONE ENCOUNTER
Name: RavenjdMadiha      Relationship: Self      Best Callback Number: [phone]       [employer] PROVIDED THE RELAY MESSAGE FROM THE OFFICE      PATIENT: VOICED UNDERSTANDING AND HAS NO FURTHER QUESTIONS AT THIS TIME    ADDITIONAL INFORMATION:  PATIENT STATES SHE WILL NOT BE USING THIS ANYMORE

## 2025-04-04 NOTE — TELEPHONE ENCOUNTER
Rely;  Called and left patient a voicemail advising that her Clindamycin phosphate foam was not approved by insurance and if she wants it she would need to pay out of pocket.

## 2025-06-20 RX ORDER — AMLODIPINE AND BENAZEPRIL HYDROCHLORIDE 5; 10 MG/1; MG/1
1 CAPSULE ORAL DAILY
Qty: 90 CAPSULE | Refills: 1 | Status: SHIPPED | OUTPATIENT
Start: 2025-06-20

## (undated) DEVICE — TRAP FLD MINIVAC MEGADYNE 100ML

## (undated) DEVICE — GLV SURG SENSICARE W/ALOE PF LF 7 STRL

## (undated) DEVICE — SPNG GZ WOVN 4X4IN 12PLY 10/BX STRL

## (undated) DEVICE — IRRIGATOR TOOMEY 70CC

## (undated) DEVICE — GLV SURG BIOGEL LTX PF 8

## (undated) DEVICE — CATH IV INSYTE AUTOGARD 14G 1 1/2IN ORNG

## (undated) DEVICE — APPL CHLORAPREP HI/LITE 26ML ORNG

## (undated) DEVICE — TBG PENCL TELESCP MEGADYNE SMOKE EVAC 10FT

## (undated) DEVICE — SPONGE,NEURO,.75"X.75",XR,STRL,LF,10/PK: Brand: MEDLINE

## (undated) DEVICE — CONN TBG Y 5 IN 1 LF STRL

## (undated) DEVICE — CATH URETRL FLXITP POLLACK STD 5F 70CM

## (undated) DEVICE — SMOKE EVACUATION TUBING WITH 7/8 IN TO 1/4 IN REDUCER: Brand: BUFFALO FILTER

## (undated) DEVICE — SYR CONTRL PRESS/LO FIX/M/LL W/THMB/RNG 10ML

## (undated) DEVICE — SPONGE,LAP,12"X12",XR,ST,5/PK,40PK/CS: Brand: MEDLINE

## (undated) DEVICE — SUT MNCRYL PLS ANTIB UD 4/0 PS2 18IN

## (undated) DEVICE — OPTIFOAM GENTLE SA, POSTOP, 4X8: Brand: MEDLINE

## (undated) DEVICE — LOU CYSTO: Brand: MEDLINE INDUSTRIES, INC.

## (undated) DEVICE — SOL NACL 0.9PCT 100ML SGL

## (undated) DEVICE — SYR LUERLOK 20CC BX/50

## (undated) DEVICE — 3M™ STERI-STRIP™ ANTIMICROBIAL SKIN CLOSURES 1/2 INCH X 4 INCHES 50/CARTON 4 CARTONS/CASE A1847: Brand: 3M™ STERI-STRIP™

## (undated) DEVICE — DRP MICROSCOPE 4 BINOCULAR CV 54X150IN

## (undated) DEVICE — PATIENT RETURN ELECTRODE, SINGLE-USE, CONTACT QUALITY MONITORING, ADULT, WITH 9FT CORD, FOR PATIENTS WEIGING OVER 33LBS. (15KG): Brand: MEGADYNE

## (undated) DEVICE — GLV SURG BIOGEL LTX PF 7

## (undated) DEVICE — INTENDED FOR TISSUE SEPARATION, AND OTHER PROCEDURES THAT REQUIRE A SHARP SURGICAL BLADE TO PUNCTURE OR CUT.: Brand: BARD-PARKER ® STAINLESS STEEL BLADES

## (undated) DEVICE — ANTIBACTERIAL UNDYED BRAIDED (POLYGLACTIN 910), SYNTHETIC ABSORBABLE SUTURE: Brand: COATED VICRYL

## (undated) DEVICE — Device

## (undated) DEVICE — TIDISHIELD UROLOGY DRAIN BAGS FROSTY VINYL STERILE FITS SIEMENS UROSKOP ACCESS 20 PER CASE: Brand: TIDISHIELD

## (undated) DEVICE — UNDYED BRAIDED (POLYGLACTIN 910), SYNTHETIC ABSORBABLE SUTURE: Brand: COATED VICRYL

## (undated) DEVICE — TOOL MR8-15BA50T MR8 15CM BAL SYMTRI 5MM: Brand: MIDAS REX MR8

## (undated) DEVICE — DRSNG GZ PETROLTM XEROFORM CURAD 1X8IN STRL

## (undated) DEVICE — PK NEURO SPINE 40

## (undated) DEVICE — NITINOL WIRE WITH HYDROPHILIC TIP: Brand: SENSOR

## (undated) DEVICE — PENCL ES MEGADINE EZ/CLEAN BUTN W/HOLSTR 10FT

## (undated) DEVICE — NEEDLE, QUINCKE, 18GX3.5": Brand: MEDLINE

## (undated) DEVICE — DISPOSABLE BIPOLAR CABLE 12FT. (3.6M): Brand: KIRWAN

## (undated) DEVICE — NEEDLE, QUINCKE, 20GX3.5": Brand: MEDLINE

## (undated) DEVICE — SYR LL 3CC

## (undated) DEVICE — ADHS LIQ MASTISOL 2/3ML

## (undated) DEVICE — GLV SURG PREMIERPRO ORTHO LTX PF SZ8 BRN

## (undated) DEVICE — GLV SURG SENSICARE W/ALOE PF LF 7.5 STRL

## (undated) DEVICE — 1010 S-DRAPE TOWEL DRAPE 10/BX: Brand: STERI-DRAPE™

## (undated) DEVICE — MEDI-VAC YANKAUER SUCTION HANDLE W/BULBOUS TIP: Brand: CARDINAL HEALTH